# Patient Record
Sex: MALE | Race: OTHER | Employment: FULL TIME | URBAN - METROPOLITAN AREA
[De-identification: names, ages, dates, MRNs, and addresses within clinical notes are randomized per-mention and may not be internally consistent; named-entity substitution may affect disease eponyms.]

---

## 2017-11-14 ENCOUNTER — DOCUMENTATION ONLY (OUTPATIENT)
Dept: FAMILY MEDICINE CLINIC | Age: 37
End: 2017-11-14

## 2017-11-14 ENCOUNTER — OFFICE VISIT (OUTPATIENT)
Dept: FAMILY MEDICINE CLINIC | Age: 37
End: 2017-11-14

## 2017-11-14 VITALS
OXYGEN SATURATION: 95 % | BODY MASS INDEX: 40.18 KG/M2 | TEMPERATURE: 98.9 F | HEART RATE: 107 BPM | DIASTOLIC BLOOD PRESSURE: 88 MMHG | HEIGHT: 66 IN | RESPIRATION RATE: 19 BRPM | SYSTOLIC BLOOD PRESSURE: 155 MMHG | WEIGHT: 250 LBS

## 2017-11-14 DIAGNOSIS — F32.9 REACTIVE DEPRESSION: ICD-10-CM

## 2017-11-14 DIAGNOSIS — R03.0 ELEVATED BP WITHOUT DIAGNOSIS OF HYPERTENSION: Primary | ICD-10-CM

## 2017-11-14 RX ORDER — MELOXICAM 15 MG/1
TABLET ORAL
Refills: 0 | COMMUNITY
Start: 2017-11-03 | End: 2018-08-13

## 2017-11-14 RX ORDER — BUPROPION HYDROCHLORIDE 150 MG/1
150 TABLET ORAL
Qty: 30 TAB | Refills: 0 | Status: SHIPPED | OUTPATIENT
Start: 2017-11-14 | End: 2017-12-06 | Stop reason: SDUPTHER

## 2017-11-14 NOTE — PROGRESS NOTES
Chief Complaint   Patient presents with    New Patient     Establish Care     Patient seen in the office today to establish care

## 2017-11-14 NOTE — PROGRESS NOTES
This note will not be viewable in [address] Office Visit:  14 November 2017     Visit Duration:  45 Minutes                Type: Individual Therapy, Scheduled:          Reason for referral/Chief complaint:  Marital issues, sent by Wife. Referred by: PCP not present at Practice. PCPs concern: N/A    Screening:  Other: Specify : None    Functional Assessment (checkboxes, providing details as indicated):     Mood: anxious Sleep: Trouble falling asleep and Middle insomnia. Physical Activity: No Recreation : Use to workout regularly, but unable now. Caffeine: Yes, 1-2 coffee & 44oz Soda Non-prescription drugs: Yes, Advil PM ETOH:. Yes, 1-3 beers nightly. Tobacco: No, Free since 2014   Close relationships: spouse/SO and family member patient, friend and son Health/Medical concern: Yes, Hx: AD/HD and Depression  Work: full time job doing [employer]     Third Level  Intervention:   Goal setting (S.M.A.R.T.)    Patient education:  verbal Specific handout: Return    Diagnostic Impressions:   Summary statement from Functional Assessment: Cecy Duarte reported Hx of Wellbutrin Rx for Depression 12 years ago with improvement to his Depressive Sx and Family Hx of Depression and Mother's suicide attempts, Yogi Wooten is committed long-term, and grandmother was declared legally insane and incompetent by the courts. Cecy Duarte reported current lack of motivation and initiation to \"get out of bed. \" Cecy Duarte reported discontent with place of work and lack of communication within the departments creating additional stressors. Cecy Duarte reported financial stressors creating difficulty to see benefit in current workplace and feeling as if \"stuck\" with no room for growth. Cecy Duarte shared he truly desires to work in [employer] of [employer] in repairs or building [employer] instruments. Cecy Duarte reported tendonitis in his wrist preventing him from playing music, his use to be coping skill.  Cecy Duarte reported Mother and Father have issues with alcohol as well as Hx Diabetes (Mother suffers from GOUT related to Diabetes).      Lethality  None Reported    Risk level Impressions:   None Reported  Follow-up:   Follow-up 2 Weeks      Adriana Gaines

## 2017-11-14 NOTE — MR AVS SNAPSHOT
Visit Information Date & Time Provider Department Dept. Phone Encounter #  
 11/14/2017  2:00 PM Cm Carlos MD [address] [facility] [phone] [id number] Upcoming Health Maintenance Date Due DTaP/Tdap/Td series (1 - Tdap) 10/30/2002 Influenza Age 5 to Adult 8/1/2017 Allergies as of 11/14/2017  Review Complete On: 11/14/2017 By: Yves Aquino MD  
 No Known Allergies Current Immunizations  Never Reviewed No immunizations on file. Not reviewed this visit You Were Diagnosed With   
  
 Codes Comments Elevated BP without diagnosis of hypertension    -  Primary ICD-10-CM: R03.0 ICD-9-CM: 796.2 Reactive depression     ICD-10-CM: F32.9 ICD-9-CM: 300.4 Vitals BP Pulse Temp Resp Height(growth percentile) Weight(growth percentile) 155/88 (BP 1 Location: Left arm, BP Patient Position: Sitting) (!) 107 98.9 °F (37.2 °C) (Oral) 19 5' 6\" (1.676 m) 250 lb (113.4 kg) SpO2 BMI Smoking Status 95% 40.35 kg/m2 Former Smoker Vitals History BMI and BSA Data Body Mass Index Body Surface Area  
 40.35 kg/m 2 2.3 m 2 Preferred Pharmacy Pharmacy Name Phone [facility] [address] [place] [address], [address] [place] [address] [phone] Your Updated Medication List  
  
   
This list is accurate as of: 11/14/17  3:23 PM.  Always use your most recent med list. ADVIL PM PO Take  by mouth. Indications: alterantes if necessary buPROPion  mg tablet Commonly known as:  Akin Jb Take 1 Tab by mouth every morning. meloxicam 15 mg tablet Commonly known as:  MOBIC TK 1 T PO QD Prescriptions Sent to Pharmacy Refills buPROPion XL (WELLBUTRIN XL) 150 mg tablet 0 Sig: Take 1 Tab by mouth every morning.   
 Class: Normal  
 Pharmacy: [address] [facility] [address] AT [address] [place] [address] Raheem Liao 23  #: [phone] Route: Oral  
  
We Performed the Following CBC WITH AUTOMATED DIFF [27994 CPT(R)] HEMOGLOBIN A1C WITH EAG [11032 CPT(R)] LIPID PANEL [03709 CPT(R)] METABOLIC PANEL, COMPREHENSIVE [32129 CPT(R)] TSH 3RD GENERATION [87942 CPT(R)] Introducing [facility]! [facility] introduces [employer] patient portal. Now you can access parts of your medical record, email your doctor's office, and request medication refills online. 1. In your internet browser, go to [email or web address] 2. Click on the First Time User? Click Here link in the Sign In box. You will see the New Member Sign Up page. 3. Enter your [employer] Access Code exactly as it appears below. You will not need to use this code after youve completed the sign-up process. If you do not sign up before the expiration date, you must request a new code. · [employer] Access Code: [email or web address] Expires: 2/12/2018  3:18 PM 
 
4. Enter the last four digits of your Social Security Number (xxxx) and Date of Birth (mm/dd/yyyy) as indicated and click Submit. You will be taken to the next sign-up page. 5. Create a [employer] ID. This will be your [employer] login ID and cannot be changed, so think of one that is secure and easy to remember. 6. Create a [employer] password. You can change your password at any time. 7. Enter your Password Reset Question and Answer. This can be used at a later time if you forget your password. 8. Enter your e-mail address. You will receive e-mail notification when new information is available in [address] 9. Click Sign Up. You can now view and download portions of your medical record. 10. Click the Download Summary menu link to download a portable copy of your medical information. If you have questions, please visit the Frequently Asked Questions section of the [employer] website. Remember, [employer] is NOT to be used for urgent needs.  For medical emergencies, dial 911. Now available from your iPhone and Android! Please provide this summary of care documentation to your next provider. Your primary care clinician is listed as Monse Carlos. If you have any questions after today's visit, please call [phone].

## 2017-11-14 NOTE — PROGRESS NOTES
Chief Complaint   Patient presents with   [facility] Patient     Establish Care     Patient seen in the office today to establish care    Subjective: (As above and below)     Chief Complaint   Patient presents with    New Patient     Establish Care     he is a 40y.o. year old male who presents for evaluation. Reviewed PmHx, RxHx, FmHx, SocHx, AllgHx and updated in chart. Review of Systems - negative except as listed above    Objective:     Vitals:    11/14/17 1451   BP: 155/88   Pulse: (!) 107   Resp: 19   Temp: 98.9 °F (37.2 °C)   TempSrc: Oral   SpO2: 95%   Weight: 250 lb (113.4 kg)   Height: 5' 6\" (1.676 m)     Physical Examination: General appearance - alert, well appearing, and in no distress  Mental status - normal mood, behavior, speech, dress, motor activity, and thought processes  Eyes - pupils equal and reactive, extraocular eye movements intact  Mouth - mucous membranes moist, pharynx normal without lesions  Chest - clear to auscultation, no wheezes, rales or rhonchi, symmetric air entry  Heart - normal rate, regular rhythm, normal S1, S2, no murmurs, rubs, clicks or gallops  Musculoskeletal - no joint tenderness, deformity or swelling  Extremities - peripheral pulses normal, no pedal edema, no clubbing or cyanosis    Assessment/ Plan:   1. Elevated BP without diagnosis of hypertension  -pt will follow up for fasting labs  - CBC WITH AUTOMATED DIFF  - LIPID PANEL  - METABOLIC PANEL, COMPREHENSIVE  - TSH 3RD GENERATION  - HEMOGLOBIN A1C WITH EAG    2. Reactive depression  -resume wellbutrin     Follow-up Disposition: As needed or as scheduled for counseling with Ganesh Leroy    I have discussed the diagnosis with the patient and the intended plan as seen in the above orders. The patient has received an after-visit summary and questions were answered concerning future plans.      Medication Side Effects and Warnings were discussed with patient: yes  Patient Labs were reviewed: yes  Patient Past Records were reviewed:  yes    Donovan Alfaro M.D.

## 2017-12-06 ENCOUNTER — DOCUMENTATION ONLY (OUTPATIENT)
Dept: FAMILY MEDICINE CLINIC | Age: 37
End: 2017-12-06

## 2017-12-06 ENCOUNTER — OFFICE VISIT (OUTPATIENT)
Dept: FAMILY MEDICINE CLINIC | Age: 37
End: 2017-12-06

## 2017-12-06 VITALS
WEIGHT: 246.2 LBS | BODY MASS INDEX: 39.57 KG/M2 | RESPIRATION RATE: 20 BRPM | OXYGEN SATURATION: 95 % | SYSTOLIC BLOOD PRESSURE: 124 MMHG | DIASTOLIC BLOOD PRESSURE: 83 MMHG | HEART RATE: 85 BPM | HEIGHT: 66 IN | TEMPERATURE: 98.8 F

## 2017-12-06 DIAGNOSIS — R03.0 ELEVATED BP WITHOUT DIAGNOSIS OF HYPERTENSION: ICD-10-CM

## 2017-12-06 DIAGNOSIS — F32.A DEPRESSION, UNSPECIFIED DEPRESSION TYPE: Primary | ICD-10-CM

## 2017-12-06 DIAGNOSIS — J40 BRONCHITIS: ICD-10-CM

## 2017-12-06 RX ORDER — METHYLPREDNISOLONE 4 MG/1
TABLET ORAL
COMMUNITY
Start: 2017-11-30 | End: 2017-12-21 | Stop reason: ALTCHOICE

## 2017-12-06 RX ORDER — CEFDINIR 300 MG/1
CAPSULE ORAL
COMMUNITY
Start: 2017-11-30 | End: 2017-12-21 | Stop reason: ALTCHOICE

## 2017-12-06 RX ORDER — BUPROPION HYDROCHLORIDE 150 MG/1
150 TABLET ORAL
Qty: 30 TAB | Refills: 5 | Status: SHIPPED | OUTPATIENT
Start: 2017-12-06 | End: 2018-02-05 | Stop reason: SDUPTHER

## 2017-12-06 RX ORDER — CODEINE PHOSPHATE AND GUAIFENESIN 10; 100 MG/5ML; MG/5ML
5 SOLUTION ORAL
Qty: 240 ML | Refills: 0 | Status: SHIPPED | OUTPATIENT
Start: 2017-12-06 | End: 2017-12-18 | Stop reason: SDUPTHER

## 2017-12-06 NOTE — PROGRESS NOTES
Chief Complaint   Patient presents with    Labs    Cough     unresolved    Cold Symptoms     unresolved    Follow-up     [facility] on 11/23/17       Patient seen in the office today for fasting labs. Patient states \"I had 1 Yoruba weinberg\"  Patient also reports being seen at [facility] on 11/23/17.  Patient states his symptoms are unresolved, he continues to cough with  increased chest congestion, despite completing Z-pack, and 3 days left of Omnicef and Prednisone  Patient reports he is out of cough syrup, he is requesting another rx

## 2017-12-06 NOTE — PROGRESS NOTES
This note will not be viewable in [address] Office Visit:  6 December 2017     Visit Duration:  30 Minutes                Type: Individual Therapy, Scheduled:          Reason for referral/Chief complaint:  Follow-up      Functional Assessment (checkboxes, providing details as indicated):     Mood: anxious Sleep: No reported symptoms Physical Activity: No Change Recreation : No change   Caffeine: No Change Non-prescription drugs: No Change ETOH:. No Change Tobacco: No Change   Close relationships:  at [employer] was involved in [employer]. Health/Medical concern: No Change N/A Work: work stresses Feel harassed from [address]. Third Level  Intervention:   Cognitive intervention    Patient education:  verbal Specific handout: Stop, Breathe, Think    Diagnostic Impressions:   Summary statement from Functional Assessment: Josi Mckinney reported stressors and frustrations exacerbating existing anxious mood presentations. Josi Mckinney processed these stressors and agreed to utilize wyatt at night with meditations to de-stress and promote reduced anxious mood. Josi Mckinney agreed to plan his future and continue on a slower path to discourage snap decisions resulting in negative outcomes. Lethality  None Reported    Risk level Impressions:   None Reported  Follow-up:    On week      Shar Law

## 2017-12-06 NOTE — MR AVS SNAPSHOT
Visit Information Date & Time Provider Department Dept. Phone Encounter #  
 12/6/2017  3:30 PM Jimmy Lorenzana MD [facility] [facility] [phone] [id number] Your Appointments 12/11/2017  4:30 PM  
CONSULT with Lashelllanny Fam [address] [facility] ([address]) Appt Note: f/u per [facility] DB  
 [address] [address]  
[phone]  
  
   
 [address] [address]  
  
    
 12/18/2017  4:30 PM  
CONSULT with Lashlel Fam [address] [place] [facility] ([address]) Appt Note: f/u, per [facility]  
 [address] [address] [phone] Upcoming Health Maintenance Date Due DTaP/Tdap/Td series (1 - Tdap) 10/30/2002 Influenza Age 5 to Adult 8/1/2017 Allergies as of 12/6/2017  Review Complete On: 12/6/2017 By: Jimmy Lorenzana MD  
 No Known Allergies Current Immunizations  Never Reviewed No immunizations on file. Not reviewed this visit You Were Diagnosed With   
  
 Codes Comments Depression, unspecified depression type    -  Primary ICD-10-CM: F32.9 ICD-9-CM: 249 Bronchitis     ICD-10-CM: J40 ICD-9-CM: 398 Elevated BP without diagnosis of hypertension     ICD-10-CM: R03.0 ICD-9-CM: 796.2 Vitals BP Pulse Temp Resp Height(growth percentile) Weight(growth percentile) 124/83 (BP 1 Location: Left arm, BP Patient Position: Sitting) 85 98.8 °F (37.1 °C) (Oral) 20 5' 6\" (1.676 m) 246 lb 3.2 oz (111.7 kg) SpO2 BMI Smoking Status 95% 39.74 kg/m2 Former Smoker Vitals History BMI and BSA Data Body Mass Index Body Surface Area 39.74 kg/m 2 2.28 m 2 Preferred Pharmacy Pharmacy Name Phone [facility] [address] [place] [address], [address] [place] [address] Drive [phone] Your Updated Medication List  
  
   
This list is accurate as of: 12/6/17  3:42 PM.  Always use your most recent med list. ADVIL PM PO Take  by mouth. Indications: alterantes if necessary buPROPion  mg tablet Commonly known as:  Lashaun Plume Take 1 Tab by mouth every morning. cefdinir 300 mg capsule Commonly known as:  OMNICEF  
  
 guaiFENesin-codeine 100-10 mg/5 mL solution Commonly known as:  ROBITUSSIN AC Take 5 mL by mouth three (3) times daily as needed for Cough. Max Daily Amount: 15 mL. meloxicam 15 mg tablet Commonly known as:  MOBIC TK 1 T PO QD  
  
 methylPREDNISolone 4 mg tablet Commonly known as:  Nguyen Beagle Prescriptions Printed Refills  
 guaiFENesin-codeine (ROBITUSSIN AC) 100-10 mg/5 mL solution 0 Sig: Take 5 mL by mouth three (3) times daily as needed for Cough. Max Daily Amount: 15 mL. Class: Print Route: Oral  
  
Prescriptions Sent to Pharmacy Refills buPROPion XL (WELLBUTRIN XL) 150 mg tablet 5 Sig: Take 1 Tab by mouth every morning. Class: Normal  
 Pharmacy: [facility] [address] [place] [address] [address] [place] [address] #: [phone] Route: Oral  
  
We Performed the Following CBC WITH AUTOMATED DIFF [16064 CPT(R)] HEMOGLOBIN A1C WITH EAG [03793 CPT(R)] LIPID PANEL [92770 CPT(R)] METABOLIC PANEL, COMPREHENSIVE [98124 CPT(R)] TSH 3RD GENERATION [25169 CPT(R)] Introducing hospitals & HEALTH SERVICES! Deena Montelongo introduces [employer] patient portal. Now you can access parts of your medical record, email your doctor's office, and request medication refills online. 1. In your internet browser, go to [email or web address] 2. Click on the First Time User? Click Here link in the Sign In box. You will see the New Member Sign Up page. 3. Enter your [employer] Access Code exactly as it appears below. You will not need to use this code after youve completed the sign-up process.  If you do not sign up before the expiration date, you must request a new code. · [employer] Access Code: [email or web address] Expires: 2/12/2018  3:18 PM 
 
4. Enter the last four digits of your Social Security Number (xxxx) and Date of Birth (mm/dd/yyyy) as indicated and click Submit. You will be taken to the next sign-up page. 5. Create a [employer] ID. This will be your [employer] login ID and cannot be changed, so think of one that is secure and easy to remember. 6. Create a [employer] password. You can change your password at any time. 7. Enter your Password Reset Question and Answer. This can be used at a later time if you forget your password. 8. Enter your e-mail address. You will receive e-mail notification when new information is available in [address] 9. Click Sign Up. You can now view and download portions of your medical record. 10. Click the Download Summary menu link to download a portable copy of your medical information. If you have questions, please visit the Frequently Asked Questions section of the [employer] website. Remember, [employer] is NOT to be used for urgent needs. For medical emergencies, dial 911. Now available from your iPhone and Android! Please provide this summary of care documentation to your next provider. Your primary care clinician is listed as Monse Carlos. If you have any questions after today's visit, please call [phone].

## 2017-12-06 NOTE — PROGRESS NOTES
Patient seen in the office today for fasting labs. Patient states \"I had 1 french weinberg\"  Patient also reports unresolved coughing and increased chest congestion, despite completing 1 Zpak and all but 3 days left of Omnicef  Patient reports he is out of cough syrup, he is requesting another rx due to rib pain with coughing. Subjective: (As above and below)     Chief Complaint   Patient presents with    Labs    Cough     unresolved    Cold Symptoms     unresolved    Follow-up     [facility] on 11/23/17     he is a 40y.o. year old male who presents for evaluation. Reviewed PmHx, RxHx, FmHx, SocHx, AllgHx and updated in chart. Review of Systems - negative except as listed above    Objective:     Vitals:    12/06/17 1522   BP: 124/83   Pulse: 85   Resp: 20   Temp: 98.8 °F (37.1 °C)   TempSrc: Oral   SpO2: 95%   Weight: 246 lb 3.2 oz (111.7 kg)   Height: 5' 6\" (1.676 m)     Physical Examination: General appearance - alert, well appearing, and in no distress  Mental status - normal mood, behavior, speech, dress, motor activity, and thought processes  Mouth - mucous membranes moist, pharynx normal without lesions  Chest - clear to auscultation, no wheezes, rales or rhonchi, symmetric air entry  Heart - normal rate, regular rhythm, normal S1, S2, no murmurs, rubs, clicks or gallops  Musculoskeletal - no joint tenderness, deformity or swelling    Assessment/ Plan:   1. Depression, unspecified depression type  -well controlled  - buPROPion XL (WELLBUTRIN XL) 150 mg tablet; Take 1 Tab by mouth every morning. Dispense: 30 Tab; Refill: 5    2. Bronchitis  - guaiFENesin-codeine (ROBITUSSIN AC) 100-10 mg/5 mL solution; Take 5 mL by mouth three (3) times daily as needed for Cough. Max Daily Amount: 15 mL. Dispense: 240 mL; Refill: 0    3.  Elevated BP without diagnosis of hypertension  - CBC WITH AUTOMATED DIFF  - LIPID PANEL  - METABOLIC PANEL, COMPREHENSIVE  - TSH 3RD GENERATION  - HEMOGLOBIN A1C WITH EAG Follow-up Disposition: As needed  I have discussed the diagnosis with the patient and the intended plan as seen in the above orders. The patient has received an after-visit summary and questions were answered concerning future plans.      Medication Side Effects and Warnings were discussed with patient: yes  Patient Labs were reviewed: yes  Patient Past Records were reviewed:  yes    Rachel Paul M.D.

## 2017-12-07 LAB
ALBUMIN SERPL-MCNC: 4.7 G/DL (ref 3.5–5.5)
ALBUMIN/GLOB SERPL: 1.6 {RATIO} (ref 1.2–2.2)
ALP SERPL-CCNC: 58 IU/L (ref 39–117)
ALT SERPL-CCNC: 31 IU/L (ref 0–44)
AST SERPL-CCNC: 17 IU/L (ref 0–40)
BASOPHILS # BLD AUTO: 0 X10E3/UL (ref 0–0.2)
BASOPHILS NFR BLD AUTO: 0 %
BILIRUB SERPL-MCNC: 1 MG/DL (ref 0–1.2)
BUN SERPL-MCNC: 20 MG/DL (ref 6–20)
BUN/CREAT SERPL: 22 (ref 9–20)
CALCIUM SERPL-MCNC: 9.6 MG/DL (ref 8.7–10.2)
CHLORIDE SERPL-SCNC: 100 MMOL/L (ref 96–106)
CHOLEST SERPL-MCNC: 245 MG/DL (ref 100–199)
CO2 SERPL-SCNC: 25 MMOL/L (ref 18–29)
CREAT SERPL-MCNC: 0.9 MG/DL (ref 0.76–1.27)
EOSINOPHIL # BLD AUTO: 0 X10E3/UL (ref 0–0.4)
EOSINOPHIL NFR BLD AUTO: 0 %
ERYTHROCYTE [DISTWIDTH] IN BLOOD BY AUTOMATED COUNT: 13.2 % (ref 12.3–15.4)
EST. AVERAGE GLUCOSE BLD GHB EST-MCNC: 108 MG/DL
GFR SERPLBLD CREATININE-BSD FMLA CKD-EPI: 109 ML/MIN/1.73
GFR SERPLBLD CREATININE-BSD FMLA CKD-EPI: 127 ML/MIN/1.73
GLOBULIN SER CALC-MCNC: 3 G/DL (ref 1.5–4.5)
GLUCOSE SERPL-MCNC: 103 MG/DL (ref 65–99)
HBA1C MFR BLD: 5.4 % (ref 4.8–5.6)
HCT VFR BLD AUTO: 43.1 % (ref 37.5–51)
HDLC SERPL-MCNC: 49 MG/DL
HGB BLD-MCNC: 15.4 G/DL (ref 13–17.7)
IMM GRANULOCYTES # BLD: 0 X10E3/UL (ref 0–0.1)
IMM GRANULOCYTES NFR BLD: 0 %
INTERPRETATION, 910389: NORMAL
LDLC SERPL CALC-MCNC: 167 MG/DL (ref 0–99)
LYMPHOCYTES # BLD AUTO: 1.9 X10E3/UL (ref 0.7–3.1)
LYMPHOCYTES NFR BLD AUTO: 18 %
MCH RBC QN AUTO: 30.6 PG (ref 26.6–33)
MCHC RBC AUTO-ENTMCNC: 35.7 G/DL (ref 31.5–35.7)
MCV RBC AUTO: 86 FL (ref 79–97)
MONOCYTES # BLD AUTO: 0.3 X10E3/UL (ref 0.1–0.9)
MONOCYTES NFR BLD AUTO: 3 %
NEUTROPHILS # BLD AUTO: 8 X10E3/UL (ref 1.4–7)
NEUTROPHILS NFR BLD AUTO: 79 %
PLATELET # BLD AUTO: 326 X10E3/UL (ref 150–379)
POTASSIUM SERPL-SCNC: 4.4 MMOL/L (ref 3.5–5.2)
PROT SERPL-MCNC: 7.7 G/DL (ref 6–8.5)
RBC # BLD AUTO: 5.03 X10E6/UL (ref 4.14–5.8)
SODIUM SERPL-SCNC: 142 MMOL/L (ref 134–144)
TRIGL SERPL-MCNC: 145 MG/DL (ref 0–149)
TSH SERPL DL<=0.005 MIU/L-ACNC: 1.06 UIU/ML (ref 0.45–4.5)
VLDLC SERPL CALC-MCNC: 29 MG/DL (ref 5–40)
WBC # BLD AUTO: 10.2 X10E3/UL (ref 3.4–10.8)

## 2017-12-07 NOTE — PROGRESS NOTES
Cholesterol is elevated, please closely monitor diet and increase exercise, recheck in 3 months. If levels do not improve then we will need to consider cholesterol lowering medication. All other labs are within normal limits.    Please inform

## 2017-12-11 ENCOUNTER — DOCUMENTATION ONLY (OUTPATIENT)
Dept: FAMILY MEDICINE CLINIC | Age: 37
End: 2017-12-11

## 2017-12-11 NOTE — PROGRESS NOTES
This note will not be viewable in [address] Office Visit:  11 December 2017     Visit Duration:  30 Minutes                Type: Individual Therapy, Scheduled:          Reason for referral/Chief complaint:  Follow-up      Functional Assessment (checkboxes, providing details as indicated):     Mood: full range Sleep: No reported symptoms Physical Activity: No Change Recreation : No Change   Caffeine: No Change Non-prescription drugs: No Change ETOH:. No Change Tobacco: No Change   Close relationships: No chagne Health/Medical concern: No Change N/A Work: No Change     Third Level  Intervention:   Cognitive intervention    Patient education:  verbal Specific handout: Process pain and anxious mood. Diagnostic Impressions:   Summary statement from Functional Assessment: Fabiola Modi processed his anxious mood and perseverating thoughts with Cousnelor. Fabiola Modi reported his full participation in \"Stop, Breathe, Think\" to aid in sleep with mild success. Fabiola Modi reported sleep being painful due to \"rib pain. \" Candicemaykel Zamora agreed to attend [facility] to obtain x-ray for improved Tx options with PCP. Lethality  None Reported    Risk level Impressions:   None Reported  Follow-up:   One week as available.       Antonietta Paredes

## 2017-12-12 ENCOUNTER — HOSPITAL ENCOUNTER (OUTPATIENT)
Dept: GENERAL RADIOLOGY | Age: 37
Discharge: HOME | End: 2017-12-12
Attending: FAMILY MEDICINE
Payer: COMMERCIAL

## 2017-12-12 DIAGNOSIS — R07.81 RIB PAIN: ICD-10-CM

## 2017-12-12 DIAGNOSIS — R07.81 RIB PAIN: Primary | ICD-10-CM

## 2017-12-12 PROCEDURE — 71020 XR CHEST PA LAT: CPT

## 2017-12-12 RX ORDER — PREDNISONE 10 MG/1
TABLET ORAL
Qty: 21 TAB | Refills: 0 | Status: SHIPPED | OUTPATIENT
Start: 2017-12-12 | End: 2017-12-21 | Stop reason: ALTCHOICE

## 2017-12-12 NOTE — PROGRESS NOTES
Notified pt of result per Wes Half. Pt would like to know if he needs to stop taking Meloxicam while taking Prednisone.

## 2017-12-12 NOTE — PROGRESS NOTES
Please advise pt that congestion is noted in the left lung base, no rib fractures. Please take prednisone as written and follow up if symptoms do not resolve.

## 2017-12-18 ENCOUNTER — OFFICE VISIT (OUTPATIENT)
Dept: FAMILY MEDICINE CLINIC | Age: 37
End: 2017-12-18

## 2017-12-18 ENCOUNTER — DOCUMENTATION ONLY (OUTPATIENT)
Dept: FAMILY MEDICINE CLINIC | Age: 37
End: 2017-12-18

## 2017-12-18 VITALS
TEMPERATURE: 98.8 F | SYSTOLIC BLOOD PRESSURE: 132 MMHG | HEIGHT: 66 IN | WEIGHT: 251 LBS | OXYGEN SATURATION: 98 % | HEART RATE: 91 BPM | DIASTOLIC BLOOD PRESSURE: 82 MMHG | RESPIRATION RATE: 18 BRPM | BODY MASS INDEX: 40.34 KG/M2

## 2017-12-18 DIAGNOSIS — R09.1 PLEURISY: Primary | ICD-10-CM

## 2017-12-18 RX ORDER — DICLOFENAC SODIUM 10 MG/G
GEL TOPICAL 4 TIMES DAILY
COMMUNITY
End: 2018-12-06 | Stop reason: ALTCHOICE

## 2017-12-18 RX ORDER — CODEINE PHOSPHATE AND GUAIFENESIN 10; 100 MG/5ML; MG/5ML
5 SOLUTION ORAL
Qty: 240 ML | Refills: 0 | Status: SHIPPED | OUTPATIENT
Start: 2017-12-18 | End: 2018-08-13 | Stop reason: ALTCHOICE

## 2017-12-18 NOTE — MR AVS SNAPSHOT
Visit Information Date & Time Provider Department Dept. Phone Encounter #  
 12/18/2017  3:35 PM Tim Mendiola MD [facility] [phone] [id number] Your Appointments 12/18/2017  4:30 PM  
CONSULT with Antonietta Paredes [facility] ([facility]) Appt Note: f/u, per [facility]  
 [address] [address] [phone]  
  
   
 [address] [address] Upcoming Health Maintenance Date Due DTaP/Tdap/Td series (1 - Tdap) 10/30/2002 Influenza Age 5 to Adult 8/1/2017 Allergies as of 12/18/2017  Review Complete On: 12/18/2017 By: Tim Mendiola MD  
 No Known Allergies Current Immunizations  Never Reviewed No immunizations on file. Not reviewed this visit You Were Diagnosed With   
  
 Codes Comments Pleurisy    -  Primary ICD-10-CM: R09.1 ICD-9-CM: 511.0 Vitals BP Pulse Temp Resp Height(growth percentile) Weight(growth percentile) 132/82 (BP 1 Location: Left arm, BP Patient Position: Sitting) 91 98.8 °F (37.1 °C) (Oral) 18 5' 6\" (1.676 m) 251 lb (113.9 kg) SpO2 BMI Smoking Status 98% 40.51 kg/m2 Former Smoker Vitals History BMI and BSA Data Body Mass Index Body Surface Area 40.51 kg/m 2 2.3 m 2 Preferred Pharmacy Pharmacy Name Phone [facility] [address] [place] [address], [address] [place] [address] [phone] Your Updated Medication List  
  
   
This list is accurate as of: 12/18/17  3:54 PM.  Always use your most recent med list. ADVIL PM PO Take  by mouth. Indications: alterantes if necessary buPROPion  mg tablet Commonly known as:  Julita Tone Take 1 Tab by mouth every morning. cefdinir 300 mg capsule Commonly known as:  OMNICEF  
  
 diclofenac 1 % Gel Commonly known as:  VOLTAREN Apply  to affected area four (4) times daily.  
  
 guaiFENesin-codeine 100-10 mg/5 mL solution Commonly known as:  ROBITUSSIN AC Take 5 mL by mouth three (3) times daily as needed for Cough. Max Daily Amount: 15 mL. meloxicam 15 mg tablet Commonly known as:  MOBIC TK 1 T PO QD  
  
 methylPREDNISolone (PF) 125 mg/2 mL Solr Commonly known as:  SOLU-MEDROL 2 mL by IntraVENous route once for 1 dose. methylPREDNISolone 4 mg tablet Commonly known as:  MEDROL DOSEPACK  
  
 predniSONE 10 mg dose pack Commonly known as:  STERAPRED DS See administration instruction per 10mg dose pack Prescriptions Printed Refills  
 guaiFENesin-codeine (ROBITUSSIN AC) 100-10 mg/5 mL solution 0 Sig: Take 5 mL by mouth three (3) times daily as needed for Cough. Max Daily Amount: 15 mL. Class: Print Route: Oral  
  
We Performed the Following METHYLPREDNISOLONE INJECTION [ [facility]] UT THER/PROPH/DIAG INJECTION, SUBCUT/IM [id number] CPT(R)] Introducing [facility] & HEALTH SERVICES! [employer] introduces [employer] patient portal. Now you can access parts of your medical record, email your doctor's office, and request medication refills online. 1. In your internet browser, go to [email or web address] 2. Click on the First Time User? Click Here link in the Sign In box. You will see the New Member Sign Up page. 3. Enter your [employer] Access Code exactly as it appears below. You will not need to use this code after youve completed the sign-up process. If you do not sign up before the expiration date, you must request a new code. · [employer] Access Code: [email or web address] Expires: 2/12/2018  3:18 PM 
 
4. Enter the last four digits of your Social Security Number (xxxx) and Date of Birth (mm/dd/yyyy) as indicated and click Submit. You will be taken to the next sign-up page. 5. Create a [employer] ID.  This will be your [employer] login ID and cannot be changed, so think of one that is secure and easy to remember. 6. Create a [employer] password. You can change your password at any time. 7. Enter your Password Reset Question and Answer. This can be used at a later time if you forget your password. 8. Enter your e-mail address. You will receive e-mail notification when new information is available in [address] 9. Click Sign Up. You can now view and download portions of your medical record. 10. Click the Download Summary menu link to download a portable copy of your medical information. If you have questions, please visit the Frequently Asked Questions section of the [employer] website. Remember, [employer] is NOT to be used for urgent needs. For medical emergencies, dial 911. Now available from your iPhone and Android! Please provide this summary of care documentation to your next provider. Your primary care clinician is listed as Monse Carlos. If you have any questions after today's visit, please call [phone].

## 2017-12-18 NOTE — PROGRESS NOTES
This note will not be viewable in [address] Office Visit:  18 December 2017     Visit Duration:  30 Minutes                Type: Individual Therapy, Scheduled:          Reason for referral/Chief complaint:  Follow-up    Functional Assessment (checkboxes, providing details as indicated):     Mood: full range Sleep: No Change Physical Activity: No Change Recreation : No Change   Caffeine: No Change Non-prescription drugs: No Change ETOH:. No Change Tobacco: No Change   Close relationships: No Change Health/Medical concern: No Change No Change Work: No Change     Third Level  Intervention:   Cognitive intervention    Patient education:  verbal Specific handout: Process Stressors    Diagnostic Impressions:   Summary statement from Functional Assessment: David Hallman reported continued struggles with frustrations, stressors, and anxious moods preventing him from attending social events this past week. David Hallman processed these situations and the build-ups to these stressors. David Hallman appropriately received nutritional education, neurological inflammation, and their correlation with anxiety. David Hallman received education about I-statements appropriately and agreed to utilize with family.     Lethality  None Reported    Risk level Impressions:   None Reported  Follow-up:   8 Janury 2018      Eric Hitchcock

## 2017-12-18 NOTE — PROGRESS NOTES
Pt here c/o pain in right rib cage x 3 weeks. Reports pain is worse when coughing or lifting ar. Also reports having an ongoing non productive cough. Pt reports that pain at rest is better but pain with coughing is worse. Pt reports that pain is sharp and stabbing. Subjective: (As above and below)     Chief Complaint   Patient presents with    Rib Pain    Cold Symptoms     he is a 40y.o. year old male who presents for evaluation. Reviewed PmHx, RxHx, FmHx, SocHx, AllgHx and updated in chart. Review of Systems - negative except as listed above    Objective:     Vitals:    12/18/17 1531   BP: 132/82   Pulse: 91   Resp: 18   Temp: 98.8 °F (37.1 °C)   TempSrc: Oral   SpO2: 98%   Weight: 251 lb (113.9 kg)   Height: 5' 6\" (1.676 m)     Physical Examination: General appearance - alert, well appearing, and in no distress  Mental status - normal mood, behavior, speech, dress, motor activity, and thought processes  Mouth - mucous membranes moist, pharynx normal without lesions  Chest - chest wall tenderness noted right lateral ribs  Heart - normal rate, regular rhythm, normal S1, S2, no murmurs, rubs, clicks or gallops  Musculoskeletal - no joint tenderness, deformity or swelling  Extremities - peripheral pulses normal, no pedal edema, no clubbing or cyanosis    Assessment/ Plan:   1. Pleurisy  -solumedrol as written, cough medication  - METHYLPREDNISOLONE INJECTION (Qty 2)  - THER/PROPH/DIAG INJECTION, SUBCUT/IM     Follow-up Disposition: As needed  I have discussed the diagnosis with the patient and the intended plan as seen in the above orders. The patient has received an after-visit summary and questions were answered concerning future plans.      Medication Side Effects and Warnings were discussed with patient: yes  Patient Labs were reviewed: yes  Patient Past Records were reviewed:  yes    Bandar Jacobson M.D.

## 2017-12-18 NOTE — PROGRESS NOTES
Pt here c/o pain in right rib cage x 3 weeks. Reports pain is worse when coughing or lifting ar. Also reports having an ongoing non productive cough.

## 2017-12-21 ENCOUNTER — OFFICE VISIT (OUTPATIENT)
Dept: FAMILY MEDICINE CLINIC | Age: 37
End: 2017-12-21

## 2017-12-21 VITALS
OXYGEN SATURATION: 95 % | RESPIRATION RATE: 20 BRPM | TEMPERATURE: 98.3 F | HEART RATE: 95 BPM | HEIGHT: 66 IN | WEIGHT: 251 LBS | BODY MASS INDEX: 40.34 KG/M2 | SYSTOLIC BLOOD PRESSURE: 120 MMHG | DIASTOLIC BLOOD PRESSURE: 85 MMHG

## 2017-12-21 DIAGNOSIS — M94.0 COSTOCHONDRITIS, ACUTE: Primary | ICD-10-CM

## 2017-12-21 RX ORDER — PREDNISONE 10 MG/1
TABLET ORAL
Qty: 42 TAB | Refills: 0 | Status: SHIPPED | OUTPATIENT
Start: 2017-12-21 | End: 2018-08-13 | Stop reason: ALTCHOICE

## 2017-12-21 RX ORDER — CYCLOBENZAPRINE HCL 10 MG
10 TABLET ORAL
Qty: 60 TAB | Refills: 2 | Status: SHIPPED | OUTPATIENT
Start: 2017-12-21 | End: 2018-08-13 | Stop reason: SDUPTHER

## 2017-12-21 NOTE — PROGRESS NOTES
Chief Complaint   Patient presents with    Rib Pain     right     Patient seen in the office today with c/o of right side rib pain  Reports \"the shot on Monday made me feel 50% better\"  Denies SOB. Endorses difficulty to deep breath. No acute distress at this time. Patient able to maintain conversation without difficulty. Noted frequent cough as patient guards right side. \"it feels cuca swollen, very tender to the touch\"  Patient reports completing all prescribed abt's as ordered    Subjective: (As above and below)     Chief Complaint   Patient presents with    Rib Pain     right     he is a 40y.o. year old male who presents for evaluation. Reviewed PmHx, RxHx, FmHx, SocHx, AllgHx and updated in chart. Review of Systems - negative except as listed above    Objective:     Vitals:    12/21/17 1540   BP: 120/85   Pulse: 95   Resp: 20   Temp: 98.3 °F (36.8 °C)   TempSrc: Oral   SpO2: 95%   Weight: 251 lb (113.9 kg)   Height: 5' 6\" (1.676 m)     Physical Examination: General appearance - alert, well appearing, and in no distress  Mental status - normal mood, behavior, speech, dress, motor activity, and thought processes  Mouth - mucous membranes moist, pharynx normal without lesions  Chest - clear to auscultation, no wheezes, rales or rhonchi, symmetric air entry  Reproducible pain and extreme tenderness along right anterior inferior ribs to palpation  Heart - normal rate, regular rhythm, normal S1, S2, no murmurs, rubs, clicks or gallops  Musculoskeletal - no joint tenderness, deformity or swelling    Assessment/ Plan:   1. Costochondritis, acute  Solumedrol today, prednisone, rest, heating pad  - METHYLPREDNISOLONE INJECTION (Qty 2)  - THER/PROPH/DIAG INJECTION, SUBCUT/IM     Follow-up Disposition: As needed  I have discussed the diagnosis with the patient and the intended plan as seen in the above orders. The patient has received an after-visit summary and questions were answered concerning future plans. Medication Side Effects and Warnings were discussed with patient: yes  Patient Labs were reviewed: yes  Patient Past Records were reviewed:  yes    Jh Minaya M.D.

## 2017-12-21 NOTE — MR AVS SNAPSHOT
Visit Information Date & Time Provider Department Dept. Phone Encounter #  
 12/21/2017  3:30 PM Urmila Dwyer MD [facility] [phone] [id number] Your Appointments 1/8/2018  5:00 PM  
CONSULT with Riccardo Ayala [facility] ([facility]) Appt Note: fuv  
 [address] [address] [phone]  
  
   
 [address] [address] Upcoming Health Maintenance Date Due DTaP/Tdap/Td series (1 - Tdap) 10/30/2002 Influenza Age 5 to Adult 8/1/2017 Allergies as of 12/21/2017  Review Complete On: 12/21/2017 By: Urmila Dwyer MD  
 No Known Allergies Current Immunizations  Never Reviewed No immunizations on file. Not reviewed this visit You Were Diagnosed With   
  
 Codes Comments Costochondritis, acute    -  Primary ICD-10-CM: M94.0 ICD-9-CM: 733.6 Vitals BP Pulse Temp Resp Height(growth percentile) Weight(growth percentile) 120/85 (BP 1 Location: Left arm, BP Patient Position: Sitting) 95 98.3 °F (36.8 °C) (Oral) 20 5' 6\" (1.676 m) 251 lb (113.9 kg) SpO2 BMI Smoking Status 95% 40.51 kg/m2 Former Smoker Vitals History BMI and BSA Data Body Mass Index Body Surface Area 40.51 kg/m 2 2.3 m 2 Preferred Pharmacy Pharmacy Name Phone [facility] [address] [place] [address], [address] [place] [address] [phone] Your Updated Medication List  
  
   
This list is accurate as of: 12/21/17  4:05 PM.  Always use your most recent med list. ADVIL PM PO Take  by mouth. Indications: alterantes if necessary buPROPion  mg tablet Commonly known as:  [facility] [place] Take 1 Tab by mouth every morning. cyclobenzaprine 10 mg tablet Commonly known as:  FLEXERIL Take 1 Tab by mouth three (3) times daily as needed for Muscle Spasm(s).   
  
 diclofenac 1 % Gel Commonly known as:  VOLTAREN Apply  to affected area four (4) times daily. guaiFENesin-codeine 100-10 mg/5 mL solution Commonly known as:  ROBITUSSIN AC Take 5 mL by mouth three (3) times daily as needed for Cough. Max Daily Amount: 15 mL. meloxicam 15 mg tablet Commonly known as:  MOBIC TK 1 T PO QD  
  
 methylPREDNISolone (PF) 125 mg/2 mL Solr Commonly known as:  SOLU-MEDROL 2 mL by IntraVENous route once for 1 dose. predniSONE 10 mg tablet Commonly known as:  Halina Mahajan Please take 60mg x 2 days, 50mg x 2 days, 40mg x 2 days, 30mg x 2 days, 20mg x 2 days, 10mg x 2 days Prescriptions Sent to Pharmacy Refills  
 predniSONE (DELTASONE) 10 mg tablet 0 Sig: Please take 60mg x 2 days, 50mg x 2 days, 40mg x 2 days, 30mg x 2 days, 20mg x 2 days, 10mg x 2 days Class: Normal  
 Pharmacy: [facility] [address] [place] [address] [address] [place] [address] E Ph #: [phone]  
 cyclobenzaprine (FLEXERIL) 10 mg tablet 2 Sig: Take 1 Tab by mouth three (3) times daily as needed for Muscle Spasm(s). Class: Normal  
 Pharmacy: [facility] [address] [place] [address] [address] [place] [address] E Ph #: [phone] Route: Oral  
  
We Performed the Following METHYLPREDNISOLONE INJECTION [ [facility]] CT THER/PROPH/DIAG INJECTION, SUBCUT/IM [id number] CPT(R)] Introducing [facility] & HEALTH SERVICES! [employer] introduces [employer] patient portal. Now you can access parts of your medical record, email your doctor's office, and request medication refills online. 1. In your internet browser, go to [email or web address] 2. Click on the First Time User? Click Here link in the Sign In box. You will see the New Member Sign Up page. 3. Enter your [employer] Access Code exactly as it appears below. You will not need to use this code after youve completed the sign-up process.  If you do not sign up before the expiration date, you must request a new code. · [employer] Access Code: [email or web address] Expires: 2/12/2018  3:18 PM 
 
4. Enter the last four digits of your Social Security Number (xxxx) and Date of Birth (mm/dd/yyyy) as indicated and click Submit. You will be taken to the next sign-up page. 5. Create a [employer] ID. This will be your [employer] login ID and cannot be changed, so think of one that is secure and easy to remember. 6. Create a [employer] password. You can change your password at any time. 7. Enter your Password Reset Question and Answer. This can be used at a later time if you forget your password. 8. Enter your e-mail address. You will receive e-mail notification when new information is available in [address] 9. Click Sign Up. You can now view and download portions of your medical record. 10. Click the Download Summary menu link to download a portable copy of your medical information. If you have questions, please visit the Frequently Asked Questions section of the [employer] website. Remember, [employer] is NOT to be used for urgent needs. For medical emergencies, dial 911. Now available from your iPhone and Android! Please provide this summary of care documentation to your next provider. Your primary care clinician is listed as Monse Carlos. If you have any questions after today's visit, please call [phone].

## 2018-01-08 ENCOUNTER — DOCUMENTATION ONLY (OUTPATIENT)
Dept: FAMILY MEDICINE CLINIC | Age: 38
End: 2018-01-08

## 2018-01-08 NOTE — PROGRESS NOTES
This note will not be viewable in [address] Office Visit:  8 January 2018     Visit Duration:  1 Hour                Type: Individual Therapy, Scheduled:          Reason for referral/Chief complaint:  Follow-up    Functional Assessment (checkboxes, providing details as indicated):   No Change    Intervention:   Cognitive intervention    Patient education:  verbal Specific handout: Maintain progress. Diagnostic Impressions:   Summary statement from Functional Assessment: Alta Clark reported high levels of stress and Sx of anxious mood. Alta Clark reported and elaborated on several stressors. Alta Clark processed these stressors and was able to conclude they are manageable if he is able to break them into smaller issues. Alta Clark fully participated in breaking issues into smaller more manageable issues. Alta Clark agreed to return within 1-2 weeks. Lethality  None Reported    Risk level Impressions:   None Reported  Follow-up:   1-2weeks.       Addison Davis

## 2018-01-15 ENCOUNTER — DOCUMENTATION ONLY (OUTPATIENT)
Dept: FAMILY MEDICINE CLINIC | Age: 38
End: 2018-01-15

## 2018-01-16 NOTE — PROGRESS NOTES
This note will not be viewable in [address] Office Visit:  15 January 2018     Visit Duration:  1 Hour 15 Minutes                Type: Individual Therapy, Scheduled:          Reason for referral/Chief complaint:  Follow-up    Functional Assessment (checkboxes, providing details as indicated):   No Change  Third Level  Intervention:   Cognitive intervention    Patient education:  verbal Specific handout: Research nutritional contributing factors to anxiety and stress. Diagnostic Impressions:   Summary statement from Functional Assessment: Cecy Duarte reported difficulty this week with feeling \"complete\" happiness from situations and events which typically would have sparked a larger positive response. Cecy Duarte reported difficulties in recognizing his achievements as a parent and additional stressors stemming from his Childrens' disobedience. Cecy Duarte processed his frustrations and stressors with Counselor. Cecy Duarte was receptive to researching nutritional contributions and agreed to reconvene next week. Lethality  None Reported    Risk level Impressions:   None Reported  Follow-up:   1 week.       Matthias Ortega

## 2018-01-22 ENCOUNTER — DOCUMENTATION ONLY (OUTPATIENT)
Dept: FAMILY MEDICINE CLINIC | Age: 38
End: 2018-01-22

## 2018-01-22 NOTE — PROGRESS NOTES
This note will not be viewable in [address] Office Visit:  22 January 2018     Visit Duration:  1 Hour                Type: Individual Therapy, Scheduled:          Reason for referral/Chief complaint:  Follow-up    Functional Assessment (checkboxes, providing details as indicated):     Mood: anxious Sleep: Trouble falling asleep Physical Activity: No Change Recreation : No change   Caffeine: No Change Non-prescription drugs: No Change ETOH:. No Change Tobacco: No Change   Close relationships: No change Health/Medical concern: No Change N/A Work: full time job doing [employer]. Third Level  Intervention:   Cognitive intervention    Patient education:  verbal Specific handout: Maintain progress; Process Stressors; Medication management F/U    Diagnostic Impressions:   Summary statement from Functional Assessment: Rox Huang reported continued stressors related to anxious mood rather than depressed mood Sx. Lorenzo processed weekly interactions and related Sx to situations and situational stressors. Rox Huang received information appropriately form Counselor about Sx remaining congruent to Anxious Dx rather than Depression Dx. Rox Huang stated he has Hx Rx of Ativan for Panic D/O. Rox Huang received encouragement to follow-up with PCP for Medication Management Review appropriately and agreed to follow-up as available. Lethality  None Reported    Risk level Impressions:   None Reported  Follow-up:   As available.       Key Arndt

## 2018-02-05 ENCOUNTER — OFFICE VISIT (OUTPATIENT)
Dept: FAMILY MEDICINE CLINIC | Age: 38
End: 2018-02-05

## 2018-02-05 VITALS
HEART RATE: 82 BPM | TEMPERATURE: 98 F | DIASTOLIC BLOOD PRESSURE: 95 MMHG | BODY MASS INDEX: 40.02 KG/M2 | SYSTOLIC BLOOD PRESSURE: 136 MMHG | HEIGHT: 66 IN | RESPIRATION RATE: 18 BRPM | OXYGEN SATURATION: 97 % | WEIGHT: 249 LBS

## 2018-02-05 DIAGNOSIS — F32.A ANXIETY AND DEPRESSION: Primary | ICD-10-CM

## 2018-02-05 DIAGNOSIS — F41.9 ANXIETY AND DEPRESSION: Primary | ICD-10-CM

## 2018-02-05 DIAGNOSIS — F32.A DEPRESSION, UNSPECIFIED DEPRESSION TYPE: ICD-10-CM

## 2018-02-05 RX ORDER — BUSPIRONE HYDROCHLORIDE 10 MG/1
10 TABLET ORAL 2 TIMES DAILY
Qty: 60 TAB | Refills: 2 | Status: SHIPPED | OUTPATIENT
Start: 2018-02-05 | End: 2018-05-08 | Stop reason: SDUPTHER

## 2018-02-05 RX ORDER — BUPROPION HYDROCHLORIDE 300 MG/1
300 TABLET ORAL
Qty: 30 TAB | Refills: 5 | Status: SHIPPED | OUTPATIENT
Start: 2018-02-05 | End: 2018-08-04 | Stop reason: SDUPTHER

## 2018-02-05 NOTE — PROGRESS NOTES
Pt here for medication evaluation on Wellbutrin. Would like t odiscuss adding another medication as recommended by Jero Hawley.

## 2018-02-05 NOTE — PROGRESS NOTES
Pt here for medication evaluation on Wellbutrin. Would like to discuss adding another medication as recommended by Diaz Baum. Pt reports that he has been feeling increasingly anxious. Pt reports not wanting to leave the house, low motivation, feeling very anxious about work and leaving the house. Subjective: (As above and below)     Chief Complaint   Patient presents with    Medication Refill     he is a 40y.o. year old male who presents for evaluation. Reviewed PmHx, RxHx, FmHx, SocHx, AllgHx and updated in chart. Review of Systems - negative except as listed above    Objective:     Vitals:    02/05/18 1633   BP: (!) 136/95   Pulse: 82   Resp: 18   Temp: 98 °F (36.7 °C)   TempSrc: Oral   SpO2: 97%   Weight: 249 lb (112.9 kg)   Height: 5' 6\" (1.676 m)     Physical Examination: General appearance - alert, well appearing, and in no distress  Mental status - depressed mood, anxious  Mouth - mucous membranes moist, pharynx normal without lesions  Chest - clear to auscultation, no wheezes, rales or rhonchi, symmetric air entry  Heart - normal rate, regular rhythm, normal S1, S2, no murmurs, rubs, clicks or gallops  Musculoskeletal - no joint tenderness, deformity or swelling  Extremities - peripheral pulses normal, no pedal edema, no clubbing or cyanosis    Assessment/ Plan:   1. Anxiety and depression  -add Buspar to Wellbutrin  -continue with counseling  -reviewed side effects and expected outcome of additional medication     Follow-up Disposition: As needed  I have discussed the diagnosis with the patient and the intended plan as seen in the above orders. The patient has received an after-visit summary and questions were answered concerning future plans.      Medication Side Effects and Warnings were discussed with patient: yes  Patient Labs were reviewed: yes  Patient Past Records were reviewed:  yes    Elvia Fink M.D.

## 2018-02-05 NOTE — MR AVS SNAPSHOT
[address] [address] 
[phone] Patient: Tamiko Drew MRN: [id number] Visit Information Date & Time Provider Department Dept. Phone Encounter #  
 2/5/2018  4:45 PM Anabel Sánchez MD [facility] [phone] [id number] Upcoming Health Maintenance Date Due DTaP/Tdap/Td series (1 - Tdap) 10/30/2002 Influenza Age 5 to Adult 8/1/2017 Allergies as of 2/5/2018  Review Complete On: 2/5/2018 By: Anabel Sánchez MD  
 No Known Allergies Current Immunizations  Never Reviewed No immunizations on file. Not reviewed this visit You Were Diagnosed With   
  
 Codes Comments Anxiety and depression    -  Primary ICD-10-CM: F41.8 ICD-9-CM: 300.00, 311 Depression, unspecified depression type     ICD-10-CM: F32.9 ICD-9-CM: 529 Vitals BP Pulse Temp Resp Height(growth percentile) Weight(growth percentile) (!) 136/95 (BP 1 Location: Right arm, BP Patient Position: Sitting) 82 98 °F (36.7 °C) (Oral) 18 5' 6\" (1.676 m) 249 lb (112.9 kg) SpO2 BMI Smoking Status 97% 40.19 kg/m2 Former Smoker Vitals History BMI and BSA Data Body Mass Index Body Surface Area  
 40.19 kg/m 2 2.29 m 2 Preferred Pharmacy Pharmacy Name Phone [facility] [address] [place] [address], [address] [place] [address] [phone] Your Updated Medication List  
  
   
This list is accurate as of: 2/5/18  5:14 PM.  Always use your most recent med list. ADVIL PM PO Take  by mouth. Indications: alterantes if necessary buPROPion  mg XL tablet Commonly known as:  Kamala Gills Take 1 Tab by mouth every morning. busPIRone 10 mg tablet Commonly known as:  BUSPAR Take 1 Tab by mouth two (2) times a day. cyclobenzaprine 10 mg tablet Commonly known as:  FLEXERIL Take 1 Tab by mouth three (3) times daily as needed for Muscle Spasm(s). diclofenac 1 % Gel Commonly known as:  VOLTAREN Apply  to affected area four (4) times daily. guaiFENesin-codeine 100-10 mg/5 mL solution Commonly known as:  ROBITUSSIN AC Take 5 mL by mouth three (3) times daily as needed for Cough. Max Daily Amount: 15 mL. meloxicam 15 mg tablet Commonly known as:  MOBIC TK 1 T PO QD  
  
 predniSONE 10 mg tablet Commonly known as:  Richy Lard Please take 60mg x 2 days, 50mg x 2 days, 40mg x 2 days, 30mg x 2 days, 20mg x 2 days, 10mg x 2 days Prescriptions Sent to Pharmacy Refills  
 busPIRone (BUSPAR) 10 mg tablet 2 Sig: Take 1 Tab by mouth two (2) times a day. Class: Normal  
 Pharmacy: [facility] [address] [place], [place] [address]y [address] [place] [address] E Ph #: [phone] Route: Oral  
 buPROPion XL (WELLBUTRIN XL) 300 mg XL tablet 5 Sig: Take 1 Tab by mouth every morning. Class: Normal  
 Pharmacy: [facility] [address] [place], [place] [address]y [address] [place] [address] E Ph #: [phone] Route: Oral  
  
Introducing [address]! Arslan Rivas introduces [employer] patient portal. Now you can access parts of your medical record, email your doctor's office, and request medication refills online. 1. In your internet browser, go to [email or web address] 2. Click on the First Time User? Click Here link in the Sign In box. You will see the New Member Sign Up page. 3. Enter your [employer] Access Code exactly as it appears below. You will not need to use this code after youve completed the sign-up process. If you do not sign up before the expiration date, you must request a new code. · [employer] Access Code: [email or web address] Expires: 2/12/2018  3:18 PM 
 
4.  Enter the last four digits of your Social Security Number (xxxx) and Date of Birth (mm/dd/yyyy) as indicated and click Submit. You will be taken to the next sign-up page. 5. Create a [employer] Web ID. This will be your [employer] login ID and cannot be changed, so think of one that is secure and easy to remember. 6. Create a [employer] password. You can change your password at any time. 7. Enter your Password Reset Question and Answer. This can be used at a later time if you forget your password. 8. Enter your e-mail address. You will receive e-mail notification when new information is available in [address] 9. Click Sign Up. You can now view and download portions of your medical record. 10. Click the Download Summary menu link to download a portable copy of your medical information. If you have questions, please visit the Frequently Asked Questions section of the [employer] website. Remember, [employer] is NOT to be used for urgent needs. For medical emergencies, dial 911. Now available from your iPhone and Android! Please provide this summary of care documentation to your next provider. Your primary care clinician is listed as Monse Carlos. If you have any questions after today's visit, please call [phone].

## 2018-02-19 ENCOUNTER — DOCUMENTATION ONLY (OUTPATIENT)
Dept: FAMILY MEDICINE CLINIC | Age: 38
End: 2018-02-19

## 2018-02-19 NOTE — PROGRESS NOTES
This note will not be viewable in [address] Office Visit:  19 February 2018     Visit Duration:  1 Hour                Type: Individual Therapy, Scheduled:          Reason for referral/Chief complaint:  Follow-up    Functional Assessment (checkboxes, providing details as indicated):     Mood: anxious and Reported increased ability to observe Sx objectively and keep under control. Sleep: Trouble falling asleep Physical Activity: Yes Recreation : PT   Caffeine: No Change Non-prescription drugs: No Change ETOH:. No Change Tobacco: No Change   Close relationships: No Change Health/Medical concern: No Change N/A Work: work stresses No Change     Third Level  Intervention:   Cognitive intervention    Patient education:  verbal Specific handout: Maintain in improved Sx and return for follow up    Diagnostic Impressions:   Summary statement from Functional Assessment: Lakesha Puga reported improved Sx and ability to recognize Sx sooner resulting in better control. Lakesha Puga elaborated on recent stressors and this new ability. Lakesha Puga responded appropriately to education and processing of his apprehension to changes taking place around him. Lakesha Puga agreed to return in two weeks to further evaluate Sx control. Lethality  None Reported    Risk level Impressions:   None Reported  Follow-up:   2 weeks.       Adriana Gaines

## 2018-03-05 ENCOUNTER — DOCUMENTATION ONLY (OUTPATIENT)
Dept: FAMILY MEDICINE CLINIC | Age: 38
End: 2018-03-05

## 2018-03-05 NOTE — PROGRESS NOTES
This note will not be viewable in [address] Office Visit:  5 March 2018     Visit Duration:  1 Hour 30 Minutes                Type: Individual Therapy, Scheduled:          Reason for referral/Chief complaint:  Follow-up    Functional Assessment (checkboxes, providing details as indicated):     Mood: Sig Improved mood; full range. Sleep: Trouble falling asleep and Reported due to surgery and related pain. Physical Activity: No Change Recreation : Added PT   Caffeine: No Change Non-prescription drugs: No Change ETOH:. No Change Tobacco: No Change   Close relationships: No Change Health/Medical concern: No Change N/A Work: No Change     Third Level  Intervention:   Cognitive intervention    Patient education:  verbal Specific handout: Processed improved mood and stressors. Diagnostic Impressions:   Summary statement from Functional Assessment: Amanda Mosley reported having begun herbal supplements with great success. Amanda Mosley agreed he has noticed an improvement in mood as well as receiving positive reports from Wife and other supports. Amanda Mosley processed stressors and newly noticed contributors. Amanda Mosley reported feeling tired after interacting with individuals. Amanda Mosley received psycho-education of preparing his day without stress and asking for a \"buffer\" period as he gets home from work so to Listen Up" from interacting in social situations as an introvert. Amanda Mosley received information of D/C plans due to this Counselor departing the practice. Amanda Mosley stated his desire to examine his options before concluding a path for referral; considering his new improved responses. Lethality  None Reported    Risk level Impressions:   None Reported  Follow-up:   2 weeks.       Donn Young

## 2018-03-19 ENCOUNTER — DOCUMENTATION ONLY (OUTPATIENT)
Dept: FAMILY MEDICINE CLINIC | Age: 38
End: 2018-03-19

## 2018-03-19 NOTE — PROGRESS NOTES
This note will not be viewable in [address] Office Visit:  19 March 2018     Visit Duration:  1 Hour                Type: Individual Therapy, Scheduled:          Reason for referral/Chief complaint:  Follow-up    Functional Assessment (checkboxes, providing details as indicated):   Continued improvement. Third Level  Intervention:   Cognitive intervention    Patient education:  verbal Specific handout: Processed stressors; explored options for improvement in stress; processed progress. Diagnostic Impressions:   Summary statement from Functional Assessment: Lakesha Puga reported continued improved mood with Sx reduced; however, stated they are still prevalent. Lakesha Puga reported difficulty sleeping due to anxious mood presentation at night before bed. Lakesha Puga openly processed Sx and Hx of Sx with Counselor. Lakesha Puga and counselor concluded there is a lack of adrenaline seeking activity compared to previous years. Lakesha Puga agreed to seek more adrenaline and testosterone depleting activities as a more typically occurring activity. Lakesha Puga discussed how morning and evening prep time has assisted in improving his mood during isolated events. Lakesha Puga agreed to seek additional time between transitioning events. Lethality  None Reported    Risk level Impressions:   None Reported  Follow-up:   1 week.       Adriana Gaines

## 2018-03-26 ENCOUNTER — DOCUMENTATION ONLY (OUTPATIENT)
Dept: FAMILY MEDICINE CLINIC | Age: 38
End: 2018-03-26

## 2018-03-26 NOTE — PROGRESS NOTES
This note will not be viewable in [address] Office Visit:  26 March 2018     Visit Duration:  1 Hour                Type: Individual Therapy, Scheduled:          Reason for referral/Chief complaint:  Follow-up    Functional Assessment (checkboxes, providing details as indicated):     Mood: anxious Sleep: Trouble falling asleep Physical Activity: No Change Recreation : PT   Caffeine: No Change Non-prescription drugs: No Change ETOH:. No Change Tobacco: No Change   Close relationships: Reported Mother moved without notice; \"distressing. \" Health/Medical concern: No Change N/A Work: full time job doing Sales     Third Level  Intervention:   Care facilitation: (e.g., arranged medical appointments, facilitated linkage with other programs or services) [place] Provider. Patient education:  verbal Specific handout: Processed progress and stressors. Diagnostic Impressions:   Summary statement from Functional Assessment: [facility] openly reported progresses and \"set-backs. \" [facility] agreed to continuing with his regiment of Tx as established by all Professionals in current Tx. [facility] agreed to continue with Tx and Services in place. [facility] received referral to [facility]. [facility] accepted D/C notice appropriately.     Lethality  None Reported    Risk level Impressions:   None Reported  Follow-up:   No follow-up needed      Addison Davis

## 2018-05-08 RX ORDER — BUSPIRONE HYDROCHLORIDE 10 MG/1
TABLET ORAL
Qty: 60 TAB | Refills: 0 | Status: SHIPPED | OUTPATIENT
Start: 2018-05-08 | End: 2018-06-07 | Stop reason: SDUPTHER

## 2018-05-24 ENCOUNTER — TELEPHONE (OUTPATIENT)
Dept: FAMILY MEDICINE CLINIC | Age: 38
End: 2018-05-24

## 2018-05-24 NOTE — TELEPHONE ENCOUNTER
Received call from Danielle Lopez, he recommended pt schedule follow up. He has noted increased anxiety    Please call pt to schedule appointment.

## 2018-06-04 ENCOUNTER — OFFICE VISIT (OUTPATIENT)
Dept: FAMILY MEDICINE CLINIC | Age: 38
End: 2018-06-04

## 2018-06-04 VITALS
DIASTOLIC BLOOD PRESSURE: 90 MMHG | WEIGHT: 248.4 LBS | RESPIRATION RATE: 16 BRPM | TEMPERATURE: 98.5 F | BODY MASS INDEX: 39.92 KG/M2 | HEART RATE: 100 BPM | OXYGEN SATURATION: 99 % | HEIGHT: 66 IN | SYSTOLIC BLOOD PRESSURE: 133 MMHG

## 2018-06-04 DIAGNOSIS — F33.9 RECURRENT DEPRESSION (HCC): ICD-10-CM

## 2018-06-04 DIAGNOSIS — Z23 ENCOUNTER FOR IMMUNIZATION: ICD-10-CM

## 2018-06-04 DIAGNOSIS — E66.01 OBESITY, MORBID (HCC): Primary | ICD-10-CM

## 2018-06-04 DIAGNOSIS — F41.9 ANXIETY: ICD-10-CM

## 2018-06-04 RX ORDER — PAROXETINE HYDROCHLORIDE 20 MG/1
20 TABLET, FILM COATED ORAL DAILY
Qty: 30 TAB | Refills: 2 | Status: SHIPPED | OUTPATIENT
Start: 2018-06-04 | End: 2018-09-07 | Stop reason: SDUPTHER

## 2018-06-04 NOTE — PATIENT INSTRUCTIONS
Body Mass Index: Care Instructions  Your Care Instructions    Body mass index (BMI) can help you see if your weight is raising your risk for health problems. It uses a formula to compare how much you weigh with how tall you are. · A BMI lower than 18.5 is considered underweight. · A BMI between 18.5 and 24.9 is considered healthy. · A BMI between 25 and 29.9 is considered overweight. A BMI of 30 or higher is considered obese. If your BMI is in the normal range, it means that you have a lower risk for weight-related health problems. If your BMI is in the overweight or obese range, you may be at increased risk for weight-related health problems, such as high blood pressure, heart disease, stroke, arthritis or joint pain, and diabetes. If your BMI is in the underweight range, you may be at increased risk for health problems such as fatigue, lower protection (immunity) against illness, muscle loss, bone loss, hair loss, and hormone problems. BMI is just one measure of your risk for weight-related health problems. You may be at higher risk for health problems if you are not active, you eat an unhealthy diet, or you drink too much alcohol or use tobacco products. Follow-up care is a key part of your treatment and safety. Be sure to make and go to all appointments, and call your doctor if you are having problems. It's also a good idea to know your test results and keep a list of the medicines you take. How can you care for yourself at home? · Practice healthy eating habits. This includes eating plenty of fruits, vegetables, whole grains, lean protein, and low-fat dairy. · If your doctor recommends it, get more exercise. Walking is a good choice. Bit by bit, increase the amount you walk every day. Try for at least 30 minutes on most days of the week. · Do not smoke. Smoking can increase your risk for health problems. If you need help quitting, talk to your doctor about stop-smoking programs and medicines. These can increase your chances of quitting for good. · Limit alcohol to 2 drinks a day for men and 1 drink a day for women. Too much alcohol can cause health problems. If you have a BMI higher than 25  · Your doctor may do other tests to check your risk for weight-related health problems. This may include measuring the distance around your waist. A waist measurement of more than 40 inches in men or 35 inches in women can increase the risk of weight-related health problems. · Talk with your doctor about steps you can take to stay healthy or improve your health. You may need to make lifestyle changes to lose weight and stay healthy, such as changing your diet and getting regular exercise. If you have a BMI lower than 18.5  · Your doctor may do other tests to check your risk for health problems. · Talk with your doctor about steps you can take to stay healthy or improve your health. You may need to make lifestyle changes to gain or maintain weight and stay healthy, such as getting more healthy foods in your diet and doing exercises to build muscle. Where can you learn more? Go to http://derik-pb.info/. Enter S176 in the search box to learn more about \"Body Mass Index: Care Instructions. \"  Current as of: October 13, 2016  Content Version: 11.4  © 0247-1599 [employer]. Care instructions adapted under license by [employer] (which disclaims liability or warranty for this information). If you have questions about a medical condition or this instruction, always ask your healthcare professional. [address] any warranty or liability for your use of this information.

## 2018-06-04 NOTE — PROGRESS NOTES
1. Have you been to the ER, urgent care clinic since your last visit? Hospitalized since your last visit? No    2. Have you seen or consulted any other health care providers outside of the [address] [place] [address] since your last visit? Include any pap smears or colon screening. No     Chief Complaint   Patient presents with    Panic Attack     Follow up: Pt states frequency has decrease  2-3x a day.

## 2018-06-04 NOTE — PROGRESS NOTES
Written order received to administer 0.5 ml of Tetanus Toxoid, Reduced Diphtheria Toxoid, and Acellular Pertusis Vaccine Adsorbed BOOSTRIX. After obtaining written consent from the patient, Tdap vaccine was administered to left deltoid by Anastasia Quick LPN. [address]: [id number] , Lot:[id number], Exp: 10/2/20  Manf: [employer]. Patient tolerated procedure well.

## 2018-06-04 NOTE — PROGRESS NOTES
Chief Complaint   Patient presents with    Panic Attack     Follow up: Pt states frequency has decrease  2-3x a day. Pt states would like imm. inj due excepting  2018. Pt reports that anxiety approved when he was taking CBD oil, stopped due to cost.     Pt reports that anxiety attacks have increased from once every few days to 4-5 per day. Pt is taking 5HTP, aswaganda and vanessa. Subjective: (As above and below)     Chief Complaint   Patient presents with    Panic Attack     Follow up: Pt states frequency has decrease  2-3x a day. Pt states would like imm. inj due excepting  10 July 2018.      he is a 40y.o. year old male who presents for evaluation. Reviewed PmHx, RxHx, FmHx, SocHx, AllgHx and updated in chart. Review of Systems - negative except as listed above    Objective:     Vitals:    18 1646   BP: 133/90   Pulse: 100   Resp: 16   Temp: 98.5 °F (36.9 °C)   TempSrc: Oral   SpO2: 99%   Weight: 248 lb 6.4 oz (112.7 kg)   Height: 5' 6\" (1.676 m)     Physical Examination: General appearance - alert, well appearing, and in no distress  Mental status - normal mood, behavior, speech, dress, motor activity, and thought processes  Nose - normal and patent, no erythema, discharge or polyps  Neck - supple, no significant adenopathy  Chest - clear to auscultation, no wheezes, rales or rhonchi, symmetric air entry  Heart - normal rate, regular rhythm, normal S1, S2, no murmurs, rubs, clicks or gallops  Musculoskeletal - wrap on left wrist due to recent surgery. Assessment/ Plan:   1. Obesity, morbid ([facility] [address]  -work on diet and exercise    2. Recurrent depression ([facility] [address]  -paxil, reviewed side effects  -continue wellbutrin and buspar  Patient education: Side effects are common when starting SSRI therapy. Common side effects include headache, nausea, and diarrhea but these symptoms usually resolve after 2-3 weeks of therapy.  Taking your SSRI with medication can help improve these side effects. There is also potential to experience a withdrawal syndrome with rapidly discontinuing SSRIs after 5 weeks of use. SSRI-withdrawal syndrome due to decreased amount of serotonin can result in dizziness, anxiety, nausea, sleep disturbance, and insomnia. 3. Anxiety  -continue buspar    4. Encounter for immunization  - Tetanus, diphtheria toxoids and acellular pertussis (TDAP) vaccine, in individuals >=7 years, IM     Follow-up Disposition: As needed  I have discussed the diagnosis with the patient and the intended plan as seen in the above orders. The patient has received an after-visit summary and questions were answered concerning future plans. Medication Side Effects and Warnings were discussed with patient: yes  Patient Labs were reviewed: yes  Patient Past Records were reviewed:  yes    Amanda Mina M.D. Discussed the patient's BMI with him. The BMI follow up plan is as follows:     dietary management education, guidance, and counseling  encourage exercise  monitor weight  prescribed dietary intake    An After Visit Summary was printed and given to the patient.

## 2018-06-07 RX ORDER — BUSPIRONE HYDROCHLORIDE 10 MG/1
TABLET ORAL
Qty: 60 TAB | Refills: 0 | Status: SHIPPED | OUTPATIENT
Start: 2018-06-07 | End: 2018-07-06 | Stop reason: SDUPTHER

## 2018-07-06 RX ORDER — BUSPIRONE HYDROCHLORIDE 10 MG/1
TABLET ORAL
Qty: 60 TAB | Refills: 0 | Status: SHIPPED | OUTPATIENT
Start: 2018-07-06 | End: 2018-08-04 | Stop reason: SDUPTHER

## 2018-08-13 ENCOUNTER — OFFICE VISIT (OUTPATIENT)
Dept: FAMILY MEDICINE CLINIC | Age: 38
End: 2018-08-13

## 2018-08-13 VITALS
DIASTOLIC BLOOD PRESSURE: 88 MMHG | HEIGHT: 66 IN | BODY MASS INDEX: 37.93 KG/M2 | TEMPERATURE: 98.2 F | WEIGHT: 236 LBS | HEART RATE: 86 BPM | RESPIRATION RATE: 16 BRPM | OXYGEN SATURATION: 99 % | SYSTOLIC BLOOD PRESSURE: 129 MMHG

## 2018-08-13 DIAGNOSIS — G89.29 CHRONIC MIDLINE LOW BACK PAIN WITH LEFT-SIDED SCIATICA: Primary | ICD-10-CM

## 2018-08-13 DIAGNOSIS — M54.42 CHRONIC MIDLINE LOW BACK PAIN WITH LEFT-SIDED SCIATICA: Primary | ICD-10-CM

## 2018-08-13 RX ORDER — CYCLOBENZAPRINE HCL 10 MG
10 TABLET ORAL
Qty: 60 TAB | Refills: 2 | Status: SHIPPED | OUTPATIENT
Start: 2018-08-13 | End: 2018-11-05 | Stop reason: SDUPTHER

## 2018-08-13 RX ORDER — PREDNISONE 10 MG/1
TABLET ORAL
Qty: 1 PACKAGE | Refills: 0 | Status: SHIPPED | OUTPATIENT
Start: 2018-08-13 | End: 2018-12-06 | Stop reason: ALTCHOICE

## 2018-08-13 NOTE — PROGRESS NOTES
1. Have you been to the ER, urgent care clinic since your last visit? Hospitalized since your last visit? No    2. Have you seen or consulted any other health care providers outside of the [address] [place] [address] since your last visit? Include any pap smears or colon screening.  No     Chief Complaint   Patient presents with    Back Pain     Lower Ongoing, X years

## 2018-08-13 NOTE — PATIENT INSTRUCTIONS
Sciatica: Care Instructions  Your Care Instructions    Sciatica (say \"mxe-ZS-oa-kuh\") is an irritation of one of the sciatic nerves, which come from the spinal cord in the lower back. The sciatic nerves and their branches extend down through the buttock to the foot. Sciatica can develop when an injured disc in the back presses against a spinal nerve root. Its main symptom is pain, numbness, or weakness that is often worse in the leg or foot than in the back. Sciatica often will improve and go away with time. Early treatment usually includes medicines and exercises to relieve pain. Follow-up care is a key part of your treatment and safety. Be sure to make and go to all appointments, and call your doctor if you are having problems. It's also a good idea to know your test results and keep a list of the medicines you take. How can you care for yourself at home? · Take pain medicines exactly as directed. ¨ If the doctor gave you a prescription medicine for pain, take it as prescribed. ¨ If you are not taking a prescription pain medicine, ask your doctor if you can take an over-the-counter medicine. · Use heat or ice to relieve pain. ¨ To apply heat, put a warm water bottle, heating pad set on low, or warm cloth on your back. Do not go to sleep with a heating pad on your skin. ¨ To use ice, put ice or a cold pack on the area for 10 to 20 minutes at a time. Put a thin cloth between the ice and your skin. · Avoid sitting if possible, unless it feels better than standing. · Alternate lying down with short walks. Increase your walking distance as you are able to without making your symptoms worse. · Do not do anything that makes your symptoms worse. When should you call for help? Call 911 anytime you think you may need emergency care.  For example, call if:    · You are unable to move a leg at all.   [facility] your doctor now or seek immediate medical care if:    · You have new or worse symptoms in your legs or buttocks. Symptoms may include:  ¨ Numbness or tingling. ¨ Weakness. ¨ Pain.     · You lose bladder or bowel control.    Watch closely for changes in your health, and be sure to contact your doctor if:    · You are not getting better as expected. Where can you learn more? Go to http://derik-pb.info/. Enter [phone] in the search box to learn more about \"Sciatica: Care Instructions. \"  Current as of: November 29, 2017  Content Version: 11.7  © 7245-9730 [employer]. Care instructions adapted under license by [employer] (which disclaims liability or warranty for this information). If you have questions about a medical condition or this instruction, always ask your healthcare professional. [address] any warranty or liability for your use of this information.

## 2018-08-13 NOTE — PROGRESS NOTES
Chief Complaint   Patient presents with    Back Pain     Lower Ongoing, X years     he is a 40y.o. year old male who presents for evalution. Pt states has long standing hx of back pain. Pt states uses muscle relaxers intermittently which typically helps. Then last Wednesday started having low back pain with radiation down into left leg. Tried to rest, has been taking Advil, Tylenol Arthritis which didn't help. Pt has been taking wife's left over Norco at bedtime to sleep. Course is constant. Reviewed PmHx, RxHx, FmHx, SocHx, AllgHx and updated and dated in the chart. Review of Systems - negative except as listed above in the HPI    Objective:     Vitals:    08/13/18 0725   BP: 129/88   Pulse: 86   Resp: 16   Temp: 98.2 °F (36.8 °C)   TempSrc: Oral   SpO2: 99%   Weight: 236 lb (107 kg)   Height: 5' 6\" (1.676 m)     Physical Examination: General appearance - alert, well appearing, and in no distress  Chest - clear to auscultation, no wheezes, rales or rhonchi, symmetric air entry  Heart - normal rate, regular rhythm, normal S1, S2, no murmurs, rubs, clicks or gallops  Back exam - limited range of motion, pain with motion noted during exam, tenderness noted lower lumbar with muscle spasm and L SI joint    Assessment/ Plan:   Diagnoses and all orders for this visit:    1. Chronic midline low back pain with left-sided sciatica  -     predniSONE (STERAPRED DS) 10 mg dose pack; Use as directed on packaging x 6 days  -     cyclobenzaprine (FLEXERIL) 10 mg tablet; Take 1 Tab by mouth three (3) times daily as needed for Muscle Spasm(s). New rx. Activity modification, gentle stretching. F/U prn     Pt voiced understanding regarding plan of care. Follow-up Disposition:  Return if symptoms worsen or fail to improve. I have discussed the diagnosis with the patient and the intended plan as seen in the above orders.   The patient has received an after-visit summary and questions were answered concerning future plans.      Medication Side Effects and Warnings were discussed with patient    Jann Cruz NP

## 2018-08-13 NOTE — MR AVS SNAPSHOT
[address] [address] 
[phone] Patient: Stas Tierney MRN: [id number] : Visit Information Date & Time Provider Department Dept. Phone Encounter #  
 2018  7:00 AM Ashlyn Quintero NP [address] [facility] [phone] [id number] Follow-up Instructions Return if symptoms worsen or fail to improve. Upcoming Health Maintenance Date Due Influenza Age 5 to Adult 2018 DTaP/Tdap/Td series (2 - Td) 2028 Allergies as of 2018  Review Complete On: 2018 By: Ashlyn Quintero NP No Known Allergies Current Immunizations  Never Reviewed Name Date Tdap 2018  5:34 PM  
  
 Not reviewed this visit You Were Diagnosed With   
  
 Codes Comments Chronic midline low back pain with left-sided sciatica    -  Primary ICD-10-CM: M54.42, G89.29 ICD-9-CM: 724.2, 724.3, 338.29 Vitals BP Pulse Temp Resp Height(growth percentile) Weight(growth percentile) 129/88 86 98.2 °F (36.8 °C) (Oral) 16 5' 6\" (1.676 m) 236 lb (107 kg) SpO2 BMI Smoking Status 99% 38.09 kg/m2 Former Smoker BMI and BSA Data Body Mass Index Body Surface Area 38.09 kg/m 2 2.23 m 2 Preferred Pharmacy Pharmacy Name Phone [id number] [phone] Your Updated Medication List  
  
   
This list is accurate as of 18  7:31 AM.  Always use your most recent med list. ADVIL PM PO Take  by mouth. Indications: alterantes if necessary buPROPion  mg XL tablet Commonly known as:  WELLBUTRIN XL  
TAKE 1 TABLET BY MOUTH EACH MORNING  
  
 busPIRone 10 mg tablet Commonly known as:  BUSPAR  
TAKE 1 TABLET BY MOUTH TWICE A DAY  
  
 cyclobenzaprine 10 mg tablet Commonly known as:  FLEXERIL Take 1 Tab by mouth three (3) times daily as needed for Muscle Spasm(s). diclofenac 1 % Gel Commonly known as:  VOLTAREN Apply  to affected area four (4) times daily. PARoxetine 20 mg tablet Commonly known as:  PAXIL Take 1 Tab by mouth daily. predniSONE 10 mg dose pack Commonly known as:  STERAPRED DS Use as directed on packaging x 6 days Prescriptions Sent to Pharmacy Refills  
 predniSONE (STERAPRED DS) 10 mg dose pack 0 Sig: Use as directed on packaging x 6 days Class: Normal  
 Pharmacy: [facility]/pharmacy #[id number] - [address] Felipe Barlow Ph #: [phone]  
 cyclobenzaprine (FLEXERIL) 10 mg tablet 2 Sig: Take 1 Tab by mouth three (3) times daily as needed for Muscle Spasm(s). Class: Normal  
 Pharmacy: [facility]/pharmacy [address] [place] [address] Ph #: [phone] Route: Oral  
  
Follow-up Instructions Return if symptoms worsen or fail to improve. Patient Instructions Sciatica: Care Instructions Your Care Instructions Sciatica (say \"dnw-JT-wl-kuh\") is an irritation of one of the sciatic nerves, which come from the spinal cord in the lower back. The sciatic nerves and their branches extend down through the buttock to the foot. Sciatica can develop when an injured disc in the back presses against a spinal nerve root. Its main symptom is pain, numbness, or weakness that is often worse in the leg or foot than in the back. Sciatica often will improve and go away with time. Early treatment usually includes medicines and exercises to relieve pain. Follow-up care is a key part of your treatment and safety. Be sure to make and go to all appointments, and call your doctor if you are having problems. It's also a good idea to know your test results and keep a list of the medicines you take. How can you care for yourself at home? · Take pain medicines exactly as directed. ¨ If the doctor gave you a prescription medicine for pain, take it as prescribed.  
¨ If you are not taking a prescription pain medicine, ask your doctor if you can take an over-the-counter medicine. · Use heat or ice to relieve pain. ¨ To apply heat, put a warm water bottle, heating pad set on low, or warm cloth on your back. Do not go to sleep with a heating pad on your skin. ¨ To use ice, put ice or a cold pack on the area for 10 to 20 minutes at a time. Put a thin cloth between the ice and your skin. · Avoid sitting if possible, unless it feels better than standing. · Alternate lying down with short walks. Increase your walking distance as you are able to without making your symptoms worse. · Do not do anything that makes your symptoms worse. When should you call for help? Call 911 anytime you think you may need emergency care. For example, call if: 
  · You are unable to move a leg at all.  
[facility] your doctor now or seek immediate medical care if: 
  · You have new or worse symptoms in your legs or buttocks. Symptoms may include: ¨ Numbness or tingling. ¨ Weakness. ¨ Pain.  
  · You lose bladder or bowel control.  
 Watch closely for changes in your health, and be sure to contact your doctor if: 
  · You are not getting better as expected. Where can you learn more? Go to http://derik-pb.info/. Enter [phone] in the search box to learn more about \"Sciatica: Care Instructions. \" Current as of: November 29, 2017 Content Version: 11.7 © 8989-3747 LIA. Care instructions adapted under license by [employer] (which disclaims liability or warranty for this information). If you have questions about a medical condition or this instruction, always ask your healthcare professional. [address] [address] any warranty or liability for your use of this information. Introducing [facility]! [facility] introduces Xcerion patient portal. Now you can access parts of your medical record, email your doctor's office, and request medication refills online.    
 
1. In your internet browser, go to [email or web address] 2. Click on the First Time User? Click Here link in the Sign In box. You will see the New Member Sign Up page. 3. Enter your Schedule Savvy Access Code exactly as it appears below. You will not need to use this code after youve completed the sign-up process. If you do not sign up before the expiration date, you must request a new code. · Schedule Savvy Access Code: [email or web address] Expires: 9/2/2018  5:18 PM 
 
4. Enter the last four digits of your Social Security Number (xxxx) and Date of Birth (mm/dd/yyyy) as indicated and click Submit. You will be taken to the next sign-up page. 5. Create a Zopat ID. This will be your Schedule Savvy login ID and cannot be changed, so think of one that is secure and easy to remember. 6. Create a Schedule Savvy password. You can change your password at any time. 7. Enter your Password Reset Question and Answer. This can be used at a later time if you forget your password. 8. Enter your e-mail address. You will receive e-mail notification when new information is available in [address] 9. Click Sign Up. You can now view and download portions of your medical record. 10. Click the Download Summary menu link to download a portable copy of your medical information. If you have questions, please visit the Frequently Asked Questions section of the Schedule Savvy website. Remember, Schedule Savvy is NOT to be used for urgent needs. For medical emergencies, dial 911. Now available from your iPhone and Android! Please provide this summary of care documentation to your next provider. Your primary care clinician is listed as Monse Carlos. If you have any questions after today's visit, please call [phone].

## 2018-09-07 RX ORDER — PAROXETINE HYDROCHLORIDE 20 MG/1
TABLET, FILM COATED ORAL
Qty: 30 TAB | Refills: 1 | Status: SHIPPED | OUTPATIENT
Start: 2018-09-07 | End: 2018-11-08 | Stop reason: SDUPTHER

## 2018-11-05 DIAGNOSIS — G89.29 CHRONIC MIDLINE LOW BACK PAIN WITH LEFT-SIDED SCIATICA: ICD-10-CM

## 2018-11-05 DIAGNOSIS — M54.42 CHRONIC MIDLINE LOW BACK PAIN WITH LEFT-SIDED SCIATICA: ICD-10-CM

## 2018-11-05 RX ORDER — CYCLOBENZAPRINE HCL 10 MG
10 TABLET ORAL
Qty: 60 TAB | Refills: 2 | Status: SHIPPED | OUTPATIENT
Start: 2018-11-05 | End: 2019-09-26 | Stop reason: SDUPTHER

## 2018-11-08 RX ORDER — PAROXETINE HYDROCHLORIDE 20 MG/1
TABLET, FILM COATED ORAL
Qty: 30 TAB | Refills: 1 | Status: SHIPPED | OUTPATIENT
Start: 2018-11-08 | End: 2018-12-06 | Stop reason: SDUPTHER

## 2018-12-06 ENCOUNTER — OFFICE VISIT (OUTPATIENT)
Dept: FAMILY MEDICINE CLINIC | Age: 38
End: 2018-12-06

## 2018-12-06 ENCOUNTER — TELEPHONE (OUTPATIENT)
Dept: FAMILY MEDICINE CLINIC | Age: 38
End: 2018-12-06

## 2018-12-06 VITALS
OXYGEN SATURATION: 96 % | WEIGHT: 240 LBS | BODY MASS INDEX: 38.57 KG/M2 | TEMPERATURE: 98.7 F | SYSTOLIC BLOOD PRESSURE: 143 MMHG | HEART RATE: 96 BPM | RESPIRATION RATE: 20 BRPM | HEIGHT: 66 IN | DIASTOLIC BLOOD PRESSURE: 92 MMHG

## 2018-12-06 DIAGNOSIS — Z30.09 VASECTOMY EVALUATION: ICD-10-CM

## 2018-12-06 DIAGNOSIS — F32.A DEPRESSION, UNSPECIFIED DEPRESSION TYPE: ICD-10-CM

## 2018-12-06 DIAGNOSIS — L30.9 ECZEMA, UNSPECIFIED TYPE: ICD-10-CM

## 2018-12-06 DIAGNOSIS — F41.9 ANXIETY: Primary | ICD-10-CM

## 2018-12-06 RX ORDER — BUPROPION HYDROCHLORIDE 450 MG/1
TABLET, FILM COATED, EXTENDED RELEASE ORAL
Qty: 30 TAB | Refills: 5 | Status: SHIPPED | OUTPATIENT
Start: 2018-12-06 | End: 2018-12-06 | Stop reason: RX

## 2018-12-06 RX ORDER — TRIAMCINOLONE ACETONIDE 1 MG/G
CREAM TOPICAL 2 TIMES DAILY
Qty: 60 G | Refills: 5 | Status: SHIPPED | OUTPATIENT
Start: 2018-12-06 | End: 2020-11-24 | Stop reason: SDUPTHER

## 2018-12-06 RX ORDER — PAROXETINE 10 MG/1
TABLET, FILM COATED ORAL
Qty: 30 TAB | Refills: 5 | Status: SHIPPED | OUTPATIENT
Start: 2018-12-06 | End: 2019-08-16 | Stop reason: SDUPTHER

## 2018-12-06 RX ORDER — BUPROPION HYDROCHLORIDE 300 MG/1
300 TABLET ORAL
Qty: 30 TAB | Refills: 11 | Status: SHIPPED | OUTPATIENT
Start: 2018-12-06 | End: 2020-01-14

## 2018-12-06 RX ORDER — BUSPIRONE HYDROCHLORIDE 15 MG/1
15 TABLET ORAL 3 TIMES DAILY
Qty: 90 TAB | Refills: 5 | Status: SHIPPED | OUTPATIENT
Start: 2018-12-06 | End: 2019-09-19 | Stop reason: SDUPTHER

## 2018-12-06 RX ORDER — BUPROPION HYDROCHLORIDE 150 MG/1
150 TABLET ORAL
Qty: 30 TAB | Refills: 11 | Status: SHIPPED | OUTPATIENT
Start: 2018-12-06 | End: 2019-09-03 | Stop reason: SDUPTHER

## 2018-12-06 NOTE — PROGRESS NOTES
Chief Complaint   Patient presents with    Medication Refill     Pt seen in the office today for a medication refill    Pt reports that his anxiety has been high, rubbing his thumbs until they are bruised and have sores. Pt reports that he has also been taking CBD oil with some benefit. Pt reports that he finds it difficult to get excited about anything, low libido. Pt had a child in July, which does make him smile. Pt is interested in a referral to urology. Subjective: (As above and below)     Chief Complaint   Patient presents with    Medication Refill     he is a 41y.o. year old male who presents for evaluation. Reviewed PmHx, RxHx, FmHx, SocHx, AllgHx and updated in chart. Review of Systems - negative except as listed above    Objective:     Vitals:    12/06/18 0745   BP: (!) 143/92   Pulse: 96   Resp: 20   Temp: 98.7 °F (37.1 °C)   TempSrc: Oral   SpO2: 96%   Weight: 240 lb (108.9 kg)   Height: 5' 6\" (1.676 m)     Physical Examination: General appearance - alert, well appearing, and in no distress  Mental status - normal mood, behavior, speech, dress, motor activity, and thought processes  Mouth - mucous membranes moist, pharynx normal without lesions  Chest - clear to auscultation, no wheezes, rales or rhonchi, symmetric air entry  Heart - normal rate, regular rhythm, normal S1, S2, no murmurs, rubs, clicks or gallops  Musculoskeletal - no joint tenderness, deformity or swelling  Extremities - peripheral pulses normal, no pedal edema, no clubbing or cyanosis    Assessment/ Plan:   1. Depression, unspecified depression type  -increase wellbutrin, decrease paxil  - buPROPion  mg Tb24; TAKE 1 TABLET BY MOUTH EACH MORNING  Dispense: 30 Tab; Refill: 5  - PARoxetine (PAXIL) 10 mg tablet; TAKE 1 TABLET BY MOUTH ONCE DAILY  Dispense: 30 Tab; Refill: 5    2. Anxiety  -increase buspar  - busPIRone (BUSPAR) 15 mg tablet; Take 1 Tab by mouth three (3) times daily.   Dispense: 90 Tab; Refill: 5    3. Eczema, unspecified type  -as needed use  - triamcinolone acetonide (KENALOG) 0.1 % topical cream; Apply  to affected area two (2) times a day. use thin layer  Dispense: 60 g; Refill: 5    4. Vasectomy evaluation  - REFERRAL TO UROLOGY     Follow-up Disposition: As needed  I have discussed the diagnosis with the patient and the intended plan as seen in the above orders. The patient has received an after-visit summary and questions were answered concerning future plans.      Medication Side Effects and Warnings were discussed with patient: yes  Patient Labs were reviewed: yes  Patient Past Records were reviewed:  yes    Raysa Watson M.D.

## 2018-12-06 NOTE — TELEPHONE ENCOUNTER
[facility] called stating Bupropion does not come in 450 mg tabs. States she can give RX for 300mg and 150mg to equal the 450mg if approved. Requesting a call to [phone].

## 2019-08-16 DIAGNOSIS — F32.A DEPRESSION, UNSPECIFIED DEPRESSION TYPE: ICD-10-CM

## 2019-08-16 RX ORDER — PAROXETINE 10 MG/1
TABLET, FILM COATED ORAL
Qty: 30 TAB | Refills: 1 | Status: SHIPPED | OUTPATIENT
Start: 2019-08-16 | End: 2019-09-23 | Stop reason: SDUPTHER

## 2019-09-03 RX ORDER — BUPROPION HYDROCHLORIDE 150 MG/1
150 TABLET ORAL
Qty: 30 TAB | Refills: 11 | Status: SHIPPED | OUTPATIENT
Start: 2019-09-03 | End: 2020-09-13 | Stop reason: SDUPTHER

## 2019-09-19 DIAGNOSIS — F41.9 ANXIETY: ICD-10-CM

## 2019-09-19 RX ORDER — BUSPIRONE HYDROCHLORIDE 15 MG/1
15 TABLET ORAL 3 TIMES DAILY
Qty: 270 TAB | Refills: 1 | Status: SHIPPED | OUTPATIENT
Start: 2019-09-19 | End: 2019-09-26

## 2019-09-23 DIAGNOSIS — F32.A DEPRESSION, UNSPECIFIED DEPRESSION TYPE: ICD-10-CM

## 2019-09-23 RX ORDER — PAROXETINE 10 MG/1
TABLET, FILM COATED ORAL
Qty: 90 TAB | Refills: 1 | Status: SHIPPED | OUTPATIENT
Start: 2019-09-23 | End: 2019-11-07

## 2019-09-26 ENCOUNTER — OFFICE VISIT (OUTPATIENT)
Dept: FAMILY MEDICINE CLINIC | Age: 39
End: 2019-09-26

## 2019-09-26 VITALS
DIASTOLIC BLOOD PRESSURE: 90 MMHG | RESPIRATION RATE: 16 BRPM | HEIGHT: 66 IN | WEIGHT: 251 LBS | OXYGEN SATURATION: 95 % | HEART RATE: 89 BPM | TEMPERATURE: 98.6 F | SYSTOLIC BLOOD PRESSURE: 128 MMHG | BODY MASS INDEX: 40.34 KG/M2

## 2019-09-26 DIAGNOSIS — M54.42 CHRONIC MIDLINE LOW BACK PAIN WITH LEFT-SIDED SCIATICA: ICD-10-CM

## 2019-09-26 DIAGNOSIS — M77.11 LATERAL EPICONDYLITIS OF RIGHT ELBOW: Primary | ICD-10-CM

## 2019-09-26 DIAGNOSIS — F41.9 ANXIETY: ICD-10-CM

## 2019-09-26 DIAGNOSIS — G89.29 CHRONIC MIDLINE LOW BACK PAIN WITH LEFT-SIDED SCIATICA: ICD-10-CM

## 2019-09-26 RX ORDER — LIDOCAINE HYDROCHLORIDE 10 MG/ML
2 INJECTION INFILTRATION; PERINEURAL ONCE
Qty: 2 ML | Refills: 0
Start: 2019-09-26 | End: 2019-09-26

## 2019-09-26 RX ORDER — METHYLPREDNISOLONE ACETATE 80 MG/ML
80 INJECTION, SUSPENSION INTRA-ARTICULAR; INTRALESIONAL; INTRAMUSCULAR; SOFT TISSUE ONCE
Qty: 1 ML | Refills: 0
Start: 2019-09-26 | End: 2019-09-26

## 2019-09-26 RX ORDER — CYCLOBENZAPRINE HCL 10 MG
10 TABLET ORAL
Qty: 60 TAB | Refills: 2 | Status: SHIPPED | OUTPATIENT
Start: 2019-09-26 | End: 2020-02-26

## 2019-09-26 RX ORDER — SERTRALINE HYDROCHLORIDE 25 MG/1
25 TABLET, FILM COATED ORAL DAILY
Qty: 30 TAB | Refills: 3 | Status: SHIPPED | OUTPATIENT
Start: 2019-09-26 | End: 2019-11-07

## 2019-09-26 NOTE — PROGRESS NOTES
Chief Complaint   Patient presents with    Elbow Pain     Patient presents in office today with c/o right elbow pain for over a year. Not treating with anything. Also needs a refill of Flexeril. No other concerns. 1. Have you been to the ER, urgent care clinic since your last visit? Hospitalized since your last visit? Yes 7/2019 [facility] for chest cold    2. Have you seen or consulted any other health care providers outside of the [address] [place] [address] since your last visit? Include any pap smears or colon screening.  No    Learning Assessment 2/5/2018   PRIMARY LEARNER Patient   HIGHEST LEVEL OF EDUCATION - PRIMARY LEARNER  SOME COLLEGE   BARRIERS PRIMARY LEARNER NONE   CO-LEARNER CAREGIVER No   PRIMARY LANGUAGE ENGLISH   LEARNER PREFERENCE PRIMARY DEMONSTRATION   LEARNING SPECIAL TOPICS -   ANSWERED BY patient   RELATIONSHIP SELF

## 2019-09-26 NOTE — PATIENT INSTRUCTIONS
Tennis Elbow: Exercises  Introduction  Here are some examples of exercises for you to try. The exercises may be suggested for a condition or for rehabilitation. Start each exercise slowly. Ease off the exercises if you start to have pain. You will be told when to start these exercises and which ones will work best for you. How to do the exercises  Wrist flexor stretch    1. Extend your arm in front of you with your palm up. 2. Bend your wrist, pointing your hand toward the floor. 3. With your other hand, gently bend your wrist farther until you feel a mild to moderate stretch in your forearm. 4. Hold for at least 15 to 30 seconds. Repeat 2 to 4 times. Wrist extensor stretch    1. Repeat steps 1 to 4 of the stretch above but begin with your extended hand palm down. Ball or sock squeeze    1. Hold a tennis ball (or a rolled-up sock) in your hand. 2. Make a fist around the ball (or sock) and squeeze. 3. Hold for about 6 seconds, and then relax for up to 10 seconds. 4. Repeat 8 to 12 times. 5. Switch the ball (or sock) to your other hand and do 8 to 12 times. Wrist deviation    1. Sit so that your arm is supported but your hand hangs off the edge of a flat surface, such as a table. 2. Hold your hand out like you are shaking hands with someone. 3. Move your hand up and down. 4. Repeat this motion 8 to 12 times. 5. Switch arms. 6. Try to do this exercise twice with each hand. Wrist curls    1. Place your forearm on a table with your hand hanging over the edge of the table, palm up. 2. Place a 1- to 2-pound weight in your hand. This may be a dumbbell, a can of food, or a filled water bottle. 3. Slowly raise and lower the weight while keeping your forearm on the table and your palm facing up. 4. Repeat this motion 8 to 12 times. 5. Switch arms, and do steps 1 through 4.  6. Repeat with your hand facing down toward the floor. Switch arms. Biceps curls    1.  Sit leaning forward with your legs slightly spread and your left hand on your left thigh. 2. Place your right elbow on your right thigh, and hold the weight with your forearm horizontal.  3. Slowly curl the weight up and toward your chest.  4. Repeat this motion 8 to 12 times. 5. Switch arms, and do steps 1 through 4. Follow-up care is a key part of your treatment and safety. Be sure to make and go to all appointments, and call your doctor if you are having problems. It's also a good idea to know your test results and keep a list of the medicines you take. Where can you learn more? Go to http://derik-pb.info/. Enter K568 in the search box to learn more about \"Tennis Elbow: Exercises. \"  Current as of: June 26, 2019  Content Version: 12.2  © 5714-0052 [employer]. Care instructions adapted under license by [employer] (which disclaims liability or warranty for this information). If you have questions about a medical condition or this instruction, always ask your healthcare professional. [address] any warranty or liability for your use of this information.

## 2019-09-26 NOTE — PROGRESS NOTES
Anuj Lezama is a 41 y.o. male   Chief Complaint   Patient presents with    Elbow Pain    Medication Refill    pt states he does a lot of typing with his R hand at work and states he has been having R elbow pain and has been wearing a brace/ternnis elbow wrap and this helped for a while. States this first started 2 years prior. States now with lifting is hurting. Not taking anything for this. Pt requesting refill of flexeril. Pt would like something else for anxiety does not tolerate the buspar. Pt is on wellbutrin and paxil but paxil effects him sexually. Discussed adding zoloft with hope of switching to this from paxil. he is a 41y.o. year old male who presents for evalution. Reviewed PmHx, RxHx, FmHx, SocHx, AllgHx and updated and dated in the chart. Review of Systems - negative except as listed above in the HPI    Objective:     Vitals:    09/26/19 1437   BP: 128/90   Pulse: 89   Resp: 16   Temp: 98.6 °F (37 °C)   TempSrc: Oral   SpO2: 95%   Weight: 251 lb (113.9 kg)   Height: 5' 6\" (1.676 m)       Current Outpatient Medications   Medication Sig    cyclobenzaprine (FLEXERIL) 10 mg tablet Take 1 Tab by mouth three (3) times daily as needed for Muscle Spasm(s).  methylPREDNISolone acetate (DEPO-MEDROL) 80 mg/mL injection 1 mL by Intra artICUlar route once for 1 dose.  lidocaine (XYLOCAINE) 10 mg/mL (1 %) injection 2 mL by Intra artICUlar route once for 1 dose.  sertraline (ZOLOFT) 25 mg tablet Take 1 Tab by mouth daily.  PARoxetine (PAXIL) 10 mg tablet TAKE 1 TABLET BY MOUTH EVERY DAY    buPROPion XL (WELLBUTRIN XL) 150 mg tablet Take 1 Tab by mouth every morning.  triamcinolone acetonide (KENALOG) 0.1 % topical cream Apply  to affected area two (2) times a day. use thin layer    buPROPion XL (WELLBUTRIN XL) 300 mg XL tablet Take 1 Tab by mouth every morning. No current facility-administered medications for this visit.         Physical Examination: General appearance - alert, well appearing, and in no distress  Chest - clear to auscultation, no wheezes, rales or rhonchi, symmetric air entry  Heart - normal rate, regular rhythm, normal S1, S2, no murmurs, rubs, clicks or gallops  Musculoskeletal - ttp over R lateral epicondyle      Assessment/ Plan:   Diagnoses and all orders for this visit:    1. Lateral epicondylitis of right elbow  -     methylPREDNISolone acetate (DEPO-MEDROL) 80 mg/mL injection; 1 mL by Intra artICUlar route once for 1 dose. -     lidocaine (XYLOCAINE) 10 mg/mL (1 %) injection; 2 mL by Intra artICUlar route once for 1 dose. -     FL DRAIN/INJECT INTERMEDIATE JOINT/BURSA    2. Chronic midline low back pain with left-sided sciatica  -     cyclobenzaprine (FLEXERIL) 10 mg tablet; Take 1 Tab by mouth three (3) times daily as needed for Muscle Spasm(s). 3. Anxiety  -     sertraline (ZOLOFT) 25 mg tablet; Take 1 Tab by mouth daily. Follow-up and Dispositions    · Return if symptoms worsen or fail to improve. I have discussed the diagnosis with the patient and the intended plan as seen in the above orders. The patient has received an after-visit summary and questions were answered concerning future plans. Pt conveyed understanding of plan. Medication Side Effects and Warnings were discussed with patient      Jesus Alberto Mota DO    Patient has agreed to the procedure and understands the risk of adverse reactions. Procedure:  Joint Injection:  R lateral epicondyle    Injected joint(s) with sterile technique using 3cc of mixed 1% Lidocaine 2cc with DepoMedrol  80mg/mL 1cc with relief and no adverse reaction to procedure. Patient given instructions and expectations of after visit care. [facility]  OFFICE PROCEDURE PROGRESS NOTE        Chart reviewed for the following:   Jerri ROCKWELL DO, have reviewed the History, Physical and updated the Allergic reactions.     TIME OUT performed immediately prior to start of procedure:   Sameer ROCKWELL DO, have performed the following reviews prior to the start of the procedure:            * Patient was identified by name and date of birth   * Agreement on procedure being performed was verified  * Risks and Benefits explained to the patient  * Procedure site verified and marked as necessary  * Patient was positioned for comfort  * Consent was signed and verified     Time: 330p      Date of procedure: @date    Procedure performed by:  [address] [place] [address] DO Viky    Provider assisted by: self DO    Patient assisted by: self    How tolerated by patient: tolerated the procedure well with no complications    Post Procedural Pain Scale: 2 - Hurts Little Bit    Comments: none

## 2019-11-07 ENCOUNTER — OFFICE VISIT (OUTPATIENT)
Dept: FAMILY MEDICINE CLINIC | Age: 39
End: 2019-11-07

## 2019-11-07 VITALS
DIASTOLIC BLOOD PRESSURE: 98 MMHG | SYSTOLIC BLOOD PRESSURE: 138 MMHG | BODY MASS INDEX: 40.82 KG/M2 | WEIGHT: 254 LBS | TEMPERATURE: 98.5 F | HEIGHT: 66 IN | OXYGEN SATURATION: 93 % | HEART RATE: 91 BPM | RESPIRATION RATE: 16 BRPM

## 2019-11-07 DIAGNOSIS — Z23 ENCOUNTER FOR IMMUNIZATION: ICD-10-CM

## 2019-11-07 DIAGNOSIS — F41.9 ANXIETY: Primary | ICD-10-CM

## 2019-11-07 RX ORDER — SERTRALINE HYDROCHLORIDE 50 MG/1
50 TABLET, FILM COATED ORAL DAILY
Qty: 30 TAB | Refills: 3 | Status: SHIPPED | OUTPATIENT
Start: 2019-11-07 | End: 2019-12-02 | Stop reason: SDUPTHER

## 2019-11-07 NOTE — PROGRESS NOTES
Chief Complaint   Patient presents with    Follow-up    Immunization/Injection     Patient presents in office today for 6 week f/u. Would like to get a flu shot today. No concerns. 1. Have you been to the ER, urgent care clinic since your last visit? Hospitalized since your last visit? No    2. Have you seen or consulted any other health care providers outside of the [address] [place] [address] since your last visit? Include any pap smears or colon screening.  No      Learning Assessment 2/5/2018   PRIMARY LEARNER Patient   HIGHEST LEVEL OF EDUCATION - PRIMARY LEARNER  SOME COLLEGE   BARRIERS PRIMARY LEARNER NONE   CO-LEARNER CAREGIVER No   PRIMARY LANGUAGE ENGLISH   LEARNER PREFERENCE PRIMARY DEMONSTRATION   LEARNING SPECIAL TOPICS -   ANSWERED BY patient   RELATIONSHIP SELF

## 2019-11-07 NOTE — PATIENT INSTRUCTIONS
A Healthy Lifestyle: Care Instructions  Your Care Instructions    A healthy lifestyle can help you feel good, stay at a healthy weight, and have plenty of energy for both work and play. A healthy lifestyle is something you can share with your whole family. A healthy lifestyle also can lower your risk for serious health problems, such as high blood pressure, heart disease, and diabetes. You can follow a few steps listed below to improve your health and the health of your family. Follow-up care is a key part of your treatment and safety. Be sure to make and go to all appointments, and call your doctor if you are having problems. It's also a good idea to know your test results and keep a list of the medicines you take. How can you care for yourself at home? · Do not eat too much sugar, fat, or fast foods. You can still have dessert and treats now and then. The goal is moderation. · Start small to improve your eating habits. Pay attention to portion sizes, drink less juice and soda pop, and eat more fruits and vegetables. ? Eat a healthy amount of food. A 3-ounce serving of meat, for example, is about the size of a deck of cards. Fill the rest of your plate with vegetables and whole grains. ? Limit the amount of soda and sports drinks you have every day. Drink more water when you are thirsty. ? Eat at least 5 servings of fruits and vegetables every day. It may seem like a lot, but it is not hard to reach this goal. A serving or helping is 1 piece of fruit, 1 cup of vegetables, or 2 cups of leafy, raw vegetables. Have an apple or some carrot sticks as an afternoon snack instead of a candy bar. Try to have fruits and/or vegetables at every meal.  · Make exercise part of your daily routine. You may want to start with simple activities, such as walking, bicycling, or slow swimming. Try to be active 30 to 60 minutes every day. You do not need to do all 30 to 60 minutes all at once.  For example, you can exercise 3 times a day for 10 or 20 minutes. Moderate exercise is safe for most people, but it is always a good idea to talk to your doctor before starting an exercise program.  · Keep moving. Kadeem Glance the lawn, work in the garden, or Alegr[place]­a. Take the stairs instead of the elevator at work. · If you smoke, quit. People who smoke have an increased risk for heart attack, stroke, cancer, and other lung illnesses. Quitting is hard, but there are ways to boost your chance of quitting tobacco for good. ? Use nicotine gum, patches, or lozenges. ? Ask your doctor about stop-smoking programs and medicines. ? Keep trying. In addition to reducing your risk of diseases in the future, you will notice some benefits soon after you stop using tobacco. If you have shortness of breath or asthma symptoms, they will likely get better within a few weeks after you quit. · Limit how much alcohol you drink. Moderate amounts of alcohol (up to 2 drinks a day for men, 1 drink a day for women) are okay. But drinking too much can lead to liver problems, high blood pressure, and other health problems. Family health  If you have a family, there are many things you can do together to improve your health. · Eat meals together as a family as often as possible. · Eat healthy foods. This includes fruits, vegetables, lean meats and dairy, and whole grains. · Include your family in your fitness plan. Most people think of activities such as jogging or tennis as the way to fitness, but there are many ways you and your family can be more active. Anything that makes you breathe hard and gets your heart pumping is exercise. Here are some tips:  ? Walk to do errands or to take your child to school or the bus.  ? Go for a family bike ride after dinner instead of watching TV. Where can you learn more? Go to http://derik-pb.info/. Enter W460 in the search box to learn more about \"A Healthy Lifestyle: Care Instructions. \"  Current as of: May 28, 2019  Content Version: 12.2  © 2824-5644 [employer]. Care instructions adapted under license by [employer] (which disclaims liability or warranty for this information). If you have questions about a medical condition or this instruction, always ask your healthcare professional. [address] any warranty or liability for your use of this information.

## 2019-11-07 NOTE — PROGRESS NOTES
Lisseth Calvo is a 46 y.o. male   Chief Complaint   Patient presents with    Follow-up    Immunization/Injection    pt here for follow up on his anxiety and depression and states that his anxiety is better and his sexual side effects are gone since stopping the paxil. Depression remains no SI/HI. On wellbutrin 450 daily. Pt got good relief from his R elbow injection. he is a 46y.o. year old male who presents for evalution. Reviewed PmHx, RxHx, FmHx, SocHx, AllgHx and updated and dated in the chart. Review of Systems - negative except as listed above in the HPI    Objective:     Vitals:    11/07/19 0751 11/07/19 0821   BP: (!) 130/93 (!) 138/98   Pulse: 91    Resp: 16    Temp: 98.5 °F (36.9 °C)    TempSrc: Oral    SpO2: 93%    Weight: 254 lb (115.2 kg)    Height: 5' 6\" (1.676 m)        Current Outpatient Medications   Medication Sig    sertraline (ZOLOFT) 50 mg tablet Take 1 Tab by mouth daily.  cyclobenzaprine (FLEXERIL) 10 mg tablet Take 1 Tab by mouth three (3) times daily as needed for Muscle Spasm(s).  buPROPion XL (WELLBUTRIN XL) 150 mg tablet Take 1 Tab by mouth every morning.  triamcinolone acetonide (KENALOG) 0.1 % topical cream Apply  to affected area two (2) times a day. use thin layer    buPROPion XL (WELLBUTRIN XL) 300 mg XL tablet Take 1 Tab by mouth every morning. No current facility-administered medications for this visit. Physical Examination: General appearance - alert, well appearing, and in no distress  Chest - clear to auscultation, no wheezes, rales or rhonchi, symmetric air entry  Heart - normal rate, regular rhythm, normal S1, S2, no murmurs, rubs, clicks or gallops      Assessment/ Plan:   Diagnoses and all orders for this visit:    1. Anxiety  -     sertraline (ZOLOFT) 50 mg tablet; Take 1 Tab by mouth daily.     2. Encounter for immunization  -     INFLUENZA VIRUS VAC QUAD,SPLIT,PRESV FREE SYRINGE IM  -     [place] IMMUNIZ ADMIN,1 SINGLE/COMB VAC/TOXOID       Follow-up and Dispositions    · Return if symptoms worsen or fail to improve. I have discussed the diagnosis with the patient and the intended plan as seen in the above orders. The patient has received an after-visit summary and questions were answered concerning future plans. Pt conveyed understanding of plan.     Medication Side Effects and Warnings were discussed with patient      Oluary Officer, DO

## 2019-12-02 DIAGNOSIS — F41.9 ANXIETY: ICD-10-CM

## 2019-12-02 RX ORDER — SERTRALINE HYDROCHLORIDE 50 MG/1
50 TABLET, FILM COATED ORAL DAILY
Qty: 30 TAB | Refills: 3 | Status: SHIPPED | OUTPATIENT
Start: 2019-12-02 | End: 2020-03-18 | Stop reason: SDUPTHER

## 2020-01-14 RX ORDER — BUPROPION HYDROCHLORIDE 300 MG/1
TABLET ORAL
Qty: 30 TAB | Refills: 7 | Status: SHIPPED | OUTPATIENT
Start: 2020-01-14 | End: 2020-02-11 | Stop reason: SDUPTHER

## 2020-02-11 RX ORDER — BUPROPION HYDROCHLORIDE 300 MG/1
TABLET ORAL
Qty: 30 TAB | Refills: 5 | Status: SHIPPED | OUTPATIENT
Start: 2020-02-11 | End: 2020-04-27 | Stop reason: SDUPTHER

## 2020-02-25 DIAGNOSIS — M54.42 CHRONIC MIDLINE LOW BACK PAIN WITH LEFT-SIDED SCIATICA: ICD-10-CM

## 2020-02-25 DIAGNOSIS — G89.29 CHRONIC MIDLINE LOW BACK PAIN WITH LEFT-SIDED SCIATICA: ICD-10-CM

## 2020-02-26 RX ORDER — CYCLOBENZAPRINE HCL 10 MG
TABLET ORAL
Qty: 60 TAB | Refills: 2 | Status: SHIPPED | OUTPATIENT
Start: 2020-02-26 | End: 2020-06-25

## 2020-04-29 RX ORDER — BUPROPION HYDROCHLORIDE 300 MG/1
TABLET ORAL
Qty: 30 TAB | Refills: 5 | Status: SHIPPED | OUTPATIENT
Start: 2020-04-29 | End: 2020-05-20 | Stop reason: SDUPTHER

## 2020-05-20 RX ORDER — BUPROPION HYDROCHLORIDE 300 MG/1
TABLET ORAL
Qty: 90 TAB | Refills: 1 | Status: SHIPPED | OUTPATIENT
Start: 2020-05-20 | End: 2021-10-01 | Stop reason: SDUPTHER

## 2020-06-23 DIAGNOSIS — G89.29 CHRONIC MIDLINE LOW BACK PAIN WITH LEFT-SIDED SCIATICA: ICD-10-CM

## 2020-06-23 DIAGNOSIS — M54.42 CHRONIC MIDLINE LOW BACK PAIN WITH LEFT-SIDED SCIATICA: ICD-10-CM

## 2020-06-25 RX ORDER — CYCLOBENZAPRINE HCL 10 MG
TABLET ORAL
Qty: 60 TAB | Refills: 2 | Status: SHIPPED | OUTPATIENT
Start: 2020-06-25 | End: 2020-09-20 | Stop reason: SDUPTHER

## 2020-09-13 RX ORDER — BUPROPION HYDROCHLORIDE 150 MG/1
TABLET ORAL
Qty: 30 TAB | Refills: 11 | Status: SHIPPED | OUTPATIENT
Start: 2020-09-13 | End: 2020-12-29 | Stop reason: SDUPTHER

## 2020-09-18 DIAGNOSIS — G89.29 CHRONIC MIDLINE LOW BACK PAIN WITH LEFT-SIDED SCIATICA: ICD-10-CM

## 2020-09-18 DIAGNOSIS — M54.42 CHRONIC MIDLINE LOW BACK PAIN WITH LEFT-SIDED SCIATICA: ICD-10-CM

## 2020-09-20 RX ORDER — CYCLOBENZAPRINE HCL 10 MG
TABLET ORAL
Qty: 60 TAB | Refills: 2 | Status: SHIPPED | OUTPATIENT
Start: 2020-09-20 | End: 2020-12-07

## 2020-11-24 ENCOUNTER — OFFICE VISIT (OUTPATIENT)
Dept: FAMILY MEDICINE CLINIC | Age: 40
End: 2020-11-24
Payer: COMMERCIAL

## 2020-11-24 VITALS
OXYGEN SATURATION: 96 % | RESPIRATION RATE: 16 BRPM | DIASTOLIC BLOOD PRESSURE: 88 MMHG | SYSTOLIC BLOOD PRESSURE: 135 MMHG | HEIGHT: 66 IN | WEIGHT: 252 LBS | BODY MASS INDEX: 40.5 KG/M2 | HEART RATE: 96 BPM | TEMPERATURE: 98.3 F

## 2020-11-24 DIAGNOSIS — R19.05 PERIUMBILICAL MASS: Primary | ICD-10-CM

## 2020-11-24 DIAGNOSIS — F41.9 ANXIETY: ICD-10-CM

## 2020-11-24 DIAGNOSIS — E66.01 OBESITY, MORBID (HCC): ICD-10-CM

## 2020-11-24 DIAGNOSIS — M77.11 LATERAL EPICONDYLITIS OF RIGHT ELBOW: ICD-10-CM

## 2020-11-24 DIAGNOSIS — F33.9 RECURRENT DEPRESSION (HCC): ICD-10-CM

## 2020-11-24 DIAGNOSIS — L30.9 ECZEMA, UNSPECIFIED TYPE: ICD-10-CM

## 2020-11-24 DIAGNOSIS — R19.05 PERIUMBILICAL MASS: ICD-10-CM

## 2020-11-24 PROCEDURE — 99214 OFFICE O/P EST MOD 30 MIN: CPT | Performed by: FAMILY MEDICINE

## 2020-11-24 PROCEDURE — 20605 DRAIN/INJ JOINT/BURSA W/O US: CPT | Performed by: FAMILY MEDICINE

## 2020-11-24 RX ORDER — METHYLPREDNISOLONE ACETATE 80 MG/ML
80 INJECTION, SUSPENSION INTRA-ARTICULAR; INTRALESIONAL; INTRAMUSCULAR; SOFT TISSUE ONCE
Qty: 1 ML | Refills: 0
Start: 2020-11-24 | End: 2020-11-24

## 2020-11-24 RX ORDER — LIDOCAINE HYDROCHLORIDE 10 MG/ML
2 INJECTION INFILTRATION; PERINEURAL ONCE
Qty: 2 ML | Refills: 0
Start: 2020-11-24 | End: 2020-11-24

## 2020-11-24 RX ORDER — TRIAMCINOLONE ACETONIDE 1 MG/G
CREAM TOPICAL 2 TIMES DAILY
Qty: 60 G | Refills: 5 | Status: SHIPPED | OUTPATIENT
Start: 2020-11-24

## 2020-11-24 RX ORDER — SERTRALINE HYDROCHLORIDE 100 MG/1
100 TABLET, FILM COATED ORAL DAILY
Qty: 30 TAB | Refills: 2 | Status: SHIPPED | OUTPATIENT
Start: 2020-11-24 | End: 2020-12-28 | Stop reason: SDUPTHER

## 2020-11-24 NOTE — PATIENT INSTRUCTIONS
A Healthy Lifestyle: Care Instructions Your Care Instructions A healthy lifestyle can help you feel good, stay at a healthy weight, and have plenty of energy for both work and play. A healthy lifestyle is something you can share with your whole family. A healthy lifestyle also can lower your risk for serious health problems, such as high blood pressure, heart disease, and diabetes. You can follow a few steps listed below to improve your health and the health of your family. Follow-up care is a key part of your treatment and safety. Be sure to make and go to all appointments, and call your doctor if you are having problems. It's also a good idea to know your test results and keep a list of the medicines you take. How can you care for yourself at home? · Do not eat too much sugar, fat, or fast foods. You can still have dessert and treats now and then. The goal is moderation. · Start small to improve your eating habits. Pay attention to portion sizes, drink less juice and soda pop, and eat more fruits and vegetables. ? Eat a healthy amount of food. A 3-ounce serving of meat, for example, is about the size of a deck of cards. Fill the rest of your plate with vegetables and whole grains. ? Limit the amount of soda and sports drinks you have every day. Drink more water when you are thirsty. ? Eat at least 5 servings of fruits and vegetables every day. It may seem like a lot, but it is not hard to reach this goal. A serving or helping is 1 piece of fruit, 1 cup of vegetables, or 2 cups of leafy, raw vegetables. Have an apple or some carrot sticks as an afternoon snack instead of a candy bar. Try to have fruits and/or vegetables at every meal. 
· Make exercise part of your daily routine. You may want to start with simple activities, such as walking, bicycling, or slow swimming. Try to be active 30 to 60 minutes every day. You do not need to do all 30 to 60 minutes all at once.  For example, you can exercise 3 times a day for 10 or 20 minutes. Moderate exercise is safe for most people, but it is always a good idea to talk to your doctor before starting an exercise program. 
· Keep moving. Gerhard Kerrick the lawn, work in the garden, or [employer]. Take the stairs instead of the elevator at work. · If you smoke, quit. People who smoke have an increased risk for heart attack, stroke, cancer, and other lung illnesses. Quitting is hard, but there are ways to boost your chance of quitting tobacco for good. ? Use nicotine gum, patches, or lozenges. ? Ask your doctor about stop-smoking programs and medicines. ? Keep trying. In addition to reducing your risk of diseases in the future, you will notice some benefits soon after you stop using tobacco. If you have shortness of breath or asthma symptoms, they will likely get better within a few weeks after you quit. · Limit how much alcohol you drink. Moderate amounts of alcohol (up to 2 drinks a day for men, 1 drink a day for women) are okay. But drinking too much can lead to liver problems, high blood pressure, and other health problems. Family health If you have a family, there are many things you can do together to improve your health. · Eat meals together as a family as often as possible. · Eat healthy foods. This includes fruits, vegetables, lean meats and dairy, and whole grains. · Include your family in your fitness plan. Most people think of activities such as jogging or tennis as the way to fitness, but there are many ways you and your family can be more active. Anything that makes you breathe hard and gets your heart pumping is exercise. Here are some tips: 
? Walk to do errands or to take your child to school or the bus. 
? Go for a family bike ride after dinner instead of watching TV. Where can you learn more? Go to http://www.gray.com/ Enter F732 in the search box to learn more about \"A Healthy Lifestyle: Care Instructions. \" Current as of: January 31, 2020               Content Version: 12.6 © 8931-9707 [employer]. Care instructions adapted under license by [employer] (which disclaims liability or warranty for this information). If you have questions about a medical condition or this instruction, always ask your healthcare professional. [address] any warranty or liability for your use of this information.

## 2020-11-24 NOTE — PROGRESS NOTES
Progress Note    he is a 45y.o. year old male who presents for evalution. Subjective:     Pt states he has a lump just above umbilicus and states noticed in march and avoided coming due to covid. No pain but can be uncomfortable with palpation. No hx of abd surgeries. Sometimes bigger sometimes smaller. Noticed when had constipation. Constipation has since passed stool is soft but not as firm as usual.      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 Uses a compression sleeve when type also has a tens unit has been using the past week. He is requesting an injection if possible. Pt also reports his  Depression is not well controlled currently. States had dark thoughts last week was having suicidal thoughts no plan. Would like Rx for eczema  And discussed not to use steroid on face and given samples of eucrisa for face. Reviewed PmHx, RxHx, FmHx, SocHx, AllgHx and updated and dated in the chart. Review of Systems - negative except as listed above in the HPI    Objective:     Vitals:    11/24/20 1503   BP: 135/88   Pulse: 96   Resp: 16   Temp: 98.3 °F (36.8 °C)   TempSrc: Oral   SpO2: 96%   Weight: 252 lb (114.3 kg)   Height: 5' 6\" (1.676 m)       Current Outpatient Medications   Medication Sig    triamcinolone acetonide (KENALOG) 0.1 % topical cream Apply  to affected area two (2) times a day. use thin layer    sertraline (ZOLOFT) 100 mg tablet Take 1 Tab by mouth daily.  methylPREDNISolone acetate (DEPO-MedroL) 80 mg/mL injection 1 mL by IntraMUSCular route once for 1 dose.  lidocaine (XYLOCAINE) 10 mg/mL (1 %) injection 2 mL by IntraDERMal route once for 1 dose.     cyclobenzaprine (FLEXERIL) 10 mg tablet TAKE 1 TABLET BY MOUTH THREE TIMES A DAY AS NEEDED FOR MUSCLE SPASMS    buPROPion XL (WELLBUTRIN XL) 150 mg tablet TAKE 1 TABLET BY MOUTH EVERY DAY IN THE MORNING    buPROPion XL (WELLBUTRIN XL) 300 mg XL tablet TAKE 1 TABLET BY MOUTH EVERY MORNING     No current facility-administered medications for this visit. Physical Examination: General appearance - alert, well appearing, and in no distress  Mental status - alert, oriented to person, place, and time  Chest - clear to auscultation, no wheezes, rales or rhonchi, symmetric air entry  Heart - normal rate, regular rhythm, normal S1, S2, no murmurs, rubs, clicks or gallops  Abdomen -abdominal mass palpated superior to umbilicus nontender nonpulsatile and subcutaneous tissues does not feel intra-abdominal  Musculoskeletal -tenderness over right lateral epicondyle, pain with resisted pronation of right hand    Assessment/ Plan:   Diagnoses and all orders for this visit:    1. Periumbilical mass  -     US ABD LTD; Future    2. Eczema, unspecified type  -     triamcinolone acetonide (KENALOG) 0.1 % topical cream; Apply  to affected area two (2) times a day. use thin layer    3. Anxiety  -     sertraline (ZOLOFT) 100 mg tablet; Take 1 Tab by mouth daily. 4. Recurrent depression (HCC)  Increase Zoloft to 100 continues Wellbutrin 450 mg daily follow-up in 1 month  5. Obesity, morbid (HCC)    6. Lateral epicondylitis of right elbow  -     methylPREDNISolone acetate (DEPO-MedroL) 80 mg/mL injection; 1 mL by IntraMUSCular route once for 1 dose. -     lidocaine (XYLOCAINE) 10 mg/mL (1 %) injection; 2 mL by IntraDERMal route once for 1 dose. -     DRAIN/INJECT INTERMEDIATE JOINT/BURSA       Follow-up and Dispositions    · Return in about 4 weeks (around 12/22/2020), or if symptoms worsen or fail to improve. I have discussed the diagnosis with the patient and the intended plan as seen in the above orders. The patient has received an after-visit summary and questions were answered concerning future plans. Pt conveyed understanding of plan.     Medication Side Effects and Warnings were discussed with patient      Katerina Sanches,     Patient has agreed to the procedure and understands the risk of adverse reactions. Procedure:  Joint Injection:  R elbow    Injected joint(s) with sterile technique using 3cc of mixed 1% Lidocaine 2cc with DepoMedrol  80mg/mL 1cc with relief and no adverse reaction to procedure. Patient given instructions and expectations of after visit care. [facility]  OFFICE PROCEDURE PROGRESS NOTE        Chart reviewed for the following:   Juan J ROCKWELL DO, have reviewed the History, Physical and updated the Allergic reactions.     TIME OUT performed immediately prior to start of procedure:   Juan J ROCKWELL DO, have performed the following reviews prior to the start of the procedure:            * Patient was identified by name and date of birth   * Agreement on procedure being performed was verified  * Risks and Benefits explained to the patient  * Procedure site verified and marked as necessary  * Patient was positioned for comfort  * Consent was signed and verified     Time: 355p      Date of procedure: 11/24/2020    Procedure performed by:  Joel Arreola DO    Provider assisted by: Self DO    Patient assisted by: self    How tolerated by patient: tolerated the procedure well with no complications    Post Procedural Pain Scale: 2 - Hurts Little Bit    Comments: none

## 2020-11-24 NOTE — PROGRESS NOTES
Chief Complaint   Patient presents with    Mass    Elbow Pain     Patient presents in office today with c/o a lump around his belly button. Also has c/o right elbow pain. Would like to see about getting an injection. Would also like to discuss adjusting his depression meds. No other concerns. 1. Have you been to the ER, urgent care clinic since your last visit? Hospitalized since your last visit? No    2. Have you seen or consulted any other health care providers outside of the [address] [place] [address] since your last visit? Include any pap smears or colon screening.  No    Learning Assessment 2/5/2018   PRIMARY LEARNER Patient   HIGHEST LEVEL OF EDUCATION - PRIMARY LEARNER  SOME COLLEGE   BARRIERS PRIMARY LEARNER NONE   CO-LEARNER CAREGIVER No   PRIMARY LANGUAGE ENGLISH   LEARNER PREFERENCE PRIMARY DEMONSTRATION   LEARNING SPECIAL TOPICS -   ANSWERED BY patient   RELATIONSHIP SELF

## 2020-12-06 DIAGNOSIS — M54.42 CHRONIC MIDLINE LOW BACK PAIN WITH LEFT-SIDED SCIATICA: ICD-10-CM

## 2020-12-06 DIAGNOSIS — G89.29 CHRONIC MIDLINE LOW BACK PAIN WITH LEFT-SIDED SCIATICA: ICD-10-CM

## 2020-12-07 RX ORDER — CYCLOBENZAPRINE HCL 10 MG
TABLET ORAL
Qty: 60 TAB | Refills: 2 | Status: SHIPPED | OUTPATIENT
Start: 2020-12-07 | End: 2021-03-09

## 2020-12-09 ENCOUNTER — HOSPITAL ENCOUNTER (OUTPATIENT)
Dept: ULTRASOUND IMAGING | Age: 40
Discharge: HOME | End: 2020-12-09
Attending: FAMILY MEDICINE
Payer: COMMERCIAL

## 2020-12-09 PROCEDURE — 76705 ECHO EXAM OF ABDOMEN: CPT

## 2020-12-09 NOTE — PROGRESS NOTES
This is an umbilical hernia with intra-abdominal fat pushing through. If this is bothering him then I would recommend he make a appointment with surgery[facility]   Their number is [phone]

## 2020-12-09 NOTE — PROGRESS NOTES
Patient RC to office. Spoke with patient in reference to results. Gave him the number to general surgery at [facility]. Patient verbalized understanding.

## 2020-12-16 ENCOUNTER — OFFICE VISIT (OUTPATIENT)
Dept: SURGERY | Age: 40
End: 2020-12-16
Payer: COMMERCIAL

## 2020-12-16 VITALS
DIASTOLIC BLOOD PRESSURE: 90 MMHG | SYSTOLIC BLOOD PRESSURE: 137 MMHG | OXYGEN SATURATION: 98 % | HEIGHT: 66 IN | BODY MASS INDEX: 38.57 KG/M2 | WEIGHT: 240 LBS | HEART RATE: 96 BPM | RESPIRATION RATE: 18 BRPM | TEMPERATURE: 99.1 F

## 2020-12-16 DIAGNOSIS — K43.6 INCARCERATED VENTRAL HERNIA: ICD-10-CM

## 2020-12-16 PROCEDURE — 99203 OFFICE O/P NEW LOW 30 MIN: CPT | Performed by: SURGERY

## 2020-12-16 NOTE — PROGRESS NOTES
1. Have you been to the ER, urgent care clinic since your last visit? Hospitalized since your last visit? No    2. Have you seen or consulted any other health care providers outside of the [address] [place] [address] since your last visit? Include any pap smears or colon screening.  No

## 2020-12-16 NOTE — H&P (VIEW-ONLY)
Surgery History and Physical 
 
Subjective:  
  
Librado Carrera is a 45 y.o. white male who presents for evaluation of a ventral hernia. For the past 9 months, Mr. Heladio Borges has had a bulge above his umbilicus. He first noticed the bulge after lifting weeds in his yard. The bulge fluctuates in size and is now causing more discomfort. He is eating fine and moving his bowels normally. He denies any previous hernia repairs or abdominal procedures. Past Medical History:  
Diagnosis Date  Anxiety disorder  Arthritis   
 back  Depression  Obesity, Class II, BMI 35-39.9  Tendonitis   
 left wrist  
 
Past Surgical History:  
Procedure Laterality Date  HX ENDOSCOPY    
 HX KNEE ARTHROSCOPY Left  HX ORTHOPAEDIC Left Repair of left wrist tendon. Family History Problem Relation Age of Onset  Depression Mother  Alcohol abuse Mother  Psychiatric Disorder Mother  Diabetes Father  Heart Disease Father  Hypertension Father Social History Tobacco Use  Smoking status: Former Smoker Years: 15.00  Smokeless tobacco: Never Used Substance Use Topics  Alcohol use: Yes Alcohol/week: 2.0 - 3.0 standard drinks Types: 2 - 3 Cans of beer per week Comment: daily Prior to Admission medications Medication Sig Start Date End Date Taking? Authorizing Provider  
sertraline (ZOLOFT) 100 mg tablet Take 1 Tab by mouth daily. 11/24/20  Yes Lorenzo Ibrahim DO  
buPROPion XL (WELLBUTRIN XL) 150 mg tablet TAKE 1 TABLET BY MOUTH EVERY DAY IN THE MORNING 9/13/20  Yes Maximo Vera MD  
buPROPion XL (WELLBUTRIN XL) 300 mg XL tablet TAKE 1 TABLET BY MOUTH EVERY MORNING 5/20/20  Yes Lorenzo Ibrahim DO  
cyclobenzaprine (FLEXERIL) 10 mg tablet TAKE 1 TABLET BY MOUTH THREE TIMES A DAY AS NEEDED FOR MUSCLE SPASMS 12/7/20   Maximo Vera MD  
triamcinolone acetonide (KENALOG) 0.1 % topical cream Apply  to affected area two (2) times a day.  use thin layer 11/24/20   Flaco Gillis, DO No Known Allergies Review of Systems: A comprehensive review of systems was negative except for that written in the History of Present Illness. Objective:  
 
 Physical Exam: 
GENERAL: alert, cooperative, no distress, appears older than stated age, EYE: negative findings: anicteric sclera, LYMPHATIC: Cervical, supraclavicular nodes normal. , THROAT & NECK: normal, LUNG: clear to auscultation bilaterally, HEART: regular rate and rhythm, ABDOMEN: Soft, obese, NT, ND. There is an incarcerated supraumbilical hernia., EXTREMITIES:  no edema, SKIN: Normal., NEUROLOGIC: negative, PSYCHIATRIC: non focal 
 
Assessment:  
 
Incarcerated supraumbilical hernia without gangrene or obstruction. Plan:  
 
Mr. Erendira Thao would like to have his hernia repaired, but wants to wait until after the holidays. I discussed the risks of the procedure including bleeding, infection, wound healing problems, blood clots, mesh complication, injury to the bowel, recurrent hernia, seroma, and reaction to the prep or local and general anesthetic. He understands the risks; any and all questions were answered to his satisfaction. Mr. Erendira Thao will be scheduled for an elective outpatient robotic-assisted laparoscopic supraumbilical herniorrhaphy with mesh, possible open under general anesthesia. The patient was counseled at length about the risks of rocio Covid-19 during their perioperative period and any recovery window from their procedure. The patient was made aware that rocio Covid-19  may worsen their prognosis for recovering from their procedure and lend to a higher morbidity and/or mortality risk. All material risks, benefits, and reasonable alternatives including postponing the procedure were discussed. The patient does wish to proceed with the procedure at this time.

## 2020-12-16 NOTE — PROGRESS NOTES
Surgery History and Physical    Subjective:      Owen Ruggiero is a 45 y.o. white male who presents for evaluation of a ventral hernia. For the past 9 months, Mr. Shayan Mesa has had a bulge above his umbilicus. He first noticed the bulge after lifting weeds in his yard. The bulge fluctuates in size and is now causing more discomfort. He is eating fine and moving his bowels normally. He denies any previous hernia repairs or abdominal procedures. Past Medical History:   Diagnosis Date    Anxiety disorder     Arthritis     back    Depression     Obesity, Class II, BMI 35-39.9     Tendonitis     left wrist     Past Surgical History:   Procedure Laterality Date    HX ENDOSCOPY      HX KNEE ARTHROSCOPY Left     HX ORTHOPAEDIC Left     Repair of left wrist tendon. Family History   Problem Relation Age of Onset    Depression Mother     Alcohol abuse Mother     Psychiatric Disorder Mother     Diabetes Father     Heart Disease Father     Hypertension Father      Social History     Tobacco Use    Smoking status: Former Smoker     Years: 15.00    Smokeless tobacco: Never Used   Substance Use Topics    Alcohol use: Yes     Alcohol/week: 2.0 - 3.0 standard drinks     Types: 2 - 3 Cans of beer per week     Comment: daily      Prior to Admission medications    Medication Sig Start Date End Date Taking? Authorizing Provider   sertraline (ZOLOFT) 100 mg tablet Take 1 Tab by mouth daily. 11/24/20  Yes Lorenzo Ibrahim DO   buPROPion XL (WELLBUTRIN XL) 150 mg tablet TAKE 1 TABLET BY MOUTH EVERY DAY IN THE MORNING 9/13/20  Yes Zenia Arana MD   buPROPion XL (WELLBUTRIN XL) 300 mg XL tablet TAKE 1 TABLET BY MOUTH EVERY MORNING 5/20/20  Yes Lorenzo Ibrahim DO   cyclobenzaprine (FLEXERIL) 10 mg tablet TAKE 1 TABLET BY MOUTH THREE TIMES A DAY AS NEEDED FOR MUSCLE SPASMS 12/7/20   Zenia Arana MD   triamcinolone acetonide (KENALOG) 0.1 % topical cream Apply  to affected area two (2) times a day.  use thin layer 11/24/20   Wichita Cools H, DO      No Known Allergies    Review of Systems:  A comprehensive review of systems was negative except for that written in the History of Present Illness. Objective:      Physical Exam:  GENERAL: alert, cooperative, no distress, appears older than stated age, EYE: negative findings: anicteric sclera, LYMPHATIC: Cervical, supraclavicular nodes normal. , THROAT & NECK: normal, LUNG: clear to auscultation bilaterally, HEART: regular rate and rhythm, ABDOMEN: Soft, obese, NT, ND. There is an incarcerated supraumbilical hernia., EXTREMITIES:  no edema, SKIN: Normal., NEUROLOGIC: negative, PSYCHIATRIC: non focal    Assessment:     Incarcerated supraumbilical hernia without gangrene or obstruction. Plan:     Mr. Dimitri Gomez would like to have his hernia repaired, but wants to wait until after the holidays. I discussed the risks of the procedure including bleeding, infection, wound healing problems, blood clots, mesh complication, injury to the bowel, recurrent hernia, seroma, and reaction to the prep or local and general anesthetic. He understands the risks; any and all questions were answered to his satisfaction. Mr. Dimitri Gomez will be scheduled for an elective outpatient robotic-assisted laparoscopic supraumbilical herniorrhaphy with mesh, possible open under general anesthesia. The patient was counseled at length about the risks of rocio Covid-19 during their perioperative period and any recovery window from their procedure. The patient was made aware that rocio Covid-19  may worsen their prognosis for recovering from their procedure and lend to a higher morbidity and/or mortality risk. All material risks, benefits, and reasonable alternatives including postponing the procedure were discussed. The patient does wish to proceed with the procedure at this time.

## 2020-12-28 DIAGNOSIS — F41.9 ANXIETY: ICD-10-CM

## 2020-12-29 RX ORDER — SERTRALINE HYDROCHLORIDE 100 MG/1
100 TABLET, FILM COATED ORAL DAILY
Qty: 30 TAB | Refills: 2 | Status: SHIPPED | OUTPATIENT
Start: 2020-12-29 | End: 2021-02-02 | Stop reason: SDUPTHER

## 2020-12-29 RX ORDER — BUPROPION HYDROCHLORIDE 150 MG/1
150 TABLET ORAL
Qty: 30 TAB | Refills: 11 | Status: SHIPPED | OUTPATIENT
Start: 2020-12-29 | End: 2021-10-17 | Stop reason: SDUPTHER

## 2021-01-02 ENCOUNTER — HOSPITAL ENCOUNTER (OUTPATIENT)
Dept: PREADMISSION TESTING | Age: 41
Discharge: HOME | End: 2021-01-02
Payer: COMMERCIAL

## 2021-01-02 PROCEDURE — 87635 SARS-COV-2 COVID-19 AMP PRB: CPT

## 2021-01-03 LAB — SARS-COV-2, COV2NT: NOT DETECTED

## 2021-01-05 ENCOUNTER — ANESTHESIA EVENT (OUTPATIENT)
Dept: SURGERY | Age: 41
End: 2021-01-05
Payer: COMMERCIAL

## 2021-01-06 ENCOUNTER — HOSPITAL ENCOUNTER (OUTPATIENT)
Age: 41
Setting detail: OUTPATIENT SURGERY
Discharge: HOME | End: 2021-01-06
Attending: SURGERY | Admitting: SURGERY
Payer: COMMERCIAL

## 2021-01-06 ENCOUNTER — ANESTHESIA (OUTPATIENT)
Dept: SURGERY | Age: 41
End: 2021-01-06
Payer: COMMERCIAL

## 2021-01-06 VITALS
SYSTOLIC BLOOD PRESSURE: 127 MMHG | WEIGHT: 236.77 LBS | BODY MASS INDEX: 38.05 KG/M2 | DIASTOLIC BLOOD PRESSURE: 77 MMHG | RESPIRATION RATE: 18 BRPM | HEART RATE: 104 BPM | OXYGEN SATURATION: 92 % | TEMPERATURE: 98 F | HEIGHT: 66 IN

## 2021-01-06 DIAGNOSIS — Z87.19 S/P LAPAROSCOPIC HERNIA REPAIR: Primary | ICD-10-CM

## 2021-01-06 DIAGNOSIS — Z98.890 S/P LAPAROSCOPIC HERNIA REPAIR: Primary | ICD-10-CM

## 2021-01-06 DIAGNOSIS — K43.6 INCARCERATED VENTRAL HERNIA: ICD-10-CM

## 2021-01-06 LAB
ANION GAP SERPL CALC-SCNC: 4 MMOL/L (ref 5–15)
BASOPHILS # BLD: 0 K/UL (ref 0–0.1)
BASOPHILS NFR BLD: 1 % (ref 0–1)
BUN SERPL-MCNC: 17 MG/DL (ref 6–20)
BUN/CREAT SERPL: 17 (ref 12–20)
CALCIUM SERPL-MCNC: 9.4 MG/DL (ref 8.5–10.1)
CHLORIDE SERPL-SCNC: 106 MMOL/L (ref 97–108)
CO2 SERPL-SCNC: 27 MMOL/L (ref 21–32)
CREAT SERPL-MCNC: 1.02 MG/DL (ref 0.7–1.3)
DIFFERENTIAL METHOD BLD: NORMAL
EOSINOPHIL # BLD: 0.1 K/UL (ref 0–0.4)
EOSINOPHIL NFR BLD: 1 % (ref 0–7)
ERYTHROCYTE [DISTWIDTH] IN BLOOD BY AUTOMATED COUNT: 11.8 % (ref 11.5–14.5)
GLUCOSE SERPL-MCNC: 99 MG/DL (ref 65–100)
HCT VFR BLD AUTO: 43 % (ref 36.6–50.3)
HGB BLD-MCNC: 15.3 G/DL (ref 12.1–17)
IMM GRANULOCYTES # BLD AUTO: 0 K/UL (ref 0–0.04)
IMM GRANULOCYTES NFR BLD AUTO: 0 % (ref 0–0.5)
LYMPHOCYTES # BLD: 1.8 K/UL (ref 0.8–3.5)
LYMPHOCYTES NFR BLD: 27 % (ref 12–49)
MCH RBC QN AUTO: 31 PG (ref 26–34)
MCHC RBC AUTO-ENTMCNC: 35.6 G/DL (ref 30–36.5)
MCV RBC AUTO: 87.2 FL (ref 80–99)
MONOCYTES # BLD: 0.5 K/UL (ref 0–1)
MONOCYTES NFR BLD: 8 % (ref 5–13)
NEUTS SEG # BLD: 4.1 K/UL (ref 1.8–8)
NEUTS SEG NFR BLD: 63 % (ref 32–75)
NRBC # BLD: 0 K/UL (ref 0–0.01)
NRBC BLD-RTO: 0 PER 100 WBC
PLATELET # BLD AUTO: 254 K/UL (ref 150–400)
PMV BLD AUTO: 9 FL (ref 8.9–12.9)
POTASSIUM SERPL-SCNC: 4.1 MMOL/L (ref 3.5–5.1)
RBC # BLD AUTO: 4.93 M/UL (ref 4.1–5.7)
SODIUM SERPL-SCNC: 137 MMOL/L (ref 136–145)
WBC # BLD AUTO: 6.5 K/UL (ref 4.1–11.1)

## 2021-01-06 PROCEDURE — 77030012770 HC TRCR OPT FX AMR -B: Performed by: SURGERY

## 2021-01-06 PROCEDURE — 76010000875 HC OR TIME 1.5 TO 2HR INTENSV - TIER 2: Performed by: SURGERY

## 2021-01-06 PROCEDURE — 74011250636 HC RX REV CODE- 250/636: Performed by: SURGERY

## 2021-01-06 PROCEDURE — 77030031139 HC SUT VCRL2 J&J -A: Performed by: SURGERY

## 2021-01-06 PROCEDURE — 80048 BASIC METABOLIC PNL TOTAL CA: CPT

## 2021-01-06 PROCEDURE — 77030003666 HC NDL SPINAL BD -A: Performed by: SURGERY

## 2021-01-06 PROCEDURE — 77030035236 HC SUT PDS STRATFX BARB J&J -B: Performed by: SURGERY

## 2021-01-06 PROCEDURE — 77030033188 HC TBNG FLTRD BIIFUR DISP CNMD -C: Performed by: SURGERY

## 2021-01-06 PROCEDURE — 77030019908 HC STETH ESOPH SIMS -A: Performed by: NURSE ANESTHETIST, CERTIFIED REGISTERED

## 2021-01-06 PROCEDURE — 74011000250 HC RX REV CODE- 250: Performed by: NURSE ANESTHETIST, CERTIFIED REGISTERED

## 2021-01-06 PROCEDURE — 77030035277 HC OBTRTR BLDELSS DISP INTU -B: Performed by: SURGERY

## 2021-01-06 PROCEDURE — 49653 PR LAP, VENTRAL HERNIA REPAIR,INCARCERATED: CPT | Performed by: SURGERY

## 2021-01-06 PROCEDURE — C9290 INJ, BUPIVACAINE LIPOSOME: HCPCS | Performed by: SURGERY

## 2021-01-06 PROCEDURE — 77030002966 HC SUT PDS J&J -A: Performed by: SURGERY

## 2021-01-06 PROCEDURE — 76210000017 HC OR PH I REC 1.5 TO 2 HR: Performed by: SURGERY

## 2021-01-06 PROCEDURE — 77030002933 HC SUT MCRYL J&J -A: Performed by: SURGERY

## 2021-01-06 PROCEDURE — 85025 COMPLETE CBC W/AUTO DIFF WBC: CPT

## 2021-01-06 PROCEDURE — 74011250636 HC RX REV CODE- 250/636: Performed by: NURSE ANESTHETIST, CERTIFIED REGISTERED

## 2021-01-06 PROCEDURE — 77030008684 HC TU ET CUF COVD -B: Performed by: NURSE ANESTHETIST, CERTIFIED REGISTERED

## 2021-01-06 PROCEDURE — 76060000034 HC ANESTHESIA 1.5 TO 2 HR: Performed by: SURGERY

## 2021-01-06 PROCEDURE — 77030013079 HC BLNKT BAIR HGGR 3M -A: Performed by: ANESTHESIOLOGY

## 2021-01-06 PROCEDURE — 77030040361 HC SLV COMPR DVT MDII -B

## 2021-01-06 PROCEDURE — 36415 COLL VENOUS BLD VENIPUNCTURE: CPT

## 2021-01-06 PROCEDURE — 74011000250 HC RX REV CODE- 250: Performed by: SURGERY

## 2021-01-06 PROCEDURE — 77030020703 HC SEAL CANN DISP INTU -B: Performed by: SURGERY

## 2021-01-06 PROCEDURE — 74011250636 HC RX REV CODE- 250/636: Performed by: ANESTHESIOLOGY

## 2021-01-06 PROCEDURE — 77030026438 HC STYL ET INTUB CARD -A: Performed by: NURSE ANESTHETIST, CERTIFIED REGISTERED

## 2021-01-06 PROCEDURE — 76210000020 HC REC RM PH II FIRST 0.5 HR: Performed by: SURGERY

## 2021-01-06 PROCEDURE — 77030018673: Performed by: SURGERY

## 2021-01-06 PROCEDURE — 77030016151 HC PROTCTR LNS DFOG COVD -B: Performed by: SURGERY

## 2021-01-06 PROCEDURE — 2709999900 HC NON-CHARGEABLE SUPPLY: Performed by: SURGERY

## 2021-01-06 PROCEDURE — 77030040922 HC BLNKT HYPOTHRM STRY -A

## 2021-01-06 PROCEDURE — 74011000258 HC RX REV CODE- 258: Performed by: SURGERY

## 2021-01-06 PROCEDURE — 77030018836 HC SOL IRR NACL ICUM -A: Performed by: SURGERY

## 2021-01-06 PROCEDURE — 77030036554: Performed by: SURGERY

## 2021-01-06 PROCEDURE — C1781 MESH (IMPLANTABLE): HCPCS | Performed by: SURGERY

## 2021-01-06 PROCEDURE — S2900 ROBOTIC SURGICAL SYSTEM: HCPCS | Performed by: SURGERY

## 2021-01-06 PROCEDURE — 77030037032 HC INSRT SCIS CLICKLLINE DISP STOR -B: Performed by: SURGERY

## 2021-01-06 DEVICE — COMPOSITE MESH,MONOFILAMENT POLYESTER WITH ABSORBABLE COLLAGEN FILM AND MARKING
Type: IMPLANTABLE DEVICE | Site: ABDOMEN | Status: FUNCTIONAL
Brand: SYMBOTEX

## 2021-01-06 RX ORDER — BUPIVACAINE HYDROCHLORIDE 5 MG/ML
INJECTION, SOLUTION EPIDURAL; INTRACAUDAL AS NEEDED
Status: DISCONTINUED | OUTPATIENT
Start: 2021-01-06 | End: 2021-01-06 | Stop reason: HOSPADM

## 2021-01-06 RX ORDER — SODIUM CHLORIDE, SODIUM LACTATE, POTASSIUM CHLORIDE, CALCIUM CHLORIDE 600; 310; 30; 20 MG/100ML; MG/100ML; MG/100ML; MG/100ML
125 INJECTION, SOLUTION INTRAVENOUS CONTINUOUS
Status: DISCONTINUED | OUTPATIENT
Start: 2021-01-06 | End: 2021-01-06 | Stop reason: HOSPADM

## 2021-01-06 RX ORDER — KETOROLAC TROMETHAMINE 30 MG/ML
INJECTION, SOLUTION INTRAMUSCULAR; INTRAVENOUS AS NEEDED
Status: DISCONTINUED | OUTPATIENT
Start: 2021-01-06 | End: 2021-01-06 | Stop reason: HOSPADM

## 2021-01-06 RX ORDER — HYDROCODONE BITARTRATE AND ACETAMINOPHEN 5; 325 MG/1; MG/1
1 TABLET ORAL
Qty: 20 TAB | Refills: 0 | Status: SHIPPED | OUTPATIENT
Start: 2021-01-06 | End: 2021-01-09

## 2021-01-06 RX ORDER — ROCURONIUM BROMIDE 10 MG/ML
INJECTION, SOLUTION INTRAVENOUS AS NEEDED
Status: DISCONTINUED | OUTPATIENT
Start: 2021-01-06 | End: 2021-01-06 | Stop reason: HOSPADM

## 2021-01-06 RX ORDER — GLYCOPYRROLATE 0.2 MG/ML
INJECTION INTRAMUSCULAR; INTRAVENOUS AS NEEDED
Status: DISCONTINUED | OUTPATIENT
Start: 2021-01-06 | End: 2021-01-06 | Stop reason: HOSPADM

## 2021-01-06 RX ORDER — LIDOCAINE HYDROCHLORIDE 20 MG/ML
INJECTION, SOLUTION EPIDURAL; INFILTRATION; INTRACAUDAL; PERINEURAL AS NEEDED
Status: DISCONTINUED | OUTPATIENT
Start: 2021-01-06 | End: 2021-01-06 | Stop reason: HOSPADM

## 2021-01-06 RX ORDER — NEOSTIGMINE METHYLSULFATE 1 MG/ML
INJECTION, SOLUTION INTRAVENOUS AS NEEDED
Status: DISCONTINUED | OUTPATIENT
Start: 2021-01-06 | End: 2021-01-06 | Stop reason: HOSPADM

## 2021-01-06 RX ORDER — ONDANSETRON 2 MG/ML
INJECTION INTRAMUSCULAR; INTRAVENOUS AS NEEDED
Status: DISCONTINUED | OUTPATIENT
Start: 2021-01-06 | End: 2021-01-06 | Stop reason: HOSPADM

## 2021-01-06 RX ORDER — DEXAMETHASONE SODIUM PHOSPHATE 4 MG/ML
INJECTION, SOLUTION INTRA-ARTICULAR; INTRALESIONAL; INTRAMUSCULAR; INTRAVENOUS; SOFT TISSUE AS NEEDED
Status: DISCONTINUED | OUTPATIENT
Start: 2021-01-06 | End: 2021-01-06 | Stop reason: HOSPADM

## 2021-01-06 RX ORDER — CRISABOROLE 20 MG/G
1 OINTMENT TOPICAL AS NEEDED
COMMUNITY
End: 2021-05-06 | Stop reason: SDUPTHER

## 2021-01-06 RX ORDER — NALOXONE HYDROCHLORIDE 0.4 MG/ML
0.2 INJECTION, SOLUTION INTRAMUSCULAR; INTRAVENOUS; SUBCUTANEOUS
Status: DISCONTINUED | OUTPATIENT
Start: 2021-01-06 | End: 2021-01-06 | Stop reason: HOSPADM

## 2021-01-06 RX ORDER — LIDOCAINE HYDROCHLORIDE 10 MG/ML
0.1 INJECTION, SOLUTION EPIDURAL; INFILTRATION; INTRACAUDAL; PERINEURAL AS NEEDED
Status: DISCONTINUED | OUTPATIENT
Start: 2021-01-06 | End: 2021-01-06 | Stop reason: HOSPADM

## 2021-01-06 RX ORDER — MIDAZOLAM HYDROCHLORIDE 1 MG/ML
INJECTION, SOLUTION INTRAMUSCULAR; INTRAVENOUS AS NEEDED
Status: DISCONTINUED | OUTPATIENT
Start: 2021-01-06 | End: 2021-01-06 | Stop reason: HOSPADM

## 2021-01-06 RX ORDER — HYDROMORPHONE HYDROCHLORIDE 1 MG/ML
.25-1 INJECTION, SOLUTION INTRAMUSCULAR; INTRAVENOUS; SUBCUTANEOUS
Status: DISCONTINUED | OUTPATIENT
Start: 2021-01-06 | End: 2021-01-06 | Stop reason: HOSPADM

## 2021-01-06 RX ORDER — PROPOFOL 10 MG/ML
INJECTION, EMULSION INTRAVENOUS AS NEEDED
Status: DISCONTINUED | OUTPATIENT
Start: 2021-01-06 | End: 2021-01-06 | Stop reason: HOSPADM

## 2021-01-06 RX ORDER — FLUMAZENIL 0.1 MG/ML
0.2 INJECTION INTRAVENOUS
Status: DISCONTINUED | OUTPATIENT
Start: 2021-01-06 | End: 2021-01-06 | Stop reason: HOSPADM

## 2021-01-06 RX ORDER — DIPHENHYDRAMINE HYDROCHLORIDE 50 MG/ML
12.5 INJECTION, SOLUTION INTRAMUSCULAR; INTRAVENOUS AS NEEDED
Status: DISCONTINUED | OUTPATIENT
Start: 2021-01-06 | End: 2021-01-06 | Stop reason: HOSPADM

## 2021-01-06 RX ORDER — FENTANYL CITRATE 50 UG/ML
INJECTION, SOLUTION INTRAMUSCULAR; INTRAVENOUS AS NEEDED
Status: DISCONTINUED | OUTPATIENT
Start: 2021-01-06 | End: 2021-01-06 | Stop reason: HOSPADM

## 2021-01-06 RX ADMIN — CEFAZOLIN SODIUM 2 G: 1 POWDER, FOR SOLUTION INTRAMUSCULAR; INTRAVENOUS at 10:18

## 2021-01-06 RX ADMIN — FENTANYL CITRATE 50 MCG: 0.05 INJECTION, SOLUTION INTRAMUSCULAR; INTRAVENOUS at 10:36

## 2021-01-06 RX ADMIN — SODIUM CHLORIDE, POTASSIUM CHLORIDE, SODIUM LACTATE AND CALCIUM CHLORIDE 125 ML/HR: 600; 310; 30; 20 INJECTION, SOLUTION INTRAVENOUS at 09:22

## 2021-01-06 RX ADMIN — GLYCOPYRROLATE 0.4 MG: 0.2 INJECTION INTRAMUSCULAR; INTRAVENOUS at 11:22

## 2021-01-06 RX ADMIN — SODIUM CHLORIDE, POTASSIUM CHLORIDE, SODIUM LACTATE AND CALCIUM CHLORIDE 125 ML/HR: 600; 310; 30; 20 INJECTION, SOLUTION INTRAVENOUS at 09:35

## 2021-01-06 RX ADMIN — Medication 3 MG: at 11:22

## 2021-01-06 RX ADMIN — DEXAMETHASONE SODIUM PHOSPHATE 8 MG: 4 INJECTION, SOLUTION INTRAMUSCULAR; INTRAVENOUS at 10:18

## 2021-01-06 RX ADMIN — PROPOFOL 200 MG: 10 INJECTION, EMULSION INTRAVENOUS at 10:12

## 2021-01-06 RX ADMIN — KETOROLAC TROMETHAMINE 30 MG: 30 INJECTION INTRAMUSCULAR; INTRAVENOUS at 11:20

## 2021-01-06 RX ADMIN — MIDAZOLAM HYDROCHLORIDE 3 MG: 2 INJECTION, SOLUTION INTRAMUSCULAR; INTRAVENOUS at 10:05

## 2021-01-06 RX ADMIN — MIDAZOLAM HYDROCHLORIDE 2 MG: 2 INJECTION, SOLUTION INTRAMUSCULAR; INTRAVENOUS at 10:12

## 2021-01-06 RX ADMIN — ONDANSETRON HYDROCHLORIDE 4 MG: 2 SOLUTION INTRAMUSCULAR; INTRAVENOUS at 11:20

## 2021-01-06 RX ADMIN — FENTANYL CITRATE 50 MCG: 0.05 INJECTION, SOLUTION INTRAMUSCULAR; INTRAVENOUS at 11:25

## 2021-01-06 RX ADMIN — ROCURONIUM BROMIDE 50 MG: 10 INJECTION INTRAVENOUS at 10:13

## 2021-01-06 RX ADMIN — HYDROMORPHONE HYDROCHLORIDE 0.5 MG: 1 INJECTION, SOLUTION INTRAMUSCULAR; INTRAVENOUS; SUBCUTANEOUS at 12:41

## 2021-01-06 RX ADMIN — FENTANYL CITRATE 50 MCG: 0.05 INJECTION, SOLUTION INTRAMUSCULAR; INTRAVENOUS at 10:58

## 2021-01-06 RX ADMIN — LIDOCAINE HYDROCHLORIDE 100 MG: 20 INJECTION, SOLUTION EPIDURAL; INFILTRATION; INTRACAUDAL; PERINEURAL at 10:11

## 2021-01-06 RX ADMIN — FENTANYL CITRATE 100 MCG: 0.05 INJECTION, SOLUTION INTRAMUSCULAR; INTRAVENOUS at 10:11

## 2021-01-06 RX ADMIN — FENTANYL CITRATE 50 MCG: 0.05 INJECTION, SOLUTION INTRAMUSCULAR; INTRAVENOUS at 11:36

## 2021-01-06 NOTE — ANESTHESIA POSTPROCEDURE EVALUATION
Procedure(s):  ROBOTIC ASSISTED LAPAROSCOPIC SURPAUMBILICAL HERNIORRHAPHY WITH MESH. general    Anesthesia Post Evaluation      Multimodal analgesia: multimodal analgesia not used between 6 hours prior to anesthesia start to PACU discharge  Patient location during evaluation: PACU  Patient participation: complete - patient participated  Level of consciousness: awake  Pain management: adequate  Airway patency: patent  Anesthetic complications: no  Cardiovascular status: acceptable, blood pressure returned to baseline and hemodynamically stable  Respiratory status: acceptable  Hydration status: acceptable  Post anesthesia nausea and vomiting:  controlled      INITIAL Post-op Vital signs:   Vitals Value Taken Time   /69 01/06/21 1310   Temp 36.8 °C (98.3 °F) 01/06/21 1149   Pulse 96 01/06/21 1316   Resp 11 01/06/21 1316   SpO2 92 % 01/06/21 1316   Vitals shown include unvalidated device data.

## 2021-01-06 NOTE — PERIOP NOTES
Pt escorted off the unit with a w/c and in NAD. Home with spouse who is driving. Pt's spouse signed hard copy discharge form due to COVID-19 precautions.

## 2021-01-06 NOTE — ANESTHESIA PREPROCEDURE EVALUATION
Relevant Problems   No relevant active problems       Anesthetic History   No history of anesthetic complications            Review of Systems / Medical History  Patient summary reviewed, nursing notes reviewed and pertinent labs reviewed    Pulmonary  Within defined limits                 Neuro/Psych   Within defined limits           Cardiovascular  Within defined limits                     GI/Hepatic/Renal  Within defined limits              Endo/Other  Within defined limits      Morbid obesity and arthritis     Other Findings              Physical Exam    Airway  Mallampati: II  TM Distance: 4 - 6 cm  Neck ROM: normal range of motion   Mouth opening: Normal     Cardiovascular    Rhythm: regular  Rate: normal         Dental  No notable dental hx       Pulmonary  Breath sounds clear to auscultation               Abdominal         Other Findings            Anesthetic Plan    ASA: 2  Anesthesia type: general            Anesthetic plan and risks discussed with: Patient

## 2021-01-06 NOTE — BRIEF OP NOTE
Brief Postoperative Note    Patient: Calvin Hopson  YOB: 1980  MRN: [id number]    Date of Procedure: 1/6/2021     Pre-Op Diagnosis: INCARCERATED SUPRAUMBILICAL HERNIA    Post-Op Diagnosis: Same as preoperative diagnosis. Procedure(s):  ROBOTIC ASSISTED LAPAROSCOPIC REPAIR OF INCARCERATED SUPRAUMBILICAL HERNIA WITH 9 CM CIRCULAR SYMBOTEX MESH    Surgeon(s):  Sima Canas MD    Surgical Assistant: Surg Asst-1: Lea POLLACK    Anesthesia:   1. General endotracheal.  2. 0.5% Marcaine. 3. Exparel. Estimated Blood Loss (mL): Less than 25 ml. Complications: None    Specimens: * No specimens in log *     Implants:   Implant Name Type Inv. Item Serial No.  Lot No. LRB No. Used Action   MESH HARISH COMP RND 9CM -- SYMBOTEX - SNA  MESH HARISH COMP RND 9CM -- SYMBOTEX NA [facility] ENDOMECHANICAL [id number] N/A 1 Implanted       Drains: * No LDAs found *    Findings: An approximately 1.5 x 1 cm supraumbilical fascial defect with incarcerated preperitoneal fat.     Electronically Signed by Crystal De Guzman MD on 1/6/2021 at 11:29 AM

## 2021-01-06 NOTE — INTERVAL H&P NOTE
Update History & Physical 
 
The Patient's History and Physical of December 16, The plan was reviewed with the patient and I examined the patient. There was no change. The surgical site was confirmed by the patient and me. Plan:  The risk, benefits, expected outcome, and alternative to the recommended procedure have been discussed with the patient. Patient understands and wants to proceed with the procedure.  
 
Electronically signed by Iliana Menendez MD on 1/6/2021 at 9:43 AM

## 2021-01-06 NOTE — DISCHARGE INSTRUCTIONS
Patient Education        Abdominal Hernia Repair: What to Expect at Home  Your Recovery  After surgery to repair your hernia, you are likely to have pain for a few days. You may also feel like you have the flu, and you may have a low fever and feel tired and sick to your stomach. This is common. You should feel better after a few days and will probably feel much better in 7 days. For several weeks you may feel twinges or pulling in the hernia repair when you move. You may have some bruising around the area of the repair. This is normal.  This care sheet gives you a general idea about how long it will take for you to recover, but each person recovers at a different pace. Follow the steps below to get better as quickly as possible. How can you care for yourself at home? Activity    · Rest when you feel tired. Getting enough sleep will help you recover.     · Try to walk each day. Start by walking a little more than you did the day before. Bit by bit, increase the amount you walk. Walking boosts blood flow and helps prevent pneumonia and constipation.     · If your doctor gives you an abdominal binder to wear, use it as directed. This is an elastic bandage that wraps around your belly and upper hips. It helps support your belly muscles after surgery.     · Avoid strenuous activities, such as biking, jogging, weight lifting, or aerobic exercise, until your doctor says it is okay.     · Avoid lifting anything over 30 pounds or that would make you strain for 6 weeks!   This may include heavy grocery bags and milk containers, a heavy briefcase or backpack, cat litter or dog food bags, a vacuum , or a child.     · You may drive after 3 days and when no longer taking narcotic pain medication!     · Most people are able to return to work within 1 to 2 weeks after surgery, but if your job requires that you do heavy lifting or strenuous activity, you may need to take 4 to 6 weeks off from work.     · Wesley Gonzales may shower 24 hours after surgery. Pat the cuts (incisions) dry. Do not take a bath for the first 2 weeks, and until after seen by your doctor.     ·    Diet    · You can eat your normal diet. If your stomach is upset, try bland, low-fat foods like plain rice, broiled chicken, toast, and yogurt.     · Drink plenty of fluids (unless your doctor tells you not to).     · You may notice that your bowel movements are not regular right after your surgery. This is common. Avoid constipation and straining with bowel movements. You may want to take a fiber supplement every day. If you have not had a bowel movement by Thursday, 1/7, then take Miralax, Milk of Magnesia, prune juice, or other laxative until your bowel movements are normal!!! Medicines    · You can restart your medicines. Your doctor will also give you instructions about taking any new medicines.     ·      · Be safe with medicines. Take pain medicines exactly as directed. ? If the doctor gave you a prescription medicine for pain, take it as prescribed. ? If you are not taking a prescription pain medicine, ask your doctor if you can take an over-the-counter medicine.     · If your doctor prescribed antibiotics, take them as directed. Do not stop taking them just because you feel better. You need to take the full course of antibiotics.     · If you think your pain medicine is making you sick to your stomach:  ? Take your medicine after meals (unless your doctor has told you not to). ? Ask your doctor for a different pain medicine. Incision care    · Remove your bandages on Friday, 1/8. You may leave your incisions uncovered. · You have strips of tape on the cuts (incisions) the doctor made, leave the tape on for a week and then remove them on Wednesday, 1/13!     · Keep the incisions clean and dry.     · Wash the area daily with warm, soapy water, and pat it dry. Don't use hydrogen peroxide or alcohol, which can slow healing.  You may cover the incisions with a gauze bandage if they weep or rub against clothing. Change the bandage every day. Other instructions    · Hold a pillow over your incision when you cough or take deep breaths. This will support your belly and decrease your pain.     · Do breathing exercises at home as instructed by your doctor. This will help prevent pneumonia.     · If you had laparoscopic surgery, you may also have pain in your left shoulder. The pain usually lasts about a day or two. Follow-up care is a key part of your treatment and safety. Be sure to make and go to all appointments, and call your doctor if you are having problems. It's also a good idea to know your test results and keep a list of the medicines you take. When should you call for help? Call 911 anytime you think you may need emergency care. For example, call if:    · You passed out (lost consciousness).     · You are short of breath. Call your doctor now or seek immediate medical care if:    · You are sick to your stomach and cannot drink fluids.     · You have signs of a blood clot in your leg (called a deep vein thrombosis), such as:  ? Pain in your calf, back of the knee, thigh, or groin. ? Redness and swelling in your leg or groin.     · You have signs of infection, such as:  ? Increased pain, swelling, warmth, or redness. ? Red streaks leading from the incision. ? Pus draining from the incision. ? A fever >101.     · You cannot pass stool or gas.     · You have abdominal pain that does not get better after you take pain medicine.     · You have loose stitches, or your incision comes open.     · Bright red blood has soaked through the bandage over your incision. Watch closely for changes in your health, and be sure to contact your doctor if you have any problems. Where can you learn more?   Go to http://www.gray.com/  Enter B577 in the search box to learn more about \"Abdominal Hernia Repair: What to Expect at Home. \"  Current as of: April 15, 2020               Content Version: 12.6  © 6100-3299 [employer]. Care instructions adapted under license by [employer] (which disclaims liability or warranty for this information). If you have questions about a medical condition or this instruction, always ask your healthcare professional. [place] [address] any warranty or liability for your use of this information. David Esquivel. David Clemente MD, [address] [place] [address], [facility] at [address] [facility], [address] , [facility] [address]  [place] [address]  [phone]  Fax [phone]    DISCHARGE SUMMARY from your Nurse      PATIENT INSTRUCTIONS    After general anesthesia or intravenous sedation, for 24 hours or while taking prescription Narcotics:  · Limit your activities  · Do not drive and operate hazardous machinery  · Do not make important personal or business decisions  · Do  not drink alcoholic beverages  · If you have not urinated within 8 hours after discharge, please contact your surgeon on call. Report the following to your surgeon:  · Excessive pain, swelling, redness or odor of or around the surgical area  · Temperature over 100.5  · Nausea and vomiting lasting longer than 4 hours or if unable to take medications  · Any signs of decreased circulation or nerve impairment to extremity: change in color, persistent  numbness, tingling, coldness or increase pain  · Any questions      GOOD HELP TO FIGHT AN INFECTION  Here are a few tip to help reduce the chance of getting an infection after surgery:   Wash Your Hands   Good handwashing is the most important thing you and your caregiver can do.  Wash before and after caring for any wounds. Dry your hand with a clean towel.  Wash with soap and water for at least 20 seconds. A TIP: sing the \"Happy Birthday\" song through one time while washing to help with the timing.    Use a hand  in between washings.  Shower   When your surgeon says it is OK to take a shower, use a new bar of antibacterial soap (if that is what you use, and keep that bar of soap ONLY for your use), or antibacterial body wash.  Use a clean wash cloth or sponge when you bathe.  Dry off with a clean towel  after every bath - be careful around any wounds, skin staples, sutures or surgical glue over/on wounds.  Do not enter swimming pools, hot tubs, lakes, rivers and/or ocean until wounds are healed and your doctor/surgeon says it is OK.  Use Clean Sheets   Sleep on freshly laundered sheets after your surgery.  Keep the surgery site covered with a clean, dry bandage (if instructed to do so). If the bandage becomes soiled, reapply a new, dry, clean bandage.  Do not allow pets to sleep with you while your wound is healing.  Lifestyle Modification and Controlling Your Blood Sugar   Smoking slows wound healing. Stop smoking and limit exposure to second-hand smoke.  High blood sugar slows wound healing. Eat a well-balanced diet to provide proper nutrition while healing   Monitor your blood sugar (if you are a diabetic) and take your medications as you are suppose to so you can control you blood sugar after surgery. COUGH AND DEEP BREATHE    Breathing deeply and coughing are very important exercises to do after surgery. Deep breathing and coughing open the little air tubes and air sacks in your lungs. You take deep breaths every day. You may not even notice - it is just something you do when you sigh or yawn. It is a natural exercise you do to keep these air passages open. After surgery, take deep breaths and cough, on purpose. DIRECTIONS:  · Take 10 to 15 slow deep breaths every hour while awake. · Breathe in deeply, and hold it for 2 seconds. · Exhale slowly through puckered lips, like blowing up a balloon.   · After every 4th or 5th deep breath, hug your pillow to your chest or belly and give a hard, deep cough.      Yes, it will probably hurt.  But doing this exercise is a very important part of healing after surgery.  Take your pain medicine to help you do this exercise without too much pain.    Coughing and deep breathing help prevent bronchitis and pneumonia after surgery.  If you had chest or belly surgery, use a pillow as a \"hug germán\" and hold it tightly to your chest or belly when you cough.       ANKLE PUMPS    Ankle pumps increase the circulation of oxygenated blood to your lower extremities and decrease your risk for circulation problems such as blood clots. They also stretch the muscles, tendons and ligaments in your foot and ankle, and prevent joint contracture in the ankle and foot, especially after surgeries on the legs.    It is important to do ankle pump exercises regularly after surgery because immobility increases your risk for developing a blood clot.  Your doctor may also have you take an Aspirin for the next few days as well.      If your doctor did not ask you to take an Aspirin, consult with him before starting Aspirin therapy on your own.    The exercise is quite simple.     · Slowly point your foot forward, feeling the muscles on the top of your lower leg stretch, and hold this position for 5 seconds.                  · Next, pull your foot back toward you as far as possible, stretching the calf muscles, and hold that position for 5 seconds.                 · Repeat with the other foot.  · Perform 10 repetitions every hour while awake for both ankles if possible (down and then up with the foot once is one repetition).    You should feel gentle stretching of the muscles in your lower leg when doing this exercise.  If you feel pain, or your range of motion is limited, don't push too hard.  Only go the limit your joint and muscles will let you go.  If you have increasing pain, progressively worsening leg warmth or swelling, STOP the exercise and call your doctor.  MEDICATION AND   SIDE EFFECT GUIDE    The [facility] Insurance MEDICATION AND SIDE EFFECT GUIDE was provided to the PATIENT AND CARE PROVIDER. Information provided includes instruction about drug purpose and common side effects for the following medications:   · [address]        These are general instructions for a healthy lifestyle:    *   Please give a list of your current medications to your Primary Care Provider. *   Please update this list whenever your medications are discontinued, doses are changed, or new medications (including over-the-counter products) are added. *   Please carry medication information at all times in case of emergency situations. About Smoking  No smoking / No tobacco products  Avoid exposure to second hand smoke     Surgeon General's Warning:  Quitting smoking now greatly reduces serious risk to your health. Obesity, smoking, and sedentary lifestyle greatly increases your risk for illness and disease. A healthy diet, regular physical exercise & weight monitoring are important for maintaining a healthy lifestyle. Congestive Heart Failure  You may be retaining fluid if you have a history of heart failure or if you experience any of the following symptoms:  Weight gain of 3 pounds or more overnight or 5 pounds in a week, increased swelling in your hands or feet or shortness of breath while lying flat in bed. Please call your doctor as soon as you notice any of these symptoms; do not wait until your next office visit. Recognize signs and symptoms of STROKE:  F -  Face looks uneven  A -  Arms unable to move or move evenly  S -  Speech slurred or non-existent  T -  Time-call 911 as soon as signs and symptoms begin-DO NOT go          back to bed or wait to see if you get better-TIME IS BRAIN. Warning Signs of HEART ATTACK   Call 911 if you have these symptoms:     Chest discomfort.  Most heart attacks involve discomfort in the center of the chest that lasts more than a few minutes, or that goes away and comes back. It can feel like uncomfortable pressure, squeezing, fullness, or pain.  Discomfort in other areas of the upper body. Symptoms can include pain or discomfort in one or both arms, the back, neck, jaw, or stomach.  Shortness of breath with or without chest discomfort.  Other signs may include breaking out in a cold sweat, nausea, or lightheadedness. Don't wait more than five minutes to call 911 - MINUTES MATTER! Fast action can save your life. Calling 911 is almost always the fastest way to get lifesaving treatment. Emergency Medical Services staff can begin treatment when they arrive -- up to an hour sooner than if someone gets to the hospital by car. Learning About Coronavirus [phone])  Coronavirus [phone]): Overview  What is coronavirus (COVID-19)? The coronavirus disease (COVID-19) is caused by a virus. It is an illness that was first found in [place], [place], in December 2019. It has since spread worldwide. The virus can cause fever, cough, and trouble breathing. In severe cases, it can cause pneumonia and make it hard to breathe without help. It can cause death. Coronaviruses are a large group of viruses. They cause the common cold. They also cause more serious illnesses like Middle East respiratory syndrome (MERS) and severe acute respiratory syndrome (SARS). COVID-19 is caused by a novel coronavirus. That means it's a new type that has not been seen in people before. This virus spreads person-to-person through droplets from coughing and sneezing. It can also spread when you are close to someone who is infected. And it can spread when you touch something that has the virus on it, such as a doorknob or a tabletop. What can you do to protect yourself from coronavirus (COVID-19)? The best way to protect yourself from getting sick is to:  · Avoid areas where there is an outbreak. · Avoid contact with people who may be infected.   · Wash your hands often with soap or alcohol-based hand sanitizers. · Avoid crowds and try to stay at least 6 feet away from other people. · Wash your hands often, especially after you cough or sneeze. Use soap and water, and scrub for at least 20 seconds. If soap and water aren't available, use an alcohol-based hand . · Avoid touching your mouth, nose, and eyes. What can you do to avoid spreading the virus to others? To help avoid spreading the virus to others:  · Cover your mouth with a tissue when you cough or sneeze. Then throw the tissue in the trash. · Use a disinfectant to clean things that you touch often. · Stay home if you are sick or have been exposed to the virus. Don't go to school, work, or public areas. And don't use public transportation. · If you are sick:  ? Leave your home only if you need to get medical care. But call the doctor's office first so they know you're coming. And wear a face mask, if you have one.  ? If you have a face mask, wear it whenever you're around other people. It can help stop the spread of the virus when you cough or sneeze. ? Clean and disinfect your home every day. Use household  and disinfectant wipes or sprays. Take special care to clean things that you grab with your hands. These include doorknobs, remote controls, phones, and handles on your refrigerator and microwave. And don't forget countertops, tabletops, bathrooms, and computer keyboards. When to call for help  Call 911 anytime you think you may need emergency care. For example, call if:  · You have severe trouble breathing. (You can't talk at all.)  · You have constant chest pain or pressure. · You are severely dizzy or lightheaded. · You are confused or can't think clearly. · Your face and lips have a blue color. · You pass out (lose consciousness) or are very hard to wake up. Call your doctor now if you develop symptoms such as:  · Shortness of breath. · Fever. · Cough.   If you need to get care, call ahead to the doctor's office for instructions before you go. Make sure you wear a face mask, if you have one, to prevent exposing other people to the virus. Where can you get the latest information? The following health organizations are tracking and studying this virus. Their websites contain the most up-to-date information. Marlene Jauregui also learn what to do if you think you may have been exposed to the virus. · U.S. Centers for Disease Control and Prevention (CDC): The CDC provides updated news about the disease and travel advice. The website also tells you how to prevent the spread of infection. www.cdc.gov  · World Health Organization [facility]): WHO offers information about the virus outbreaks. WHO also has travel advice. www.who.int  Current as of: April 1, 2020               Content Version: 12.4  © 2006-2020 [employer]. Care instructions adapted under license by your healthcare professional. If you have questions about a medical condition or this instruction, always ask your healthcare professional. [place] [address] any warranty or liability for your use of this information. The discharge information has been reviewed with the patient and spouse. Any questions and concerns from the patient and spouse have been addressed. The patient and spouse verbalized understanding. The following personal items collected during your admission are returned to you:   Dental Appliance: Dental Appliances: None  Vision: Visual Aid: Glasses  Hearing Aid:    Jewelry: Jewelry: None  Clothing: Clothing: (Street clothing to locker)  Other Valuables:  Other Valuables: Cell Phone(Pt wishes for phone to remain with belongs)  Valuables sent to safe:  n/a

## 2021-01-07 ENCOUNTER — TELEPHONE (OUTPATIENT)
Dept: SURGERY | Age: 41
End: 2021-01-07

## 2021-01-07 NOTE — TELEPHONE ENCOUNTER
How are you doing:Good    Are you having any pain: Yes X______           No ______  If yes level:5/10     Are you taking pain meds:Yes - hydrocodone 5-325 mg tab 1 every 4 hrs as needed       If yes- recommended any OTC constipation treatment if needed    Have you had any nausea or vomiting: Yes _______           NoX _______    How is your appetite (eating & drinking):Good    Have you moved your bowels since surgery: Yes ______       No __X____ advised to take miralex or senna tab if no BM     Any issues with urination: Yes _____        No X______    Pt notified to take dressing off after 48 hrs: Yes _X______        No ______    Do they have a drain:  Yes ______        No _X_____    Are they keeping track of output: Yes ______        No ______    Did they review their discharge instructions:   Yes X______         No ______    Any other concerns:None    Your follow up office appt is:1/20/21 at 1:15PM

## 2021-01-07 NOTE — OP NOTES
[facility] 80  OPERATIVE REPORT    Name:  Farzaneh Bolanos  MR#:  [id number]  :  1980  ACCOUNT #:  [de-identified]  DATE OF SERVICE:  2021    SURGEON:    Renata Flowers MD.    ASSISTANT:    Dara Gaxiloa. ANESTHESIA:  1. General endotracheal.  2.  0.5% Marcaine. 3.  Exparel. PREOPERATIVE DIAGNOSIS:    Incarcerated supraumbilical hernia. POSTOPERATIVE DIAGNOSIS:   Incarcerated supraumbilical hernia. PROCEDURE:    Robotic-assisted laparoscopic repair of incarcerated supraumbilical hernia with 9 cm circular Symbotex mesh. INDICATION:    Mr. Analilia Wing is a 45year-old white gentleman who was seen in the office recently for evaluation of a hernia above his umbilicus which has been present for the past nine months. He first noticed it after lifting weeds in his yard. The hernia is now causing him discomfort. Clinically, he has an incarcerated supraumbilical hernia. He presents at this time for his elective repair. PROCEDURE:    The patient was seen preoperatively in the holding area. The risks, benefits, expected outcome were discussed with the patient, and all questions were answered satisfactorily. The patient concurred with the proposed plan, giving informed consent. The patient was shaved in the holding area. The patient was taken to the OR. The patient was identified as Leslie Wing, and the procedure was verified as Robotic-assisted laparoscopic supraumbilical herniorrhaphy with mesh, possible open. The patient was placed on the OR table in the supine position. Prior to induction, antibiotic prophylaxis was administered. General anesthesia was administered and tolerated well. Both arms were tucked, and the left side was propped up. The bed was david-knifed. The patient's abdomen was prepped with ChloraPrep and draped in the usual sterile fashion with Shante Folds placed over the skin.   A time-out was performed, and the above information was confirmed. The local anesthetic was injected into the skin and subcutaneous tissue in the left mid abdomen. Using a #15 blade, an incision was made. Towel clips were used to elevate the patient's abdominal wall. Using the Optiview technique, a 5 mm trocar was introduced into the abdomen. Insufflation was provided through this trocar to establish a pneumoperitoneum of 15 mmHg, which the patient tolerated. The abdominal cavity was examined, and there were no adhesions noted to prevent a laparoscopic approach. The hernia was identified at the anticipated site. The local anesthetic was injected at the remaining intended trocar sites. Two 8 mm trocars were placed in the left upper quadrant and left lower quadrant, respectively, under direct laparoscopic vision. The 5 mm trocar was up-sized to an 8 mm trocar. The robotic arms were docked. At this time, I scrubbed out and went to the surgeon's console. I took control of the robotic arms for the majority of the procedure. Attention was turned to the supraumbilical region. Preperitoneal fat was cleared from the abdominal wall and, in doing this, was also reduced from the hernia sac. There was a single supraumbilical fascial defect which measured approximately 1.5 x 1 cm with the longest dimension in the transverse direction. The pneumoperitoneum was taken down to 10 mmHg. The fascial defect was closed with running 2-0 Stratafix in the transverse direction. The needle was removed. The pneumoperitoneum was taken back up to 15 mmHg. A 9 cm circular Symbotex mesh was chosen for the repair. The mesh was rolled up and placed into the abdomen. The mesh was unrolled and pulled up to the abdominal wall to center along the fascial repair with the rough side apposing the abdominal wall. The mesh was secured at the 6 and 12 o'clock positions with 2-0 Stratafix.   The ends of the mesh were sewn to the abdominal wall on each side with the respective sutures, pulling the mesh taut. All needles were removed. The underlying bowel was examined, and no injuries were noted. The robotic arms were undocked. At this time, I went back to the patient's bedside. Exparel was injected into the subcutaneous tissue circumferentially around the mesh and along the fascial repair. The left upper quadrant and left lower quadrant trocars were removed under direct laparoscopic vision, and there was no bleeding noted internally from either site. The laparoscope was removed, and the abdomen was decompressed. The left mid abdomen trocar was then removed. Hemostasis was obtained within all wounds as needed with cautery. The skin at all sites was closed with 4-0 Monocryl in the usual subcuticular fashion. The wounds were cleaned and dried, and Steri-Strips and bandages were applied. An abdominal binder was placed. The patient was extubated in the room. ESTIMATED BLOOD LOSS:    Less than 25 mL. SPECIMENS:    None. IMPLANTS:    A 9 cm circular Symbotex mesh was placed as an intraperitoneal onlay mesh directly below the supraumbilical fascial repair. FINDINGS:    An approximately 1.5 x 1 cm supraumbilical fascial defect with incarcerated preperitoneal fat. COUNTS:    All sponge, needle, and instrument counts were correct x 2. COMPLICATIONS:    None. DISPOSITION:    The patient was transferred to the recovery room in stable condition, having tolerated the procedure and anesthesia well.       Yury Nguyễn MD [id number]  D:  01/07/2021 12:29  T:  01/07/2021 14:58  JOB #:  [id number]  CC:  Fay Shah DO

## 2021-01-20 ENCOUNTER — OFFICE VISIT (OUTPATIENT)
Dept: SURGERY | Age: 41
End: 2021-01-20
Payer: COMMERCIAL

## 2021-01-20 VITALS
RESPIRATION RATE: 18 BRPM | TEMPERATURE: 98.6 F | WEIGHT: 237.5 LBS | DIASTOLIC BLOOD PRESSURE: 84 MMHG | HEART RATE: 71 BPM | BODY MASS INDEX: 38.17 KG/M2 | HEIGHT: 66 IN | OXYGEN SATURATION: 97 % | SYSTOLIC BLOOD PRESSURE: 131 MMHG

## 2021-01-20 DIAGNOSIS — Z87.19 S/P LAPAROSCOPIC HERNIA REPAIR: ICD-10-CM

## 2021-01-20 DIAGNOSIS — Z98.890 S/P LAPAROSCOPIC HERNIA REPAIR: ICD-10-CM

## 2021-01-20 PROCEDURE — 99024 POSTOP FOLLOW-UP VISIT: CPT | Performed by: SURGERY

## 2021-01-20 NOTE — PROGRESS NOTES
1. Have you been to the ER, urgent care clinic since your last visit? Hospitalized since your last visit? No    2. Have you seen or consulted any other health care providers outside of the [address] [place] [address] since your last visit? Include any pap smears or colon screening.  No

## 2021-01-27 ENCOUNTER — DOCUMENTATION ONLY (OUTPATIENT)
Dept: FAMILY MEDICINE CLINIC | Age: 41
End: 2021-01-27

## 2021-02-02 ENCOUNTER — VIRTUAL VISIT (OUTPATIENT)
Dept: FAMILY MEDICINE CLINIC | Age: 41
End: 2021-02-02
Payer: COMMERCIAL

## 2021-02-02 DIAGNOSIS — F41.9 ANXIETY: ICD-10-CM

## 2021-02-02 DIAGNOSIS — F33.9 RECURRENT DEPRESSION (HCC): Primary | ICD-10-CM

## 2021-02-02 PROCEDURE — 99213 OFFICE O/P EST LOW 20 MIN: CPT | Performed by: FAMILY MEDICINE

## 2021-02-02 RX ORDER — CITALOPRAM 10 MG/1
TABLET ORAL
Qty: 60 TAB | Refills: 1 | Status: SHIPPED | OUTPATIENT
Start: 2021-02-02 | End: 2021-02-05 | Stop reason: SDUPTHER

## 2021-02-02 RX ORDER — SERTRALINE HYDROCHLORIDE 50 MG/1
50 TABLET, FILM COATED ORAL DAILY
Qty: 30 TAB | Refills: 0 | Status: SHIPPED | OUTPATIENT
Start: 2021-02-02 | End: 2021-02-02

## 2021-02-02 RX ORDER — SERTRALINE HYDROCHLORIDE 50 MG/1
TABLET, FILM COATED ORAL
Qty: 90 TAB | Refills: 3 | Status: SHIPPED | OUTPATIENT
Start: 2021-02-02 | End: 2021-05-06

## 2021-02-02 NOTE — PROGRESS NOTES
Progress Note    he is a 45y.o. year old male who presents for evalution. Subjective:     Here for follow-up states he is taking the Zoloft 100 mg Wellbutrin 450 mg daily. States that he increase to the 100 mg he has had issues with anorgasmia and decreased libido. He states this is affecting his personal relationship with his wife. The Wellbutrin never had this effect on him and he is currently on max dose. Discussed switching to different medication such as Celexa and will increase that as we decrease the Zoloft. We did discuss that this is a class effect with the side effect and 1 person may react differently to one agent than the other. Patient is also interested in doing genetic testing to see which SSRI may work best for him. I did give him the website gene site and he will contact them. Discussed that I be willing to sign off on a prescription for this. He will look into this further with his insurance as well to see if they have a preferred company to work with. He also reports that the Zoloft has helped with anxiety but not so much with depression. States that the depression has crept back in and worsen due to pandemic. Reviewed PmHx, RxHx, FmHx, SocHx, AllgHx and updated and dated in the chart. Review of Systems - negative except as listed above in the HPI    Objective: There were no vitals filed for this visit. Current Outpatient Medications   Medication Sig    sertraline (ZOLOFT) 50 mg tablet Take 1 Tab by mouth daily. X 2 weeks then stop    citalopram (CELEXA) 10 mg tablet 1 tab Po qday x 2 weeks then increase to 2 tabs daily (20mg)    buPROPion XL (WELLBUTRIN XL) 150 mg tablet Take 1 Tab by mouth every morning.  buPROPion XL (WELLBUTRIN XL) 300 mg XL tablet TAKE 1 TABLET BY MOUTH EVERY MORNING    crisaborole (Eucrisa) 2 % oint 1 Dose by Apply Externally route as needed.     cyclobenzaprine (FLEXERIL) 10 mg tablet TAKE 1 TABLET BY MOUTH THREE TIMES A DAY AS NEEDED FOR MUSCLE SPASMS    triamcinolone acetonide (KENALOG) 0.1 % topical cream Apply  to affected area two (2) times a day. use thin layer     No current facility-administered medications for this visit. Physical Examination: General appearance - alert, well appearing, and in no distress  Mental status - alert, oriented to person, place, and time      Assessment/ Plan:   Diagnoses and all orders for this visit:    1. Recurrent depression ([address]  Taper off Zoloft while adding on Celexa. Follow-up 4 weeks. 2. Anxiety  -     sertraline (ZOLOFT) 50 mg tablet; Take 1 Tab by mouth daily. X 2 weeks then stop  -     citalopram (CELEXA) 10 mg tablet; 1 tab Po qday x 2 weeks then increase to 2 tabs daily (20mg)       Follow-up and Dispositions    · Return in about 4 weeks (around 3/2/2021), or if symptoms worsen or fail to improve. I have discussed the diagnosis with the patient and the intended plan as seen in the above orders. The patient has received an after-visit summary and questions were answered concerning future plans. Pt conveyed understanding of plan. Medication Side Effects and Warnings were discussed with patient      Queen Sanchez is being evaluated by a Virtual Visit (video visit) encounter to address concerns as mentioned above. A caregiver was present when appropriate. Due to this being a TeleHealth encounter (During [facility]-01 public health emergency), evaluation of the following organ systems was limited: Vitals/Constitutional/EENT/Resp/CV/GI//MS/Neuro/Skin/Heme-Lymph-Imm. Pursuant to the emergency declaration under the [facility], [address] [place] [address] authority and the [facility] and [employer] Act, this Virtual Visit was conducted with patient's (and/or legal guardian's) consent, to reduce the patient's risk of exposure to COVID-19 and provide necessary medical care.   The patient (and/or legal guardian) has also been advised to contact this office for worsening conditions or problems, and seek emergency medical treatment and/or call 911 if deemed necessary. Patient identification was verified at the start of the visit: Yes    Services were provided through a video synchronous discussion virtually to substitute for in-person clinic visit. Patient and provider were located at their individual homes.   --[address] [place] [address], DO on 2/2/2021 at 2:49 PM

## 2021-02-02 NOTE — PATIENT INSTRUCTIONS
A Healthy Lifestyle: Care Instructions Your Care Instructions A healthy lifestyle can help you feel good, stay at a healthy weight, and have plenty of energy for both work and play. A healthy lifestyle is something you can share with your whole family. A healthy lifestyle also can lower your risk for serious health problems, such as high blood pressure, heart disease, and diabetes. You can follow a few steps listed below to improve your health and the health of your family. Follow-up care is a key part of your treatment and safety. Be sure to make and go to all appointments, and call your doctor if you are having problems. It's also a good idea to know your test results and keep a list of the medicines you take. How can you care for yourself at home? · Do not eat too much sugar, fat, or fast foods. You can still have dessert and treats now and then. The goal is moderation. · Start small to improve your eating habits. Pay attention to portion sizes, drink less juice and soda pop, and eat more fruits and vegetables. ? Eat a healthy amount of food. A 3-ounce serving of meat, for example, is about the size of a deck of cards. Fill the rest of your plate with vegetables and whole grains. ? Limit the amount of soda and sports drinks you have every day. Drink more water when you are thirsty. ? Eat at least 5 servings of fruits and vegetables every day. It may seem like a lot, but it is not hard to reach this goal. A serving or helping is 1 piece of fruit, 1 cup of vegetables, or 2 cups of leafy, raw vegetables. Have an apple or some carrot sticks as an afternoon snack instead of a candy bar. Try to have fruits and/or vegetables at every meal. 
· Make exercise part of your daily routine. You may want to start with simple activities, such as walking, bicycling, or slow swimming. Try to be active 30 to 60 minutes every day. You do not need to do all 30 to 60 minutes all at once.  For example, you can exercise 3 times a day for 10 or 20 minutes. Moderate exercise is safe for most people, but it is always a good idea to talk to your doctor before starting an exercise program. 
· Keep moving. Collette Krishnamurthy the lawn, work in the garden, or [employer]. Take the stairs instead of the elevator at work. · If you smoke, quit. People who smoke have an increased risk for heart attack, stroke, cancer, and other lung illnesses. Quitting is hard, but there are ways to boost your chance of quitting tobacco for good. ? Use nicotine gum, patches, or lozenges. ? Ask your doctor about stop-smoking programs and medicines. ? Keep trying. In addition to reducing your risk of diseases in the future, you will notice some benefits soon after you stop using tobacco. If you have shortness of breath or asthma symptoms, they will likely get better within a few weeks after you quit. · Limit how much alcohol you drink. Moderate amounts of alcohol (up to 2 drinks a day for men, 1 drink a day for women) are okay. But drinking too much can lead to liver problems, high blood pressure, and other health problems. Family health If you have a family, there are many things you can do together to improve your health. · Eat meals together as a family as often as possible. · Eat healthy foods. This includes fruits, vegetables, lean meats and dairy, and whole grains. · Include your family in your fitness plan. Most people think of activities such as jogging or tennis as the way to fitness, but there are many ways you and your family can be more active. Anything that makes you breathe hard and gets your heart pumping is exercise. Here are some tips: 
? Walk to do errands or to take your child to school or the bus. 
? Go for a family bike ride after dinner instead of watching TV. Where can you learn more? Go to http://www.gray.com/ Enter N672 in the search box to learn more about \"A Healthy Lifestyle: Care Instructions. \" Current as of: January 31, 2020               Content Version: 12.6 © 5990-8657 [employer]. Care instructions adapted under license by [employer] (which disclaims liability or warranty for this information). If you have questions about a medical condition or this instruction, always ask your healthcare professional. [address] any warranty or liability for your use of this information.

## 2021-02-05 DIAGNOSIS — F41.9 ANXIETY: ICD-10-CM

## 2021-02-05 RX ORDER — CITALOPRAM 20 MG/1
20 TABLET, FILM COATED ORAL DAILY
Qty: 30 TAB | Refills: 1 | Status: SHIPPED | OUTPATIENT
Start: 2021-02-05 | End: 2021-03-01 | Stop reason: SDUPTHER

## 2021-02-05 RX ORDER — CITALOPRAM 10 MG/1
TABLET ORAL
Qty: 30 TAB | Refills: 0 | Status: SHIPPED | OUTPATIENT
Start: 2021-02-05 | End: 2021-05-06 | Stop reason: ALTCHOICE

## 2021-03-01 DIAGNOSIS — F41.9 ANXIETY: ICD-10-CM

## 2021-03-02 RX ORDER — CITALOPRAM 20 MG/1
20 TABLET, FILM COATED ORAL DAILY
Qty: 30 TAB | Refills: 1 | Status: SHIPPED | OUTPATIENT
Start: 2021-03-02 | End: 2021-03-29 | Stop reason: SDUPTHER

## 2021-03-08 DIAGNOSIS — M54.42 CHRONIC MIDLINE LOW BACK PAIN WITH LEFT-SIDED SCIATICA: ICD-10-CM

## 2021-03-08 DIAGNOSIS — G89.29 CHRONIC MIDLINE LOW BACK PAIN WITH LEFT-SIDED SCIATICA: ICD-10-CM

## 2021-03-09 RX ORDER — CYCLOBENZAPRINE HCL 10 MG
TABLET ORAL
Qty: 60 TAB | Refills: 2 | Status: SHIPPED | OUTPATIENT
Start: 2021-03-09 | End: 2021-05-18

## 2021-03-29 DIAGNOSIS — F41.9 ANXIETY: ICD-10-CM

## 2021-03-30 RX ORDER — CITALOPRAM 20 MG/1
20 TABLET, FILM COATED ORAL DAILY
Qty: 90 TAB | Refills: 1 | Status: SHIPPED | OUTPATIENT
Start: 2021-03-30 | End: 2022-09-27

## 2021-05-06 ENCOUNTER — OFFICE VISIT (OUTPATIENT)
Dept: FAMILY MEDICINE CLINIC | Age: 41
End: 2021-05-06
Payer: COMMERCIAL

## 2021-05-06 VITALS
BODY MASS INDEX: 38.57 KG/M2 | OXYGEN SATURATION: 98 % | HEART RATE: 95 BPM | HEIGHT: 66 IN | WEIGHT: 240 LBS | DIASTOLIC BLOOD PRESSURE: 94 MMHG | RESPIRATION RATE: 18 BRPM | TEMPERATURE: 98.9 F | SYSTOLIC BLOOD PRESSURE: 146 MMHG

## 2021-05-06 DIAGNOSIS — L30.9 ECZEMA, UNSPECIFIED TYPE: ICD-10-CM

## 2021-05-06 DIAGNOSIS — M25.50 ARTHRALGIA, UNSPECIFIED JOINT: Primary | ICD-10-CM

## 2021-05-06 DIAGNOSIS — Z11.59 ENCOUNTER FOR HEPATITIS C SCREENING TEST FOR LOW RISK PATIENT: ICD-10-CM

## 2021-05-06 DIAGNOSIS — E66.01 OBESITY, MORBID (HCC): ICD-10-CM

## 2021-05-06 DIAGNOSIS — M79.641 RIGHT HAND PAIN: ICD-10-CM

## 2021-05-06 DIAGNOSIS — M25.60 MORNING STIFFNESS OF JOINTS: ICD-10-CM

## 2021-05-06 PROCEDURE — 99214 OFFICE O/P EST MOD 30 MIN: CPT | Performed by: FAMILY MEDICINE

## 2021-05-06 RX ORDER — CRISABOROLE 20 MG/G
1 OINTMENT TOPICAL
Qty: 60 G | Refills: 5 | Status: SHIPPED | OUTPATIENT
Start: 2021-05-06 | End: 2021-10-01 | Stop reason: SDUPTHER

## 2021-05-06 NOTE — PATIENT INSTRUCTIONS
A Healthy Lifestyle: Care Instructions Your Care Instructions A healthy lifestyle can help you feel good, stay at a healthy weight, and have plenty of energy for both work and play. A healthy lifestyle is something you can share with your whole family. A healthy lifestyle also can lower your risk for serious health problems, such as high blood pressure, heart disease, and diabetes. You can follow a few steps listed below to improve your health and the health of your family. Follow-up care is a key part of your treatment and safety. Be sure to make and go to all appointments, and call your doctor if you are having problems. It's also a good idea to know your test results and keep a list of the medicines you take. How can you care for yourself at home? · Do not eat too much sugar, fat, or fast foods. You can still have dessert and treats now and then. The goal is moderation. · Start small to improve your eating habits. Pay attention to portion sizes, drink less juice and soda pop, and eat more fruits and vegetables. ? Eat a healthy amount of food. A 3-ounce serving of meat, for example, is about the size of a deck of cards. Fill the rest of your plate with vegetables and whole grains. ? Limit the amount of soda and sports drinks you have every day. Drink more water when you are thirsty. ? Eat plenty of fruits and vegetables every day. Have an apple or some carrot sticks as an afternoon snack instead of a candy bar. Try to have fruits and/or vegetables at every meal. 
· Make exercise part of your daily routine. You may want to start with simple activities, such as walking, bicycling, or slow swimming. Try to be active 30 to 60 minutes every day. You do not need to do all 30 to 60 minutes all at once. For example, you can exercise 3 times a day for 10 or 20 minutes.  Moderate exercise is safe for most people, but it is always a good idea to talk to your doctor before starting an exercise program. 
· Keep moving. Meggan Benton the lawn, work in the garden, or [employer]. Take the stairs instead of the elevator at work. · If you smoke, quit. People who smoke have an increased risk for heart attack, stroke, cancer, and other lung illnesses. Quitting is hard, but there are ways to boost your chance of quitting tobacco for good. ? Use nicotine gum, patches, or lozenges. ? Ask your doctor about stop-smoking programs and medicines. ? Keep trying. In addition to reducing your risk of diseases in the future, you will notice some benefits soon after you stop using tobacco. If you have shortness of breath or asthma symptoms, they will likely get better within a few weeks after you quit. · Limit how much alcohol you drink. Moderate amounts of alcohol (up to 2 drinks a day for men, 1 drink a day for women) are okay. But drinking too much can lead to liver problems, high blood pressure, and other health problems. Family health If you have a family, there are many things you can do together to improve your health. · Eat meals together as a family as often as possible. · Eat healthy foods. This includes fruits, vegetables, lean meats and dairy, and whole grains. · Include your family in your fitness plan. Most people think of activities such as jogging or tennis as the way to fitness, but there are many ways you and your family can be more active. Anything that makes you breathe hard and gets your heart pumping is exercise. Here are some tips: 
? Walk to do errands or to take your child to school or the bus. 
? Go for a family bike ride after dinner instead of watching TV. Where can you learn more? Go to http://www.gray.com/ Enter Y365 in the search box to learn more about \"A Healthy Lifestyle: Care Instructions. \" Current as of: September 23, 2020               Content Version: 12.8 © 9415-8354 [employer].   
Care instructions adapted under license by [employer] (which disclaims liability or warranty for this information). If you have questions about a medical condition or this instruction, always ask your healthcare professional. [address] any warranty or liability for your use of this information.

## 2021-05-06 NOTE — PROGRESS NOTES
Progress Note    he is a 45y.o. year old male who presents for evalution. Subjective:     States has been having quite a bit of pain in hips hands and knees. Went to play guitar and could not play for more than 20 min before his hands were on fire. Usually would take couple hrs for that. Swelling in hands in AM, very stiff in the morning. Takes about 10 min to get out of bed and that is waking up waiting then stretching etc.  Feels like he needs to crack his knuckles constantly, states they feel full/too big and when he pops them can move better for a few minutes. States joint pains feel sharp at first then dulls out but dull never really goes away. Some days he will wake and feel great but more of the stiffness and pain. Will wake up with pains at night as well. No mouth sores. Does have dry mouth but is using celexa, flexeril. Using CBD for joint pains and helps a lot, uses dry air vaporizer. Vapes marijuana at night and this helps with his joint pains and with his sleep as well. Pain is more in MCP and PIP of hands worse currently in R hand. Pt is R handed. Fam hx RA in Pgreat GM, PGM, and paternal cousin(28 currently). Unsure of any tick bites    Needs refill of eucrisa works well for his eczema. No rashes otherwise. Reviewed PmHx, RxHx, FmHx, SocHx, AllgHx and updated and dated in the chart. Review of Systems - negative except as listed above in the HPI    Objective:     Vitals:    05/06/21 1441 05/06/21 1509   BP: (!) 141/91 (!) 146/94   Pulse: 95    Resp: 18    Temp: 98.9 °F (37.2 °C)    TempSrc: Oral    SpO2: 98%    Weight: 240 lb (108.9 kg)    Height: 5' 6\" (1.676 m)        Current Outpatient Medications   Medication Sig    crisaborole (Eucrisa) 2 % oint 1 Dose by Apply Externally route two (2) times daily as needed (eczema).  citalopram (CELEXA) 20 mg tablet Take 1 Tab by mouth daily.     cyclobenzaprine (FLEXERIL) 10 mg tablet TAKE 1 TABLET BY MOUTH THREE TIMES A DAY AS NEEDED FOR MUSCLE SPASMS    buPROPion XL (WELLBUTRIN XL) 150 mg tablet Take 1 Tab by mouth every morning.  triamcinolone acetonide (KENALOG) 0.1 % topical cream Apply  to affected area two (2) times a day. use thin layer    buPROPion XL (WELLBUTRIN XL) 300 mg XL tablet TAKE 1 TABLET BY MOUTH EVERY MORNING     No current facility-administered medications for this visit. Physical Examination: General appearance - alert, well appearing, and in no distress  Chest - clear to auscultation, no wheezes, rales or rhonchi, symmetric air entry  Heart - normal rate, regular rhythm, normal S1, S2, no murmurs, rubs, clicks or gallops  Musculoskeletal -mass over bilateral MCP and PIP joints worse on the right. No erythema. Tenderness over bilateral shoulder regions but reports that he has a history of multiple fractures of the left shoulder and arthritis in this joint. Skin -eczematous lesions of face    Assessment/ Plan:   Diagnoses and all orders for this visit:    1. Arthralgia, unspecified joint  -     RA + CCP ABS; Future  -     RHEUMATOID FACTOR, QT; Future  -     ANTINUCLEAR ANTIBODIES, IFA; Future  -     VAHID + DSDNA; Future  -     SED RATE (ESR); Future  -     CRP, HIGH SENSITIVITY; Future  -     COMPLEMENT, C3 & C4; Future  -     T PALLIDUM SCREEN W/REFLEX; Future  -     LYME AB TOTAL W/RFLX W BLOT; Future  -     METABOLIC PANEL, COMPREHENSIVE; Future  -     CBC WITH AUTOMATED DIFF; Future    2. Eczema, unspecified type  -     crisaborole (Eucrisa) 2 % oint; 1 Dose by Apply Externally route two (2) times daily as needed (eczema). 3. Obesity, morbid ([address]    4. Morning stiffness of joints  -     RA + CCP ABS; Future  -     RHEUMATOID FACTOR, QT; Future  -     ANTINUCLEAR ANTIBODIES, IFA; Future  -     VAHID + DSDNA; Future  -     SED RATE (ESR); Future  -     CRP, HIGH SENSITIVITY;  Future  -     COMPLEMENT, C3 & C4; Future  -     T PALLIDUM SCREEN W/REFLEX; Future  -     LYME AB TOTAL W/RFLX W BLOT; Future  -     METABOLIC PANEL, COMPREHENSIVE; Future  -     CBC WITH AUTOMATED DIFF; Future    5. Right hand pain  -     XR HAND RT MIN 3 V; Future    6. Encounter for hepatitis C screening test for low risk patient  -     HEPATITIS C AB; Future       Follow-up and Dispositions    · Return if symptoms worsen or fail to improve. I have discussed the diagnosis with the patient and the intended plan as seen in the above orders. The patient has received an after-visit summary and questions were answered concerning future plans. Pt conveyed understanding of plan.     Medication Side Effects and Warnings were discussed with patient    An electronic signature was used to authenticate this note  Juana Navas DO

## 2021-05-06 NOTE — PROGRESS NOTES
Chief Complaint   Patient presents with    Joint Pain     stiffness     Patient presents in office for all over joint pain. Pt states he has became a problem last fall but increasingly getting worse. Admits worse in the morning, some days it gets better when he is moving, others it does not. Some days he doesn't notice the pain. Needs Eucrisa refill. 1. Have you been to the ER, urgent care clinic since your last visit? Hospitalized since your last visit? No    2. Have you seen or consulted any other health care providers outside of the [address] [place] [address] since your last visit? Include any pap smears or colon screening.  No

## 2021-05-08 LAB
ALBUMIN SERPL-MCNC: 4.9 G/DL (ref 4–5)
ALBUMIN/GLOB SERPL: 2 {RATIO} (ref 1.2–2.2)
ALP SERPL-CCNC: 67 IU/L (ref 39–117)
ALT SERPL-CCNC: 30 IU/L (ref 0–44)
ANA TITR SER IF: NEGATIVE {TITER}
AST SERPL-CCNC: 23 IU/L (ref 0–40)
B BURGDOR IGG+IGM SER-ACNC: <0.91 ISR (ref 0–0.9)
BASOPHILS # BLD AUTO: 0.1 X10E3/UL (ref 0–0.2)
BASOPHILS NFR BLD AUTO: 1 %
BILIRUB SERPL-MCNC: 0.7 MG/DL (ref 0–1.2)
BUN SERPL-MCNC: 13 MG/DL (ref 6–20)
BUN/CREAT SERPL: 14 (ref 9–20)
C3 SERPL-MCNC: 144 MG/DL (ref 82–167)
C4 SERPL-MCNC: 48 MG/DL (ref 12–38)
CALCIUM SERPL-MCNC: 9.7 MG/DL (ref 8.7–10.2)
CCP IGA+IGG SERPL IA-ACNC: 6 UNITS (ref 0–19)
CHLORIDE SERPL-SCNC: 100 MMOL/L (ref 96–106)
CO2 SERPL-SCNC: 25 MMOL/L (ref 20–29)
CREAT SERPL-MCNC: 0.9 MG/DL (ref 0.76–1.27)
CRP SERPL HS-MCNC: 3.45 MG/L (ref 0–3)
DSDNA AB SER-ACNC: <1 IU/ML (ref 0–9)
ENA RNP AB SER-ACNC: <0.2 AI (ref 0–0.9)
ENA SM AB SER-ACNC: <0.2 AI (ref 0–0.9)
EOSINOPHIL # BLD AUTO: 0.1 X10E3/UL (ref 0–0.4)
EOSINOPHIL NFR BLD AUTO: 1 %
ERYTHROCYTE [DISTWIDTH] IN BLOOD BY AUTOMATED COUNT: 12.6 % (ref 11.6–15.4)
ERYTHROCYTE [SEDIMENTATION RATE] IN BLOOD BY WESTERGREN METHOD: 5 MM/HR (ref 0–15)
GLOBULIN SER CALC-MCNC: 2.4 G/DL (ref 1.5–4.5)
GLUCOSE SERPL-MCNC: 98 MG/DL (ref 65–99)
HCT VFR BLD AUTO: 47.1 % (ref 37.5–51)
HCV AB S/CO SERPL IA: <0.1 S/CO RATIO (ref 0–0.9)
HGB BLD-MCNC: 16.1 G/DL (ref 13–17.7)
IMM GRANULOCYTES # BLD AUTO: 0 X10E3/UL (ref 0–0.1)
IMM GRANULOCYTES NFR BLD AUTO: 0 %
LYMPHOCYTES # BLD AUTO: 2 X10E3/UL (ref 0.7–3.1)
LYMPHOCYTES NFR BLD AUTO: 22 %
MCH RBC QN AUTO: 31 PG (ref 26.6–33)
MCHC RBC AUTO-ENTMCNC: 34.2 G/DL (ref 31.5–35.7)
MCV RBC AUTO: 91 FL (ref 79–97)
MONOCYTES # BLD AUTO: 0.7 X10E3/UL (ref 0.1–0.9)
MONOCYTES NFR BLD AUTO: 8 %
NEUTROPHILS # BLD AUTO: 6.4 X10E3/UL (ref 1.4–7)
NEUTROPHILS NFR BLD AUTO: 68 %
PLATELET # BLD AUTO: 269 X10E3/UL (ref 150–450)
POTASSIUM SERPL-SCNC: 4.5 MMOL/L (ref 3.5–5.2)
PROT SERPL-MCNC: 7.3 G/DL (ref 6–8.5)
RBC # BLD AUTO: 5.2 X10E6/UL (ref 4.14–5.8)
RHEUMATOID FACT SERPL-ACNC: <10 IU/ML (ref 0–13.9)
SODIUM SERPL-SCNC: 139 MMOL/L (ref 134–144)
TREPONEMA PALLIDUM IGG+IGM AB [PRESENCE] IN SERUM OR PLASMA BY IMMUNOASSAY: NON REACTIVE
WBC # BLD AUTO: 9.3 X10E3/UL (ref 3.4–10.8)

## 2021-05-11 ENCOUNTER — HOSPITAL ENCOUNTER (OUTPATIENT)
Dept: GENERAL RADIOLOGY | Age: 41
Discharge: HOME | End: 2021-05-11
Attending: FAMILY MEDICINE
Payer: COMMERCIAL

## 2021-05-11 DIAGNOSIS — M79.641 RIGHT HAND PAIN: ICD-10-CM

## 2021-05-11 PROCEDURE — 73130 X-RAY EXAM OF HAND: CPT

## 2021-05-11 NOTE — PROGRESS NOTES
Your x-ray came back normal.  I would still like for you to see a rheumatologist given your symptoms. Use [facility] rheumatology up on [facility] and their phone number is [phone].

## 2021-05-11 NOTE — PROGRESS NOTES
One of your inflammatory markers came back slightly elevated. This by itself does not mean anything though. Rheumatoid arthritis labs came back negative.   There is seronegative rheumatoid arthritis and make sure you get that x-ray done If you have not already

## 2021-05-16 DIAGNOSIS — G89.29 CHRONIC MIDLINE LOW BACK PAIN WITH LEFT-SIDED SCIATICA: ICD-10-CM

## 2021-05-16 DIAGNOSIS — M54.42 CHRONIC MIDLINE LOW BACK PAIN WITH LEFT-SIDED SCIATICA: ICD-10-CM

## 2021-05-17 ENCOUNTER — PATIENT MESSAGE (OUTPATIENT)
Dept: FAMILY MEDICINE CLINIC | Age: 41
End: 2021-05-17

## 2021-05-18 ENCOUNTER — VIRTUAL VISIT (OUTPATIENT)
Dept: FAMILY MEDICINE CLINIC | Age: 41
End: 2021-05-18
Payer: COMMERCIAL

## 2021-05-18 DIAGNOSIS — M25.50 ARTHRALGIA, UNSPECIFIED JOINT: ICD-10-CM

## 2021-05-18 DIAGNOSIS — M25.60 MORNING STIFFNESS OF JOINTS: ICD-10-CM

## 2021-05-18 DIAGNOSIS — R11.2 NON-INTRACTABLE VOMITING WITH NAUSEA, UNSPECIFIED VOMITING TYPE: ICD-10-CM

## 2021-05-18 DIAGNOSIS — I10 ESSENTIAL HYPERTENSION: Primary | ICD-10-CM

## 2021-05-18 PROCEDURE — 99214 OFFICE O/P EST MOD 30 MIN: CPT | Performed by: FAMILY MEDICINE

## 2021-05-18 RX ORDER — ONDANSETRON 4 MG/1
4 TABLET, ORALLY DISINTEGRATING ORAL
Qty: 20 TAB | Refills: 1 | Status: SHIPPED | OUTPATIENT
Start: 2021-05-18 | End: 2021-07-20

## 2021-05-18 RX ORDER — LOSARTAN POTASSIUM 50 MG/1
50 TABLET ORAL DAILY
Qty: 30 TAB | Refills: 1 | Status: SHIPPED | OUTPATIENT
Start: 2021-05-18 | End: 2021-06-09

## 2021-05-18 RX ORDER — CYCLOBENZAPRINE HCL 10 MG
TABLET ORAL
Qty: 60 TAB | Refills: 2 | Status: SHIPPED | OUTPATIENT
Start: 2021-05-18 | End: 2021-07-20

## 2021-05-18 NOTE — PATIENT INSTRUCTIONS
A Healthy Lifestyle: Care Instructions Your Care Instructions A healthy lifestyle can help you feel good, stay at a healthy weight, and have plenty of energy for both work and play. A healthy lifestyle is something you can share with your whole family. A healthy lifestyle also can lower your risk for serious health problems, such as high blood pressure, heart disease, and diabetes. You can follow a few steps listed below to improve your health and the health of your family. Follow-up care is a key part of your treatment and safety. Be sure to make and go to all appointments, and call your doctor if you are having problems. It's also a good idea to know your test results and keep a list of the medicines you take. How can you care for yourself at home? · Do not eat too much sugar, fat, or fast foods. You can still have dessert and treats now and then. The goal is moderation. · Start small to improve your eating habits. Pay attention to portion sizes, drink less juice and soda pop, and eat more fruits and vegetables. ? Eat a healthy amount of food. A 3-ounce serving of meat, for example, is about the size of a deck of cards. Fill the rest of your plate with vegetables and whole grains. ? Limit the amount of soda and sports drinks you have every day. Drink more water when you are thirsty. ? Eat plenty of fruits and vegetables every day. Have an apple or some carrot sticks as an afternoon snack instead of a candy bar. Try to have fruits and/or vegetables at every meal. 
· Make exercise part of your daily routine. You may want to start with simple activities, such as walking, bicycling, or slow swimming. Try to be active 30 to 60 minutes every day. You do not need to do all 30 to 60 minutes all at once. For example, you can exercise 3 times a day for 10 or 20 minutes.  Moderate exercise is safe for most people, but it is always a good idea to talk to your doctor before starting an exercise program. 
· Keep moving. Hannah Salinas the lawn, work in the garden, or [employer]. Take the stairs instead of the elevator at work. · If you smoke, quit. People who smoke have an increased risk for heart attack, stroke, cancer, and other lung illnesses. Quitting is hard, but there are ways to boost your chance of quitting tobacco for good. ? Use nicotine gum, patches, or lozenges. ? Ask your doctor about stop-smoking programs and medicines. ? Keep trying. In addition to reducing your risk of diseases in the future, you will notice some benefits soon after you stop using tobacco. If you have shortness of breath or asthma symptoms, they will likely get better within a few weeks after you quit. · Limit how much alcohol you drink. Moderate amounts of alcohol (up to 2 drinks a day for men, 1 drink a day for women) are okay. But drinking too much can lead to liver problems, high blood pressure, and other health problems. Family health If you have a family, there are many things you can do together to improve your health. · Eat meals together as a family as often as possible. · Eat healthy foods. This includes fruits, vegetables, lean meats and dairy, and whole grains. · Include your family in your fitness plan. Most people think of activities such as jogging or tennis as the way to fitness, but there are many ways you and your family can be more active. Anything that makes you breathe hard and gets your heart pumping is exercise. Here are some tips: 
? Walk to do errands or to take your child to school or the bus. 
? Go for a family bike ride after dinner instead of watching TV. Where can you learn more? Go to http://www.gray.com/ Enter M584 in the search box to learn more about \"A Healthy Lifestyle: Care Instructions. \" Current as of: September 23, 2020               Content Version: 12.8 © 1657-5869 Healthwise, Incorporated.   
Care instructions adapted under license by [employer] (which disclaims liability or warranty for this information). If you have questions about a medical condition or this instruction, always ask your healthcare professional. [address] any warranty or liability for your use of this information.

## 2021-05-18 NOTE — PROGRESS NOTES
Progress Note    he is a 45y.o. year old male who presents for evalution. Subjective:     Patient's blood pressure was elevated on his last visit in the office. States that he is continuing to have elevated blood pressure running in the 130 systolic. He is ready to start something for his blood pressure. He received a second Materna Covid vaccine last Tuesday felt fine until Wednesday he felt achy fever nausea vomiting the fever and that resolved but has been having difficulty keeping food down since last week. No diarrhea no change in smell or taste. Tried eating yogurt over the weekend and then threw it up. Patient needs official referral put in for rheumatology for his last visit. This place. Reviewed PmHx, RxHx, FmHx, SocHx, AllgHx and updated and dated in the chart. Review of Systems - negative except as listed above in the HPI    Objective: There were no vitals filed for this visit. Current Outpatient Medications   Medication Sig    losartan (COZAAR) 50 mg tablet Take 1 Tab by mouth daily.  ondansetron (ZOFRAN ODT) 4 mg disintegrating tablet Take 1 Tab by mouth every eight (8) hours as needed for Nausea or Vomiting.  crisaborole (Eucrisa) 2 % oint 1 Dose by Apply Externally route two (2) times daily as needed (eczema).  citalopram (CELEXA) 20 mg tablet Take 1 Tab by mouth daily.  cyclobenzaprine (FLEXERIL) 10 mg tablet TAKE 1 TABLET BY MOUTH THREE TIMES A DAY AS NEEDED FOR MUSCLE SPASMS    buPROPion XL (WELLBUTRIN XL) 150 mg tablet Take 1 Tab by mouth every morning.  triamcinolone acetonide (KENALOG) 0.1 % topical cream Apply  to affected area two (2) times a day. use thin layer    buPROPion XL (WELLBUTRIN XL) 300 mg XL tablet TAKE 1 TABLET BY MOUTH EVERY MORNING     No current facility-administered medications for this visit.         Physical Examination: General appearance - alert, well appearing, and in no distress  Mental status - alert, oriented to person, place, and time      Assessment/ Plan:   Diagnoses and all orders for this visit:    1. Essential hypertension  -     losartan (COZAAR) 50 mg tablet; Take 1 Tab by mouth daily. Follow-up in the office in the next month  2. Non-intractable vomiting with nausea, unspecified vomiting type  -     ondansetron (ZOFRAN ODT) 4 mg disintegrating tablet; Take 1 Tab by mouth every eight (8) hours as needed for Nausea or Vomiting. Treat symptomatically continue with brat diet advance as tolerated  3. Morning stiffness of joints  -     REFERRAL TO RHEUMATOLOGY    4. Arthralgia, unspecified joint  -     REFERRAL TO RHEUMATOLOGY       Follow-up and Dispositions    · Return in about 4 weeks (around 6/15/2021), or if symptoms worsen or fail to improve. I have discussed the diagnosis with the patient and the intended plan as seen in the above orders. The patient has received an after-visit summary and questions were answered concerning future plans. Pt conveyed understanding of plan. Medication Side Effects and Warnings were discussed with patient      Kalyan Saini is being evaluated by a Virtual Visit (video visit) encounter to address concerns as mentioned above. A caregiver was present when appropriate. Due to this being a TeleHealth encounter (During [facility] public health emergency), evaluation of the following organ systems was limited: Vitals/Constitutional/EENT/Resp/CV/GI//MS/Neuro/Skin/Heme-Lymph-Imm. Pursuant to the emergency declaration under the [address] [place] [address], [address] [place] [address] and the [employer] and [employer] Act, this Virtual Visit was conducted with patient's (and/or legal guardian's) consent, to reduce the patient's risk of exposure to COVID-19 and provide necessary medical care.   The patient (and/or legal guardian) has also been advised to contact this office for worsening conditions or problems, and seek emergency medical treatment and/or call 911 if deemed necessary. Patient identification was verified at the start of the visit: Yes    Services were provided through a video synchronous discussion virtually to substitute for in-person clinic visit. Patient and provider were located at their individual homes.   --Shai Altamirano DO on 5/18/2021 at 9:10 AM

## 2021-06-09 DIAGNOSIS — I10 ESSENTIAL HYPERTENSION: ICD-10-CM

## 2021-06-09 RX ORDER — LOSARTAN POTASSIUM 50 MG/1
TABLET ORAL
Qty: 30 TABLET | Refills: 1 | Status: SHIPPED | OUTPATIENT
Start: 2021-06-09 | End: 2021-07-09

## 2021-06-17 ENCOUNTER — TRANSCRIBE ORDER (OUTPATIENT)
Dept: NEUROLOGY | Age: 41
End: 2021-06-17

## 2021-06-17 ENCOUNTER — TELEPHONE (OUTPATIENT)
Dept: RHEUMATOLOGY | Age: 41
End: 2021-06-17

## 2021-06-30 ENCOUNTER — OFFICE VISIT (OUTPATIENT)
Dept: RHEUMATOLOGY | Age: 41
End: 2021-06-30
Payer: COMMERCIAL

## 2021-06-30 VITALS
HEART RATE: 91 BPM | BODY MASS INDEX: 37.9 KG/M2 | DIASTOLIC BLOOD PRESSURE: 91 MMHG | WEIGHT: 234.8 LBS | SYSTOLIC BLOOD PRESSURE: 134 MMHG | TEMPERATURE: 98.6 F

## 2021-06-30 DIAGNOSIS — Z79.60 LONG-TERM USE OF IMMUNOSUPPRESSANT MEDICATION: ICD-10-CM

## 2021-06-30 DIAGNOSIS — M06.09 SERONEGATIVE RHEUMATOID ARTHRITIS OF MULTIPLE SITES (HCC): Primary | ICD-10-CM

## 2021-06-30 PROCEDURE — 99204 OFFICE O/P NEW MOD 45 MIN: CPT | Performed by: INTERNAL MEDICINE

## 2021-06-30 RX ORDER — FOLIC ACID 1 MG/1
1 TABLET ORAL DAILY
Qty: 90 TABLET | Refills: 5 | Status: SHIPPED | OUTPATIENT
Start: 2021-06-30 | End: 2021-09-08 | Stop reason: ALTCHOICE

## 2021-06-30 RX ORDER — METHOTREXATE 2.5 MG/1
20 TABLET ORAL
Qty: 96 TABLET | Refills: 0 | Status: SHIPPED | OUTPATIENT
Start: 2021-07-01 | End: 2021-07-18

## 2021-06-30 RX ORDER — ACETAMINOPHEN 500 MG
500 TABLET ORAL
COMMUNITY

## 2021-06-30 NOTE — PATIENT INSTRUCTIONS
METHOTREXATE    Methotrexate is a weekly regimen that is supplemented with daily folic acid to help counteract potential side effects. Potential Adverse Affects    - Nausea  - Vomiting  - Upset stomach  - Oral ulcers  - Hair thinning  - Infection  - Liver function abnormalities  - Blood count abnormalities  - Rarely pneumonitis      Medication Monitoring    You will need routine blood counts and kidney and liver testing as a measure of monitoring for long term use of immunosuppressants. Things You Should Do    You should avoid ill contacts     You should get annual influenza vaccines and the pneumonia vaccines. You should apply SPF 50 sunscreen when out in the sun. You should avoid alcohol the day before, the day of and the day after your dose, as it may hasten hepatotoxicity. You should avoid pregnancy due to risk for birth defects. You should contact me if you are sick. You should hold methotrexate if you are sick and resume after your sickness resolves. If you have any problems or side effects with this medication, please contact me immediately to inform me. Plan    I prescribed methotrexate 20 mg (8 tablets) every THURSDAY at bedtime with DAILY folic acid 1 mg. Start with 6 tablets this THURSDAY and if tolerated, increase to 8 tablets the following THURSDAY and continue 8 tablets every THURSDAY. If the folic acid 1 mg is not covered, then you may take two tablets of the over the counter [employer] folic acid 350 mcg (total of 800 mcg daily). Methotrexate may take 6 to 12 weeks to be effective.     Labs    Please do labs before your 4th dose of methotrexate. (lab slip provided to you)

## 2021-06-30 NOTE — LETTER
6/30/2021    Patient: Kalyan Saini   YOB: 1980   Date of Visit: 6/30/2021     Dora Joshua[address]  [address] [place] [address]    Dear Dora Joshua DO,      Thank you for referring Mr. Caleb Nguyen to [facility] for evaluation. My notes for this consultation are attached. If you have questions, please do not hesitate to call me. I look forward to following your patient along with you.       Sincerely,    Toni Lamas MD

## 2021-06-30 NOTE — PROGRESS NOTES
REASON FOR VISIT    This is the initial evaluation for Mr. Eileen Pereira a 45 y.o. / male for question of an inflammatory arthritis. The patient is referred to the [facility] at the request of Dr. Helder Morton. HISTORY OF PRESENT ILLNESS      I have reviewed and summarized old records from [facility]    He has a history of anxiety, depression and diabetes. In 8/18/2013, MRI Lumbar Spine without showed There is normal alignment of the lumbar spine. Vertebral body heights are maintained. Disc desiccation loss of disc height. .  The conus medullaris terminates at L1. Loss of normal lumbar lordosis. L1/2:  The spinal canal and neuroforamina are widely patent. L2/3:  The spinal canal and neuroforamina are widely patent. L3/4:  Minimal facet degenerative change with fluid in the facet joint on the right. . L4/5:  Minimal facet degenerative change. Disc bulge. The spinal canal is patent. The foramina are patent. L5/S1:  Minimal facet degenerative change. Disc desiccation. The spinal canal and foramina are patent. The visualized musculature and intraperitoneal structures are within normal limits     In 4/2021, he developed pain, swelling, and stiffness in his hands. He noticed that when playing his new base guitar, he had burning constant pain that was unusual for him. This then progressed to involve his shoulders, hips, ankles, and feet. His shoulders alternate but the other joints are usually bilateral. He feels like the pain is a pain that needs to be cracked/pop that is amplified like an ache. His cousin was diagnosed with Rheumatoid Arthritis around 10 years ago.     In 5/06/2021, labs showed WBC 9.3, lymphocytes 2.0, Hct 47.1%, platelets 431,673, creatinine 0.90 mg/dL, eGFR 107, albumin 4.9 g/dL, ALK 67 U/L, ALT 30 U/L, AST 23 U/L, ESR 5 mm/hr, elevated hsCRP 3.45 mg/L, C4 48 mg/dL, normal C3 144 mg/dL, negative ALESSANDRA IFA, anti-dsDNA Ab, anti-Sm, anti-RNP, rheumatoid factor, anti-CCP, HCV, RPR, Lyme.    In 5/11/2021, Right Hand radiograph showed No acute fracture or dislocation. There is a small chronic ossicle adjacent to the ulnar styloid related to prior injury. No significant arthritis. The soft tissues appear unremarkable. Today, he complains of pain in his back, inner hips, shoulders, and hands. His pain is an ache that improves with activity, associated with stiffness lasting at least an hour and swelling. The pain may last up to 2 hours. Some days, he has not pain. He has chronic back pain, so if he has back pain that is not usual. He has used NSAIDs without benefit but APAP helps reduce his pain and swelling. Hot shower helps. Being active, he feels better. Therapy History includes:  Current DMARD therapy includes: none  Prior DMARD therapy includes: none  The following DMARDs have been ineffective: none  The following DMARDs were stopped because of side effects: none    REVIEW OF SYSTEMS    A 15 point review of systems was performed and summarized below. The questionnaire was reviewed with the patient and scanned into the patient's medical record.     General: endorses recent 20 lbs weight loss, fatigue, weakness, denies recent weight gain, fever, drenching night sweats  Musculoskeletal: endorses joint pain, joint swelling, morning stiffness (lasting 1 hour), denies muscle pain  Ears: endorses ringing in ears, denies hearing loss, deafness  Eyes: denies pain, light sensitive, redness, blindness, double vision, blurred vision, excess tearing, dryness, foreign body sensation  Mouth: denies sore tongue, oral ulcers, loss of taste, dryness, increased dental caries  Nose: denies nosebleeds, nasal ulcers  Throat: denies food stuck when swallowing, difficulty with swallowing, hoarseness, pain in jaw while chewing  Neck: denies swollen glands, tender glands  Cardiopulmonary: endorses murmurs, denies pain in chest with deep breaths, pain in chest when lying down, sudden changes in heart beat, wheezing, dry cough, productive cough, shortness of breath at rest, shortness of breath on exertion, coughing of blood  Gastrointestinal: denies nausea, heartburn, stomach pain relieved by food, chronic constipation, chronic diarrhea, blood in stools, black stools  Genitourinary: denies pain or burning on urination, blood in urine, cloudy urine, penile ulcers  Hematologic: denies anemia, bleeding tendency, blood clots, bleeding gums  Skin: denies easy bruising, hair loss, rash, rash worsened after sun exposure, hives/urticaria, skin thickening, skin tightness, nodules/bumps, color changes of hands or feet in the cold (Raynaud's)  Neurologic: endorses numbness or tingling in hands, numbness or tingling in feet, muscle weakness  Psychiatric: endorses depression, excessive worries, denies PTSD, Bipolar  Sleep: endorses poor sleep (6-8 hours), snoring, difficulty falling asleep, difficulty staying asleep , denies apnea, daytime somnolence    PAST MEDICAL HISTORY    He has a past medical history of Anxiety disorder, Arthritis, Depression, Eczema, Obesity, Class II, BMI 35-39.9, and Tendonitis. FAMILY HISTORY    His family history includes Alcohol abuse in his mother; Depression in his mother; Diabetes in his father; Heart Disease in his father; Hypertension in his father; Psychiatric Disorder in his mother. SOCIAL HISTORY    He reports that he quit smoking about 3 years ago. He quit after 15.00 years of use. He has never used smokeless tobacco. He reports current alcohol use of about 2.0 - 3.0 standard drinks of alcohol per week. He reports current drug use. Drug: Marijuana. MEDICATIONS    Current Outpatient Medications   Medication Sig    acetaminophen (Tylenol Extra Strength) 500 mg tablet Take 500 mg by mouth every six (6) hours as needed for Pain.  [START ON 7/1/2021] methotrexate (RHEUMATREX) 2.5 mg tablet Take 8 Tablets by mouth Every Thursday.     folic acid (FOLVITE) 1 mg tablet Take 1 Tablet by mouth daily.  losartan (COZAAR) 50 mg tablet TAKE 1 TABLET BY MOUTH EVERY DAY    cyclobenzaprine (FLEXERIL) 10 mg tablet TAKE 1 TABLET BY MOUTH THREE TIMES A DAY AS NEEDED FOR MUSCLE SPASMS    ondansetron (ZOFRAN ODT) 4 mg disintegrating tablet Take 1 Tab by mouth every eight (8) hours as needed for Nausea or Vomiting.  crisaborole (Eucrisa) 2 % oint 1 Dose by Apply Externally route two (2) times daily as needed (eczema).  citalopram (CELEXA) 20 mg tablet Take 1 Tab by mouth daily.  buPROPion XL (WELLBUTRIN XL) 150 mg tablet Take 1 Tab by mouth every morning.  triamcinolone acetonide (KENALOG) 0.1 % topical cream Apply  to affected area two (2) times a day. use thin layer    buPROPion XL (WELLBUTRIN XL) 300 mg XL tablet TAKE 1 TABLET BY MOUTH EVERY MORNING     No current facility-administered medications for this visit. ALLERGIES  No Known Allergies    PHYSICAL EXAMINATION    Visit Vitals  BP (!) 134/91   Pulse 91   Temp 98.6 °F (37 °C)   Wt 234 lb 12.8 oz (106.5 kg)   BMI 37.90 kg/m²     Body mass index is 37.9 kg/m². General: Patient is alert, oriented x 3, not in acute distress    HEENT:   Conjunctiva are not injected and appear moist, there is no alopecia. Cardiovascular:  Heart is regular rate and rhythm, no murmurs. Chest:  Lungs are clear to auscultation bilaterally. Extremities:  Free of clubbing, cyanosis, edema, extremities well perfused. Neurological exam:  Muscle strength is full in upper and lower extremities. Skin exam:  There are no rashes, no tophi, no psoriasis, no active Raynaud's, no livedo reticularis, no periungual erythema. Musculoskeletal exam:  A comprehensive musculoskeletal exam was performed for all joints of each upper and lower extremity and assessed for swelling, tenderness and range of motion.  Pertinent results are documented as below:    Bilateral hip pain with internal and external rotation  Right ankle tenderness  Right MTP tenderness    Z-Deformities:   no  Gilman Neck Deformities:  no  Boutonierre's Deformities:  no  Ulnar Deviation:   no  MCP Subluxation:  no    Joint Count 6/30/2021   Patient pain (0-100) 70   MHAQ 1   Left shoulder - Tender 1   Left shoulder - Swollen 0   Left 1st MCP - Tender 1   Left 1st MCP - Swollen 0   Left 2nd MCP - Tender 1   Left 2nd MCP - Swollen 0   Left 3rd MCP - Tender 1   Left 3rd MCP - Swollen 0   Left 4th MCP - Tender 1   Left 4th MCP - Swollen 0   Left 5th MCP - Tender 1   Left 5th MCP - Swollen 0   Left thumb IP - Tender 1   Left thumb IP - Swollen 0   Left 2nd PIP - Tender 1   Left 2nd PIP - Swollen 0   Left 3rd PIP - Tender 1   Left 3rd PIP - Swollen 0   Left 4th PIP - Tender 1   Left 4th PIP - Swollen 0   Left 5th PIP - Tender 1   Left 5th PIP - Swollen 0   Right shoulder - Tender 1   Right shoulder - Swollen 0   Right wrist- Tender 1   Right wrist- Swollen 0   Right 3rd MCP - Tender 1   Right 3rd MCP - Swollen 0   Right 5th MCP - Tender 1   Right 5th MCP - Swollen 0   Right 3rd PIP - Tender 1   Right 3rd PIP - Swollen 0   Right 4th PIP - Tender 1   Right 4th PIP - Swollen 0   Right 5th PIP - Tender 1   Right 5th PIP - Swollen 1   Tender Joint Count (Total) 18   Swollen Joint Count (Total) 1   Patient Assessment (0-10) 7.5       DATA REVIEW    Prior medical records were reviewed and are summarized as below:    Laboratory data: summarized in the HPI    Imaging: summarized in the HPI. ASSESSMENT AND PLAN    1) Likely Seronegative Rheumatoid Arthritis. He reports new onset inflammatory joint symptoms since 4/2020. His CDAI was 28.5 with 18 tender and 1 swollen joint, with right ankle and foot involvement, consistent with high disease activity. We discussed initiation of DMARD therapy with methotrexate, which is first line therapy in Rheumatoid Arthritis. It is a weekly regimen that is supplemented with daily folic acid to help counteract potential side effects.  I discussed with the patient the potential adverse effects, which include: nausea, vomiting, dyspepsia, oral ulcers, hair thinning, infection, liver function abnormalities, blood count abnormalities, and rarely pneumonitis or melanoma. The patient understood these possible adverse effects. I informed the patient of the need for routine CBC and CMP as a measure for long term use of immunosuppressants. I also instructed the patient to avoid ill contacts and the needs for annual influenza vaccines and the pneumonia vaccines. I asked the patient to apply SPF 50 sunscreen when out in the sun. The patient was also instructed to avoid alcohol as it may hasten hepatotoxicity. In childbearing patients, pregnancy is contra-indicated while on methotrexate due to risk for birth defects and early termination. I prescribed methotrexate 20 mg every Thursday with daily folic acid 1 mg with instructions to take 15 mg once and if tolerated, to increase to 20 mg the following week. I informed the patient that methotrexate may take 6 to 12 weeks to be effective. Patient was informed to contact me if they develop any adverse reaction or infection. I ordered labs to be drawn before the 4th dose of methotrexate. 2) Long Term Use of Immunosuppressants. The patient will initiate on high risk immunomodulatory medications (methotrexate) and requires frequent toxicity monitoring by blood work to evaluate for toxicities. Respective labs were ordered (see below orders for details). The patient voiced understanding of the aforementioned assessment and plan. Summary of plan was provided in the After Visit Summary patient instructions. I also provided education about MyChart setup and utility. A total of 55 minutes was spent on this visit, reviewing interval notes, interval testing results, ordering tests, refilling medications, documenting the findings in the note, patient education, counseling, and coordination of care as described above.  All questions asked and answered.     TODAY'S ORDERS    Orders Placed This Encounter    CHRONIC HEPATITIS PANEL    QUANTIFERON-TB PLUS(CLIENT INCUB.)    CBC WITH AUTOMATED DIFF    METABOLIC PANEL, COMPREHENSIVE    C REACTIVE PROTEIN, QT    SED RATE (ESR)    METHOTREXATE POLYGLUTAMATES    methotrexate (RHEUMATREX) 2.5 mg tablet    folic acid (FOLVITE) 1 mg tablet     Future Appointments   Date Time Provider Elier Ramachandran   9/8/2021  2:00 PM Dinh Quintero MD [facility] BS AMB     Addie Spangler MD, [facility]    Adult Rheumatology   Rheumatology Ultrasound Certified  [id number] [facility] [address]  [address] [facility], [address]   Phone [phone]  Fax [phone]    Patient Care Team:  Mp Kwok DO as PCP - General (Family Medicine)  Mp Kwok DO as PCP - [facility] Empaneled Provider  Fartun Wade MD (Surgery)

## 2021-07-09 DIAGNOSIS — I10 ESSENTIAL HYPERTENSION: ICD-10-CM

## 2021-07-09 RX ORDER — LOSARTAN POTASSIUM 50 MG/1
TABLET ORAL
Qty: 30 TABLET | Refills: 1 | Status: SHIPPED | OUTPATIENT
Start: 2021-07-09 | End: 2021-08-03

## 2021-07-18 DIAGNOSIS — M54.42 CHRONIC MIDLINE LOW BACK PAIN WITH LEFT-SIDED SCIATICA: ICD-10-CM

## 2021-07-18 DIAGNOSIS — M06.09 SERONEGATIVE RHEUMATOID ARTHRITIS OF MULTIPLE SITES (HCC): ICD-10-CM

## 2021-07-18 DIAGNOSIS — R11.2 NON-INTRACTABLE VOMITING WITH NAUSEA, UNSPECIFIED VOMITING TYPE: ICD-10-CM

## 2021-07-18 DIAGNOSIS — G89.29 CHRONIC MIDLINE LOW BACK PAIN WITH LEFT-SIDED SCIATICA: ICD-10-CM

## 2021-07-18 RX ORDER — METHOTREXATE 2.5 MG/1
20 TABLET ORAL
Qty: 96 TABLET | Refills: 0 | Status: SHIPPED | OUTPATIENT
Start: 2021-07-22 | End: 2021-09-08 | Stop reason: SINTOL

## 2021-07-20 RX ORDER — CYCLOBENZAPRINE HCL 10 MG
TABLET ORAL
Qty: 60 TABLET | Refills: 2 | Status: SHIPPED | OUTPATIENT
Start: 2021-07-20 | End: 2021-10-17 | Stop reason: SDUPTHER

## 2021-07-20 RX ORDER — ONDANSETRON 4 MG/1
TABLET, ORALLY DISINTEGRATING ORAL
Qty: 20 TABLET | Refills: 1 | Status: SHIPPED | OUTPATIENT
Start: 2021-07-20

## 2021-08-02 DIAGNOSIS — I10 ESSENTIAL HYPERTENSION: ICD-10-CM

## 2021-08-03 LAB
ALBUMIN SERPL-MCNC: 4.7 G/DL (ref 4–5)
ALBUMIN/GLOB SERPL: 1.6 {RATIO} (ref 1.2–2.2)
ALP SERPL-CCNC: 67 IU/L (ref 48–121)
ALT SERPL-CCNC: 25 IU/L (ref 0–44)
AST SERPL-CCNC: 16 IU/L (ref 0–40)
BASOPHILS # BLD AUTO: 0 X10E3/UL (ref 0–0.2)
BASOPHILS NFR BLD AUTO: 1 %
BILIRUB SERPL-MCNC: 1.1 MG/DL (ref 0–1.2)
BUN SERPL-MCNC: 13 MG/DL (ref 6–20)
BUN/CREAT SERPL: 12 (ref 9–20)
CALCIUM SERPL-MCNC: 9.5 MG/DL (ref 8.7–10.2)
CHLORIDE SERPL-SCNC: 101 MMOL/L (ref 96–106)
CO2 SERPL-SCNC: 26 MMOL/L (ref 20–29)
COMMENT, 144067: NORMAL
CREAT SERPL-MCNC: 1.05 MG/DL (ref 0.76–1.27)
CRP SERPL-MCNC: 6 MG/L (ref 0–10)
EOSINOPHIL # BLD AUTO: 0.1 X10E3/UL (ref 0–0.4)
EOSINOPHIL NFR BLD AUTO: 1 %
ERYTHROCYTE [DISTWIDTH] IN BLOOD BY AUTOMATED COUNT: 13 % (ref 11.6–15.4)
ERYTHROCYTE [SEDIMENTATION RATE] IN BLOOD BY WESTERGREN METHOD: 19 MM/HR (ref 0–15)
GAMMA INTERFERON BACKGROUND BLD IA-ACNC: 0 IU/ML
GLOBULIN SER CALC-MCNC: 3 G/DL (ref 1.5–4.5)
GLUCOSE SERPL-MCNC: 94 MG/DL (ref 65–99)
HBV CORE AB SERPL QL IA: NEGATIVE
HBV CORE IGM SERPL QL IA: NEGATIVE
HBV E AB SERPL QL IA: NEGATIVE
HBV E AG SERPL QL IA: NEGATIVE
HBV SURFACE AB SER QL: REACTIVE
HBV SURFACE AG SERPL QL IA: NEGATIVE
HCT VFR BLD AUTO: 45.4 % (ref 37.5–51)
HCV AB S/CO SERPL IA: <0.1 S/CO RATIO (ref 0–0.9)
HGB BLD-MCNC: 15.8 G/DL (ref 13–17.7)
IMM GRANULOCYTES # BLD AUTO: 0 X10E3/UL (ref 0–0.1)
IMM GRANULOCYTES NFR BLD AUTO: 0 %
LYMPHOCYTES # BLD AUTO: 1.6 X10E3/UL (ref 0.7–3.1)
LYMPHOCYTES NFR BLD AUTO: 26 %
M TB IFN-G BLD-IMP: NEGATIVE
M TB IFN-G CD4+ BCKGRND COR BLD-ACNC: 0.02 IU/ML
MCH RBC QN AUTO: 31.3 PG (ref 26.6–33)
MCHC RBC AUTO-ENTMCNC: 34.8 G/DL (ref 31.5–35.7)
MCV RBC AUTO: 90 FL (ref 79–97)
METHOTREXATE PG1: 17 NMOL/L RBC
METHOTREXATE PG2: 13 NMOL/L RBC
METHOTREXATE PG3: 15 NMOL/L RBC
METHOTREXATE PG4: 5 NMOL/L RBC
METHOTREXATE PG5: 2 NMOL/L RBC
METHOTREXATE-PG TOTAL: 51 NMOL/L RBC
MITOGEN IGNF BLD-ACNC: >10 IU/ML
MONOCYTES # BLD AUTO: 0.6 X10E3/UL (ref 0.1–0.9)
MONOCYTES NFR BLD AUTO: 9 %
MTXPGSRA INTERPRETATION: NORMAL
NEUTROPHILS # BLD AUTO: 3.9 X10E3/UL (ref 1.4–7)
NEUTROPHILS NFR BLD AUTO: 63 %
PLATELET # BLD AUTO: 326 X10E3/UL (ref 150–450)
POTASSIUM SERPL-SCNC: 4 MMOL/L (ref 3.5–5.2)
PROT SERPL-MCNC: 7.7 G/DL (ref 6–8.5)
QUANTIFERON INCUBATION, QF1T: NORMAL
QUANTIFERON TB2 AG: 0 IU/ML
RBC # BLD AUTO: 5.04 X10E6/UL (ref 4.14–5.8)
SERVICE CMNT-IMP: NORMAL
SODIUM SERPL-SCNC: 140 MMOL/L (ref 134–144)
WBC # BLD AUTO: 6.2 X10E3/UL (ref 3.4–10.8)

## 2021-08-03 RX ORDER — LOSARTAN POTASSIUM 50 MG/1
TABLET ORAL
Qty: 30 TABLET | Refills: 1 | Status: SHIPPED | OUTPATIENT
Start: 2021-08-03 | End: 2021-09-01

## 2021-08-03 NOTE — PROGRESS NOTES
The results were reviewed. Subtherapeutic methotrexate level 51. ESR 19 mm/hr. All remaining labs are normal/negative.

## 2021-09-01 DIAGNOSIS — I10 ESSENTIAL HYPERTENSION: ICD-10-CM

## 2021-09-01 RX ORDER — LOSARTAN POTASSIUM 50 MG/1
TABLET ORAL
Qty: 30 TABLET | Refills: 1 | Status: SHIPPED | OUTPATIENT
Start: 2021-09-01 | End: 2021-09-27

## 2021-09-08 ENCOUNTER — OFFICE VISIT (OUTPATIENT)
Dept: RHEUMATOLOGY | Age: 41
End: 2021-09-08
Payer: COMMERCIAL

## 2021-09-08 VITALS
SYSTOLIC BLOOD PRESSURE: 136 MMHG | HEART RATE: 99 BPM | RESPIRATION RATE: 18 BRPM | DIASTOLIC BLOOD PRESSURE: 93 MMHG | TEMPERATURE: 98.3 F | WEIGHT: 224 LBS | BODY MASS INDEX: 36.15 KG/M2

## 2021-09-08 DIAGNOSIS — M06.09 SERONEGATIVE RHEUMATOID ARTHRITIS OF MULTIPLE SITES (HCC): Primary | ICD-10-CM

## 2021-09-08 DIAGNOSIS — Z79.60 LONG-TERM USE OF IMMUNOSUPPRESSANT MEDICATION: ICD-10-CM

## 2021-09-08 PROCEDURE — 99215 OFFICE O/P EST HI 40 MIN: CPT | Performed by: INTERNAL MEDICINE

## 2021-09-08 NOTE — PROGRESS NOTES
REASON FOR VISIT    This is a follow-up visit for Mr. Ayde Gaviria for     ICD-10-CM   1. Seronegative rheumatoid arthritis of multiple sites ([facility].)  M06.09     Inflammatory arthritis phenotype includes:  Anti-CCP positive: no  Rheumatoid factor positive: no  Erosive disease: no  Extra-articular manifestations include: none    Immunosuppression Screening (7/22/2021): Quantiferon TB: negative  PPD:  Not performed  Hepatitis B: negative  Hepatitis C: negative    Therapy History includes:  Current DMARD therapy include: Xeljanz 11 mg XR daily (9/08/2021; SAMPLE)  Prior DMARD therapy include: methotrexate 20 mg every Thursday (7/01/2021 to 9/08/2021)  Discontinued DMARDs because of inefficacy: None   Discontinued DMARDs because of side effects: methotrexate (short-term memory loss, intermittent dizziness and nausea)    HISTORY OF PRESENT ILLNESS    Mr. Ayde Gaviria returns for a follow-up. On his last visit, I prescribed methotrexate 20 mg every Thursday, which he thinks is helping him, but no notes mild adverse reactions, where he has loss of short-term memory during conversations. This has been going on for about 3 to 4 times a week. He also has some hearing issues due to being around loud music but this is different. He also reported intermittent dizziness and nausea. Today, he does feel better. He is able to get out of bed quicker in the morning. He has more good days than bad days. He has some pressure-like pain in inner hips and knee that feels like he needs to pop them, that improves with stretching for a few minutes then resolves. He continues to have shoulder stiffness lasting 20 to 30 minutes. He endorses unintended 10 lbs weight loss, nausea.     He denies fever, blurred vision, vision loss, oral ulcers, ankle swelling, dry cough, dyspnea, vomiting, dysphagia, abdominal pain, black or bloody stool, fall since last visit, rash, easy bruising and increased thirst.    Most recent toxicity monitoring by blood work was done on 7/22/2021 and did not reveal any significant adverse effects    Most recent inflammatory markers from 7/22/2021 revealed a ESR 19 mm/hr and CRP 6 mg/L. I reviewed the patient's interval medical history, surgical history, medications, and allergies. The patient has not had any interval hospital admissions, infections, or surgeries. REVIEW OF SYSTEMS    A comprehensive review of systems was performed and pertinent results are documented in the HPI, review of systems is otherwise non-contributory. PAST MEDICAL HISTORY    He has a past medical history of Anxiety disorder, Arthritis, Depression, Eczema, Obesity, Class II, BMI 35-39.9, and Tendonitis. FAMILY HISTORY    His family history includes Alcohol abuse in his mother; Depression in his mother; Diabetes in his father; Heart Disease in his father; Hypertension in his father; Psychiatric Disorder in his mother. SOCIAL HISTORY    He reports that he quit smoking about 3 years ago. He quit after 15.00 years of use. He has never used smokeless tobacco. He reports current alcohol use of about 2.0 - 3.0 standard drinks of alcohol per week. He reports current drug use. Drug: Marijuana. MEDICATIONS    Current Outpatient Medications   Medication Sig    tofacitinib 11 mg Tb24 Take 11 mg by mouth daily. Indications: rheumatoid arthritis    tofacitinib 11 mg Tb24 Take 11 mg by mouth daily. Indications: rheumatoid arthritis    losartan (COZAAR) 50 mg tablet TAKE 1 TABLET BY MOUTH EVERY DAY    ondansetron (ZOFRAN ODT) 4 mg disintegrating tablet TAKE 1 TABLET BY MOUTH EVERY EIGHT HOURS AS NEEDED FOR NAUSEA OR VOMITING.  cyclobenzaprine (FLEXERIL) 10 mg tablet TAKE 1 TABLET BY MOUTH THREE TIMES A DAY AS NEEDED FOR MUSCLE SPASMS    acetaminophen (Tylenol Extra Strength) 500 mg tablet Take 500 mg by mouth every six (6) hours as needed for Pain.     crisaborole (Eucrisa) 2 % oint 1 Dose by Apply Externally route two (2) times daily as needed (eczema).  citalopram (CELEXA) 20 mg tablet Take 1 Tab by mouth daily.  buPROPion XL (WELLBUTRIN XL) 150 mg tablet Take 1 Tab by mouth every morning.  triamcinolone acetonide (KENALOG) 0.1 % topical cream Apply  to affected area two (2) times a day. use thin layer    buPROPion XL (WELLBUTRIN XL) 300 mg XL tablet TAKE 1 TABLET BY MOUTH EVERY MORNING     No current facility-administered medications for this visit. ALLERGIES    No Known Allergies    PHYSICAL EXAMINATION    Visit Vitals  BP (!) 136/93   Pulse 99   Temp 98.3 °F (36.8 °C)   Resp 18   Wt 224 lb (101.6 kg)   BMI 36.15 kg/m²     Body mass index is 36.15 kg/m². General: Patient is alert, oriented x 3, not in acute distress    HEENT:   Conjunctiva are not injected and appear moist, there is no alopecia. Cardiovascular:  Heart is regular rate and rhythm, no murmurs. Chest:  Lungs are clear to auscultation bilaterally. Extremities:  Free of clubbing, cyanosis, edema, extremities well perfused. Neurological exam:  Muscle strength is full in upper and lower extremities. Skin exam:  There are no rashes, no tophi, no psoriasis, no active Raynaud's, no livedo reticularis, no periungual erythema. Musculoskeletal exam:  A comprehensive musculoskeletal exam was performed for all joints of each upper and lower extremity and assessed for swelling, tenderness and range of motion.  Positive results are documented as below:    Bilateral hip pain with internal and external rotation (RESOLVED, except right hip pain with external rotation)  Right ankle tenderness  Right MTP tenderness (RESOLVED)    Z-Deformities:   no  Emmet Neck Deformities:  no  Boutonierre's Deformities:  no  Ulnar Deviation:   no  MCP Subluxation:  no     Joint Count 9/8/2021 6/30/2021   Patient pain (0-100) 35 70   MHAQ 0.75 1   Left shoulder - Tender - 1   Left shoulder - Swollen - 0   Left 1st MCP - Tender 1 1   Left 1st MCP - Swollen 0 0   Left 2nd MCP - Tender 1 1 Left 2nd MCP - Swollen 0 0   Left 3rd MCP - Tender - 1   Left 3rd MCP - Swollen - 0   Left 4th MCP - Tender 1 1   Left 4th MCP - Swollen 0 0   Left 5th MCP - Tender - 1   Left 5th MCP - Swollen - 0   Left thumb IP - Tender - 1   Left thumb IP - Swollen - 0   Left 2nd PIP - Tender 1 1   Left 2nd PIP - Swollen 0 0   Left 3rd PIP - Tender - 1   Left 3rd PIP - Swollen - 0   Left 4th PIP - Tender - 1   Left 4th PIP - Swollen - 0   Left 5th PIP - Tender - 1   Left 5th PIP - Swollen - 0   Right shoulder - Tender - 1   Right shoulder - Swollen - 0   Right wrist- Tender - 1   Right wrist- Swollen - 0   Right 1st MCP - Tender 1 -   Right 1st MCP - Swollen 0 -   Right 3rd MCP - Tender - 1   Right 3rd MCP - Swollen - 0   Right 5th MCP - Tender - 1   Right 5th MCP - Swollen - 0   Right 3rd PIP - Tender - 1   Right 3rd PIP - Swollen - 0   Right 4th PIP - Tender - 1   Right 4th PIP - Swollen - 0   Right 5th PIP - Tender - 1   Right 5th PIP - Swollen - 1   Tender Joint Count (Total) 5 18   Swollen Joint Count (Total) 0 1   Physician Assessment (0-10) 2 2   Patient Assessment (0-10) 4.5 7.5   CDAI Total (calculated) 11.5 28.5       DATA REVIEW    Laboratory     Recent laboratory results were reviewed, summarized, and discussed with the patient. Imaging    Musculoskeletal Ultrasound    None    Radiographs    Right Hand 5/11/2021: No acute fracture or dislocation. There is a small chronic ossicle adjacent to the ulnar styloid related to prior injury. No significant arthritis. The soft tissues appear unremarkable. CT Imaging    None    MR Imaging    MRI Lumbar Spine without 8/18/2013: There is normal alignment of the lumbar spine. Vertebral body heights are maintained. Disc desiccation loss of disc height. .  The conus medullaris terminates at L1. Loss of normal lumbar lordosis. L1/2:  The spinal canal and neuroforamina are widely patent. L2/3:  The spinal canal and neuroforamina are widely patent.  L3/4:  Minimal facet degenerative change with fluid in the facet joint on the right. . L4/5:  Minimal facet degenerative change. Disc bulge. The spinal canal is patent. The foramina are patent. L5/S1:  Minimal facet degenerative change. Disc desiccation. The spinal canal and foramina are patent. The visualized musculature and intraperitoneal structures are within normal limits     DXA     None    ASSESSMENT AND PLAN    This is a follow-up visit for Mr. Jayson Benson. 1) Seronegative Rheumatoid Arthritis. He reported new onset inflammatory joint symptoms since 4/2020. He has been maintained on methotrexate 20 mg every Thursday with improvement of his inflammatory symptoms, but has been having adverse reactions (short term memory loss, intermitttent nausea and dizziness)     His CDAI was 11.5 (previously 28.5) with 5 tender and 0 swollen joint, with right ankle involvement, consistent with moderate disease activity. Due to adverse reactions, I will discontinue methotrexate. I discussed initiation with the DMARD Arava (leflunomide) versus a biologic. I informed the patient the potential adverse effects, which may include: nausea, vomiting, dyspepsia, diarrhea, oral ulcers, infection, liver function abnormalities, blood count abnormalities, paresthesia, rash, and rarely melanoma and non-melanoma skin cancer. The patient understood these possible adverse effects. I informed the patient of the need for routine CBC and CMP as a measure for long term use of immunosuppressants. I also instructed the patient to avoid ill contacts and the needs for annual influenza vaccines and the pneumonia vaccines. In childbearing patients, pregnancy is contra-indicated on leflunomide due to risk for birth defects. I informed the patient that leflunomide may take 4 to 12 weeks to be effective. I discussed with him about advancing therapy to a biologic (small particle versus anti-TNF).  We discussed the potential adverse effects, which include infections, such as upper respiratory infections, latent TB reactivation, viral hepatitis activation, and routes of administration (oral versus subcutaneous versus infusion). The patient was informed about the low risk for lymphoma based on at least 5 years of post-market data and the likely inherent relation between chronic autoimmune diseases, such as Rheumatoid Arthritis, and lymphoma. The patient was informed these medications co-pay are subject to the patient's insurance coverage and we will not know until it has been submitted to the insurance company. The patient preferred an oral FRANCISCO. An order will be submitted today for Burma Lat. I gave him a sample today. 2) Long Term Use of Immunosuppressants. The patient will initate on high risk immunomodulatory medications Larbradford Baumann) and requires frequent toxicity monitoring by blood work to evaluate for toxicities. Respective labs were ordered (see below orders for details). The patient voiced understanding of the aforementioned assessment and plan. A total of 50 minutes was spent on this visit, reviewing interval notes, interval testing results, ordering tests, refilling medications, documenting the findings in the note, patient education, counseling, and coordination of care as described above. All questions asked and answered.     TODAY'S ORDERS    Orders Placed This Encounter    CBC WITH AUTOMATED DIFF    METABOLIC PANEL, COMPREHENSIVE    C REACTIVE PROTEIN, QT    SED RATE (ESR)    LIPID PANEL    tofacitinib 11 mg Tb24    tofacitinib 11 mg Tb24     Future Appointments   Date Time Provider Elier Madie   12/8/2021  2:00 PM Suzy Frazier MD AOCR BS AMB     Kash Calvillo MD, [address] [facility]    Adult Rheumatology   Rheumatology Ultrasound Certified  [id number]  [facility], [address] [place] [address]   Phone [phone]  Fax [phone]

## 2021-09-08 NOTE — LETTER
9/8/2021    Patient: Hung Lamas   YOB: 1980   Date of Visit: 9/8/2021     Duayne Holter[address]  [address] [place] [address]    Dear Duayne Holter, DO,      Thank you for referring Mr. Tato Paige to [address] [place] [address] for evaluation. My notes for this consultation are attached. If you have questions, please do not hesitate to call me. I look forward to following your patient along with you.       Sincerely,    Corrie Melendez MD

## 2021-09-13 RX ORDER — UPADACITINIB 15 MG/1
15 TABLET, EXTENDED RELEASE ORAL DAILY
Qty: 30 EACH | Refills: 11 | Status: SHIPPED | OUTPATIENT
Start: 2021-09-13 | End: 2022-01-17 | Stop reason: SDUPTHER

## 2021-09-24 ENCOUNTER — DOCUMENTATION ONLY (OUTPATIENT)
Dept: PHARMACY | Age: 41
End: 2021-09-24

## 2021-09-24 NOTE — PROGRESS NOTES
[facility] at [address] [facility] Update    Date: 09/24/21    Stefanie Milan 1980     Medication: Rinvoq 15 mg    Prior Authorization: 9/13/21 to 9/13/22    Co-pay $0     Fill: 9/21/21    Ship: 9/23/21    Deliver: 9/24/21    Next Fill Due: 10/17/21    Pharmacist offered counseling to the patient. Handout provided.     Art Mobley, [address] at [facility], [address]  phone: [phone]   fax: [phone]

## 2021-09-27 DIAGNOSIS — I10 ESSENTIAL HYPERTENSION: ICD-10-CM

## 2021-09-27 RX ORDER — LOSARTAN POTASSIUM 50 MG/1
TABLET ORAL
Qty: 30 TABLET | Refills: 1 | Status: SHIPPED | OUTPATIENT
Start: 2021-09-27 | End: 2021-10-21

## 2021-10-01 DIAGNOSIS — L30.9 ECZEMA, UNSPECIFIED TYPE: ICD-10-CM

## 2021-10-05 RX ORDER — BUPROPION HYDROCHLORIDE 300 MG/1
TABLET ORAL
Qty: 90 TABLET | Refills: 1 | Status: SHIPPED | OUTPATIENT
Start: 2021-10-05 | End: 2022-04-05

## 2021-10-05 RX ORDER — CRISABOROLE 20 MG/G
1 OINTMENT TOPICAL
Qty: 60 G | Refills: 5 | Status: SHIPPED | OUTPATIENT
Start: 2021-10-05 | End: 2022-01-06

## 2021-10-17 DIAGNOSIS — M54.42 CHRONIC MIDLINE LOW BACK PAIN WITH LEFT-SIDED SCIATICA: ICD-10-CM

## 2021-10-17 DIAGNOSIS — G89.29 CHRONIC MIDLINE LOW BACK PAIN WITH LEFT-SIDED SCIATICA: ICD-10-CM

## 2021-10-18 RX ORDER — BUPROPION HYDROCHLORIDE 150 MG/1
150 TABLET ORAL
Qty: 30 TABLET | Refills: 11 | Status: SHIPPED | OUTPATIENT
Start: 2021-10-18 | End: 2021-11-11 | Stop reason: SDUPTHER

## 2021-10-19 RX ORDER — CYCLOBENZAPRINE HCL 10 MG
TABLET ORAL
Qty: 60 TABLET | Refills: 2 | Status: SHIPPED | OUTPATIENT
Start: 2021-10-19 | End: 2022-01-11 | Stop reason: SDUPTHER

## 2021-10-21 DIAGNOSIS — I10 ESSENTIAL HYPERTENSION: ICD-10-CM

## 2021-10-21 RX ORDER — LOSARTAN POTASSIUM 50 MG/1
TABLET ORAL
Qty: 30 TABLET | Refills: 1 | Status: SHIPPED | OUTPATIENT
Start: 2021-10-21 | End: 2021-11-22

## 2021-11-05 NOTE — PROGRESS NOTES
Subjective:      Paul Hair is a 45 y.o. white male presents for postop care 2 weeks following a lap hernia repair. Mr. Liza Lepe is doing fine. His appetite is good, and he is eating a regular diet without difficulty. His bowel movements are regular. He is not having any pain or problems with his incisions. Objective:     Visit Vitals  /84 (BP 1 Location: Left arm, BP Patient Position: Sitting)   Pulse 71   Temp 98.6 °F (37 °C) (Oral)   Resp 18   Ht 5' 6\" (1.676 m)   Wt 237 lb 8 oz (107.7 kg)   SpO2 97%   BMI 38.33 kg/m²       General:  alert, cooperative, no distress   Abdomen:  Soft, obese, NT, ND. The incisions are clean, dry, and intact with no erythema. There is no hernia. Assessment:     1st POV, s/p robot lap supraumbilical herniorrhaphy with mesh. Plan:     Mr. Liza Lepe is doing well. He will continue with light activity for another 4 weeks and then gradually resume normal activity. He can f/u with me prn. He will schedule his wife for an appointment to be evaluated for her hernia. Unable to assess due to patient's cognitive impairment

## 2021-11-11 RX ORDER — BUPROPION HYDROCHLORIDE 150 MG/1
150 TABLET ORAL
Qty: 90 TABLET | Refills: 4 | Status: SHIPPED | OUTPATIENT
Start: 2021-11-11 | End: 2022-03-08 | Stop reason: SDUPTHER

## 2021-11-20 DIAGNOSIS — I10 ESSENTIAL HYPERTENSION: ICD-10-CM

## 2021-11-22 RX ORDER — LOSARTAN POTASSIUM 50 MG/1
TABLET ORAL
Qty: 30 TABLET | Refills: 1 | Status: SHIPPED | OUTPATIENT
Start: 2021-11-22 | End: 2021-12-25

## 2021-12-25 DIAGNOSIS — I10 ESSENTIAL HYPERTENSION: ICD-10-CM

## 2021-12-25 RX ORDER — LOSARTAN POTASSIUM 50 MG/1
TABLET ORAL
Qty: 30 TABLET | Refills: 1 | Status: SHIPPED | OUTPATIENT
Start: 2021-12-25 | End: 2022-01-20

## 2022-01-06 ENCOUNTER — OFFICE VISIT (OUTPATIENT)
Dept: RHEUMATOLOGY | Age: 42
End: 2022-01-06
Payer: COMMERCIAL

## 2022-01-06 VITALS
RESPIRATION RATE: 18 BRPM | WEIGHT: 236 LBS | HEIGHT: 66 IN | DIASTOLIC BLOOD PRESSURE: 87 MMHG | BODY MASS INDEX: 37.93 KG/M2 | TEMPERATURE: 98 F | HEART RATE: 85 BPM | SYSTOLIC BLOOD PRESSURE: 124 MMHG | OXYGEN SATURATION: 98 %

## 2022-01-06 DIAGNOSIS — Z79.60 LONG-TERM USE OF IMMUNOSUPPRESSANT MEDICATION: ICD-10-CM

## 2022-01-06 DIAGNOSIS — M06.09 SERONEGATIVE RHEUMATOID ARTHRITIS OF MULTIPLE SITES (HCC): Primary | ICD-10-CM

## 2022-01-06 PROCEDURE — 99215 OFFICE O/P EST HI 40 MIN: CPT | Performed by: INTERNAL MEDICINE

## 2022-01-06 NOTE — PROGRESS NOTES
REASON FOR VISIT:    is a 37 y.o. male with history of eczema who is returning for new Rheumatology provider followup of seronegative rheumatoid arthritis. Inflammatory arthritis phenotype includes:  Anti-CCP positive: no  Rheumatoid factor positive: no  Erosive disease: no  Extra-articular manifestations include: none  Relevant comorbidities include: Eczema     Immunosuppression Screening (2021): Quantiferon TB: negative  PPD:  Not performed  Hepatitis B: negative  Hepatitis C: negative     Therapy History includes:  Current DMARD therapy include: Rinvoq (-present)  Prior DMARD therapy include: methotrexate 20 mg every Thursday (2021 to 2021), Joanne Nano 11 mg XR daily (2021; SAMPLE, nonpreferred by insurance)  Discontinued DMARDs because of inefficacy: None   Discontinued DMARDs because of side effects: methotrexate (short-term memory loss, intermittent dizziness and nausea)    HISTORY OF PRESENT ILLNESS    Taking Rinvoq since last visit with Dr. Jae Shirley in September, \"huge\" initial improvement and generally has maintained good joint control since. Hips feel gritty and lock for 45 minutes some mornings, but since starting Rinvoq this is mild. Had a flare [place] he attributes to rapid onset of warm weather. Hands are the worst during flares. Has longerstanding low back pain which can also flare with changes in weather. Stays active at home now, caring for 3 children including . 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 No interval infections. No problems with persistent eye redness or pain. No cough or dyspnea on exertion. REVIEW OF SYSTEMS  A comprehensive review of systems was negative except for that written in the HPI. A 10-point review of systems is per the new patient questionnaire, which has been reviewed extensively and scanned into the electronic medical record for future reference.   Review of systems is as above and is otherwise negative. ALLERGIES  Patient has no known allergies. MEDICATIONS  Current Outpatient Medications   Medication Sig    losartan (COZAAR) 50 mg tablet TAKE 1 TABLET BY MOUTH EVERY DAY    buPROPion XL (WELLBUTRIN XL) 150 mg tablet Take 1 Tablet by mouth every morning.  cyclobenzaprine (FLEXERIL) 10 mg tablet TAKE 1 TABLET BY MOUTH THREE TIMES A DAY AS NEEDED FOR MUSCLE SPASMS    buPROPion XL (WELLBUTRIN XL) 300 mg XL tablet TAKE 1 TABLET BY MOUTH EVERY MORNING    crisaborole (Eucrisa) 2 % oint 1 Dose by Apply Externally route two (2) times daily as needed (eczema).  upadacitinib (Rinvoq) 15 mg Tb24 Take 15 mg by mouth daily.  ondansetron (ZOFRAN ODT) 4 mg disintegrating tablet TAKE 1 TABLET BY MOUTH EVERY EIGHT HOURS AS NEEDED FOR NAUSEA OR VOMITING.  acetaminophen (Tylenol Extra Strength) 500 mg tablet Take 500 mg by mouth every six (6) hours as needed for Pain.  citalopram (CELEXA) 20 mg tablet Take 1 Tab by mouth daily.  tofacitinib 11 mg Tb24 Take 11 mg by mouth daily. Indications: rheumatoid arthritis (Patient not taking: Reported on 1/6/2022)    tofacitinib 11 mg Tb24 Take 11 mg by mouth daily. Indications: rheumatoid arthritis (Patient not taking: Reported on 1/6/2022)    triamcinolone acetonide (KENALOG) 0.1 % topical cream Apply  to affected area two (2) times a day. use thin layer (Patient not taking: Reported on 1/6/2022)     No current facility-administered medications for this visit. PAST MEDICAL HISTORY  Past Medical History:   Diagnosis Date    Anxiety disorder     Arthritis     back    Depression     Eczema     Obesity, Class II, BMI 35-39.9     Tendonitis     left wrist       FAMILY HISTORY  family history includes Alcohol abuse in his mother; Depression in his mother; Diabetes in his father; Heart Disease in his father; Hypertension in his father; Psychiatric Disorder in his mother.     SOCIAL HISTORY  He  reports that he quit smoking about 4 years ago. He quit after 15.00 years of use. He has never used smokeless tobacco. He reports current alcohol use of about 2.0 - 3.0 standard drinks of alcohol per week. He reports current drug use. Drug: Marijuana. Social History     Social History Narrative    Not on file   Plays guitar, piano, and bass. Currently just working with [employer] due to time limitations. Previously primarily played blues      DATA  Visit Vitals  /87 (BP 1 Location: Left upper arm, BP Patient Position: Sitting)   Pulse 85   Temp 98 °F (36.7 °C) (Oral)   Resp 18   Ht 5' 6\" (1.676 m)   Wt 236 lb (107 kg)   SpO2 98%   BMI 38.09 kg/m²    Body mass index is 38.09 kg/m². No flowsheet data found. mHAQ 1.0  Patient pain: 40  Patient global 30  MD global: 20  General:  The patient is well developed, well nourished, alert, and in no apparent distress. Eyes: Sclera are anicteric. No conjunctival injection. HEENT:  Oropharynx is clear. No oral ulcers. Adequate salivary pooling. No cervical or supraclavicular lymphadenopathy. Lungs:  Clear to auscultation bilaterally, without wheeze or stridor. Normal respiratory effort. Cor:  Regular rate and rhythm. No murmur rub or gallop. Abdomen: Soft, non-tender, without hepatomegaly or masses. Extremities: No calf tenderness or edema. Warm and well perfused. Skin: Mild midfacial and forearm erythema c/w eczema. No ashlee psoriaform plaques, no palmar or sole desquamation/rash. No nail pitting. Neuro: Nonfocal, normal unassisted gait. Negative seated SLR. Musculoskeletal:    A comprehensive musculoskeletal exam was performed for all joints of each upper and lower extremity and assessed for swelling, tenderness and range of motion. Results are documented as below:  Tenderness in the MCPs, PIPs, and right great toe as below. No overt synovitis in the small joints of the hands, wrists, shoulders, elbows, hips, knees or ankles.          Labs:  7/22/21 WBC 6.2, Hgb 15.8, Plt 326; ESR 19, Cr 1.05, LFTs WNL, CRP 6mg/L, QFN gold negative  21: Hep C Ab neg; C3 WNL, C4 high, ALESSANDRA IFA, RF <10, Lyme IRAJ neg    Imagin21 XR R hand: No erosions, ossicle adjacent to ulnar styloid    13 MR Lspine without: Minimal degenerative changes. There are no spinal canal or foraminal stenoses. [facility]  Mr. Lidia Irene is a 37 y.o. male who presents for evaluation of seronegative rheumatoid arthritis, with well-controlled disease currently on Rinvoq monotherapy. Moderate disease activity by CDAI but driven primarily by osteoarthritis, updating toxicity labs for Rinvoq and making no changes today. 1. Seronegative rheumatoid arthritis of multiple sites ([facility]  - Cont Rinvoq daily  - C REACTIVE PROTEIN, QT; Future  - CBC WITH AUTOMATED DIFF; Future  - METABOLIC PANEL, COMPREHENSIVE; Future  - SED RATE (ESR); Future    2. Long-term use of immunosuppressant medication  - CBC WITH AUTOMATED DIFF; Future  - METABOLIC PANEL, COMPREHENSIVE; Future      Orders Placed This Encounter    C REACTIVE PROTEIN, QT    CBC WITH AUTOMATED DIFF    METABOLIC PANEL, COMPREHENSIVE    SED RATE (ESR)       Medications: I have discontinued Teresa Bonmarie. Lidia Irene \"Marcus\"'s Eucrisa. I am also having him maintain his triamcinolone acetonide, citalopram, acetaminophen, ondansetron, Rinvoq, buPROPion XL, buPROPion XL, and losartan. Follow up: Return in about 3 months (around 2022).     Face to face time: 35 minutes  Note preparation and records review day of service: 15 minutes  Total provider time day of service: Catherine Savage MD    Adult Rheumatology   [address] [facility] [address]  [facility] [place], [address]   Phone [phone]  Fax [phone]

## 2022-01-06 NOTE — PATIENT INSTRUCTIONS
1. Continue Rinvoq daily. 2. Labs ASAP. 3. Let me know if worsened rashes, or persistent     4. Return in 3 months.

## 2022-01-06 NOTE — PROGRESS NOTES
Chief Complaint   Patient presents with    Arthritis     hands     1. Have you been to the ER, urgent care clinic since your last visit? Hospitalized since your last visit? No    2. Have you seen or consulted any other health care providers outside of the [address] [place] [address] since your last visit? Include any pap smears or colon screening.  No

## 2022-01-11 DIAGNOSIS — G89.29 CHRONIC MIDLINE LOW BACK PAIN WITH LEFT-SIDED SCIATICA: ICD-10-CM

## 2022-01-11 DIAGNOSIS — M54.42 CHRONIC MIDLINE LOW BACK PAIN WITH LEFT-SIDED SCIATICA: ICD-10-CM

## 2022-01-11 RX ORDER — CYCLOBENZAPRINE HCL 10 MG
TABLET ORAL
Qty: 60 TABLET | Refills: 2 | Status: SHIPPED | OUTPATIENT
Start: 2022-01-11 | End: 2022-04-05

## 2022-01-14 ENCOUNTER — DOCUMENTATION ONLY (OUTPATIENT)
Dept: PHARMACY | Age: 42
End: 2022-01-14

## 2022-01-17 RX ORDER — UPADACITINIB 15 MG/1
15 TABLET, EXTENDED RELEASE ORAL DAILY
Qty: 30 EACH | Refills: 11 | Status: SHIPPED | OUTPATIENT
Start: 2022-01-17 | End: 2022-05-07 | Stop reason: SDDI

## 2022-01-20 DIAGNOSIS — I10 ESSENTIAL HYPERTENSION: ICD-10-CM

## 2022-01-20 RX ORDER — LOSARTAN POTASSIUM 50 MG/1
TABLET ORAL
Qty: 30 TABLET | Refills: 1 | Status: SHIPPED | OUTPATIENT
Start: 2022-01-20 | End: 2022-02-21

## 2022-02-21 DIAGNOSIS — I10 ESSENTIAL HYPERTENSION: ICD-10-CM

## 2022-02-21 RX ORDER — LOSARTAN POTASSIUM 50 MG/1
TABLET ORAL
Qty: 30 TABLET | Refills: 1 | Status: SHIPPED | OUTPATIENT
Start: 2022-02-21 | End: 2022-03-21

## 2022-03-04 ENCOUNTER — TELEPHONE (OUTPATIENT)
Dept: RHEUMATOLOGY | Age: 42
End: 2022-03-04

## 2022-03-04 NOTE — TELEPHONE ENCOUNTER
Called patient left voice message patient has an upcoming appt on 4/7/2022 that needs to be reschedule due to a provider cancellation please reschedule appt ANYTIME. sdh

## 2022-03-04 NOTE — TELEPHONE ENCOUNTER
Called patient left voice message patient has an upcoming appt on 4/7/2022 that needs to be reschedule due to a provider cancellation please reschedule appt ANYTIME sdh

## 2022-03-08 RX ORDER — BUPROPION HYDROCHLORIDE 150 MG/1
150 TABLET ORAL
Qty: 90 TABLET | Refills: 4 | Status: SHIPPED | OUTPATIENT
Start: 2022-03-08

## 2022-03-18 PROBLEM — Z87.19 S/P LAPAROSCOPIC HERNIA REPAIR: Status: ACTIVE | Noted: 2021-01-20

## 2022-03-18 PROBLEM — F33.9 RECURRENT DEPRESSION (HCC): Status: ACTIVE | Noted: 2018-06-04

## 2022-03-18 PROBLEM — Z98.890 S/P LAPAROSCOPIC HERNIA REPAIR: Status: ACTIVE | Noted: 2021-01-20

## 2022-03-19 PROBLEM — F41.9 ANXIETY: Status: ACTIVE | Noted: 2018-06-04

## 2022-03-19 PROBLEM — E66.01 OBESITY, MORBID (HCC): Status: ACTIVE | Noted: 2018-06-04

## 2022-03-21 DIAGNOSIS — I10 ESSENTIAL HYPERTENSION: ICD-10-CM

## 2022-03-21 RX ORDER — LOSARTAN POTASSIUM 50 MG/1
TABLET ORAL
Qty: 30 TABLET | Refills: 1 | Status: SHIPPED | OUTPATIENT
Start: 2022-03-21 | End: 2022-04-18

## 2022-04-04 ENCOUNTER — TELEPHONE (OUTPATIENT)
Dept: RHEUMATOLOGY | Age: 42
End: 2022-04-04

## 2022-04-04 DIAGNOSIS — G89.29 CHRONIC MIDLINE LOW BACK PAIN WITH LEFT-SIDED SCIATICA: ICD-10-CM

## 2022-04-04 DIAGNOSIS — M54.42 CHRONIC MIDLINE LOW BACK PAIN WITH LEFT-SIDED SCIATICA: ICD-10-CM

## 2022-04-04 NOTE — TELEPHONE ENCOUNTER
Call patient left voice message to have patient to call back into the office to reschedule appt for 4/7/2022 that had to be cancel due to a provider cancellation a  date and time was provided for 5/7/2022 per supervisor.  sdh

## 2022-04-04 NOTE — TELEPHONE ENCOUNTER
Call patient 2nd attempt spoke with pt wife explain appt on 4/7/2022 had to be cancel due to a provider cancellation and reschedule advise would call back with a reschedule date and time forward information to supervisor.  sdh

## 2022-04-05 RX ORDER — BUPROPION HYDROCHLORIDE 300 MG/1
TABLET ORAL
Qty: 90 TABLET | Refills: 1 | Status: SHIPPED | OUTPATIENT
Start: 2022-04-05

## 2022-04-05 RX ORDER — CYCLOBENZAPRINE HCL 10 MG
TABLET ORAL
Qty: 60 TABLET | Refills: 2 | Status: SHIPPED | OUTPATIENT
Start: 2022-04-05 | End: 2022-06-07

## 2022-04-15 DIAGNOSIS — I10 ESSENTIAL HYPERTENSION: ICD-10-CM

## 2022-04-18 RX ORDER — LOSARTAN POTASSIUM 50 MG/1
TABLET ORAL
Qty: 30 TABLET | Refills: 1 | Status: SHIPPED | OUTPATIENT
Start: 2022-04-18 | End: 2022-05-16

## 2022-06-07 DIAGNOSIS — M54.42 CHRONIC MIDLINE LOW BACK PAIN WITH LEFT-SIDED SCIATICA: ICD-10-CM

## 2022-06-07 DIAGNOSIS — G89.29 CHRONIC MIDLINE LOW BACK PAIN WITH LEFT-SIDED SCIATICA: ICD-10-CM

## 2022-06-07 RX ORDER — CYCLOBENZAPRINE HCL 10 MG
TABLET ORAL
Qty: 60 TABLET | Refills: 2 | Status: SHIPPED | OUTPATIENT
Start: 2022-06-07 | End: 2022-09-29 | Stop reason: SDUPTHER

## 2022-06-07 NOTE — TELEPHONE ENCOUNTER
Called and spoke with wife Zelda Mendes (on HIPAA). Advised that he needs to be seen for further refills. Wife verbalized understanding and will let him know.

## 2022-06-12 LAB
ALBUMIN SERPL-MCNC: 5 G/DL (ref 4–5)
ALBUMIN/GLOB SERPL: 2 {RATIO} (ref 1.2–2.2)
ALP SERPL-CCNC: 59 IU/L (ref 44–121)
ALT SERPL-CCNC: 22 IU/L (ref 0–44)
AST SERPL-CCNC: 16 IU/L (ref 0–40)
BASOPHILS # BLD AUTO: 0 X10E3/UL (ref 0–0.2)
BASOPHILS NFR BLD AUTO: 1 %
BILIRUB SERPL-MCNC: 1 MG/DL (ref 0–1.2)
BUN SERPL-MCNC: 15 MG/DL (ref 6–24)
BUN/CREAT SERPL: 15 (ref 9–20)
CALCIUM SERPL-MCNC: 9.8 MG/DL (ref 8.7–10.2)
CHLORIDE SERPL-SCNC: 102 MMOL/L (ref 96–106)
CO2 SERPL-SCNC: 24 MMOL/L (ref 20–29)
CREAT SERPL-MCNC: 1.03 MG/DL (ref 0.76–1.27)
CRP SERPL-MCNC: 7 MG/L (ref 0–10)
EGFR: 94 ML/MIN/1.73
EOSINOPHIL # BLD AUTO: 0.1 X10E3/UL (ref 0–0.4)
EOSINOPHIL NFR BLD AUTO: 1 %
ERYTHROCYTE [DISTWIDTH] IN BLOOD BY AUTOMATED COUNT: 11.8 % (ref 11.6–15.4)
ERYTHROCYTE [SEDIMENTATION RATE] IN BLOOD BY WESTERGREN METHOD: 2 MM/HR (ref 0–15)
GLOBULIN SER CALC-MCNC: 2.5 G/DL (ref 1.5–4.5)
GLUCOSE SERPL-MCNC: 105 MG/DL (ref 65–99)
HCT VFR BLD AUTO: 48 % (ref 37.5–51)
HGB BLD-MCNC: 16.3 G/DL (ref 13–17.7)
IMM GRANULOCYTES # BLD AUTO: 0 X10E3/UL (ref 0–0.1)
IMM GRANULOCYTES NFR BLD AUTO: 0 %
LYMPHOCYTES # BLD AUTO: 1.8 X10E3/UL (ref 0.7–3.1)
LYMPHOCYTES NFR BLD AUTO: 23 %
MCH RBC QN AUTO: 30.6 PG (ref 26.6–33)
MCHC RBC AUTO-ENTMCNC: 34 G/DL (ref 31.5–35.7)
MCV RBC AUTO: 90 FL (ref 79–97)
MONOCYTES # BLD AUTO: 0.6 X10E3/UL (ref 0.1–0.9)
MONOCYTES NFR BLD AUTO: 8 %
NEUTROPHILS # BLD AUTO: 5.1 X10E3/UL (ref 1.4–7)
NEUTROPHILS NFR BLD AUTO: 67 %
PLATELET # BLD AUTO: 283 X10E3/UL (ref 150–450)
POTASSIUM SERPL-SCNC: 4.6 MMOL/L (ref 3.5–5.2)
PROT SERPL-MCNC: 7.5 G/DL (ref 6–8.5)
RBC # BLD AUTO: 5.33 X10E6/UL (ref 4.14–5.8)
SODIUM SERPL-SCNC: 142 MMOL/L (ref 134–144)
WBC # BLD AUTO: 7.6 X10E3/UL (ref 3.4–10.8)

## 2022-06-13 DIAGNOSIS — I10 ESSENTIAL HYPERTENSION: ICD-10-CM

## 2022-06-14 RX ORDER — LOSARTAN POTASSIUM 50 MG/1
TABLET ORAL
Qty: 30 TABLET | Refills: 1 | Status: SHIPPED | OUTPATIENT
Start: 2022-06-14

## 2022-06-23 ENCOUNTER — OFFICE VISIT (OUTPATIENT)
Dept: RHEUMATOLOGY | Age: 42
End: 2022-06-23
Payer: COMMERCIAL

## 2022-06-23 VITALS
TEMPERATURE: 99.3 F | DIASTOLIC BLOOD PRESSURE: 89 MMHG | RESPIRATION RATE: 18 BRPM | OXYGEN SATURATION: 98 % | HEART RATE: 95 BPM | WEIGHT: 240 LBS | HEIGHT: 66 IN | BODY MASS INDEX: 38.57 KG/M2 | SYSTOLIC BLOOD PRESSURE: 133 MMHG

## 2022-06-23 DIAGNOSIS — M06.09 SERONEGATIVE RHEUMATOID ARTHRITIS OF MULTIPLE SITES (HCC): Primary | ICD-10-CM

## 2022-06-23 DIAGNOSIS — Z79.60 LONG-TERM USE OF IMMUNOSUPPRESSANT MEDICATION: ICD-10-CM

## 2022-06-23 DIAGNOSIS — E78.49 OTHER HYPERLIPIDEMIA: ICD-10-CM

## 2022-06-23 PROCEDURE — 99214 OFFICE O/P EST MOD 30 MIN: CPT | Performed by: INTERNAL MEDICINE

## 2022-06-23 RX ORDER — UPADACITINIB 15 MG/1
15 TABLET, EXTENDED RELEASE ORAL DAILY
Qty: 90 EACH | Refills: 1 | Status: SHIPPED | OUTPATIENT
Start: 2022-06-23

## 2022-06-23 NOTE — PROGRESS NOTES
REASON FOR VISIT:    is a 37 y.o. male with history of eczema who is returning for new Rheumatology provider followup of seronegative rheumatoid arthritis. Inflammatory arthritis phenotype includes:  Anti-CCP positive: no  Rheumatoid factor positive: no  Erosive disease: no  Extra-articular manifestations include: none  Relevant comorbidities include: Eczema     Immunosuppression Screening (7/22/2021): Quantiferon TB: negative  PPD:  Not performed  Hepatitis B: negative  Hepatitis C: negative     Therapy History includes:  Current DMARD therapy include: Rinvoq (9/21-present)  Prior DMARD therapy include: methotrexate 20 mg every Thursday (7/01/2021 to 9/08/2021), Enolia Tam 11 mg XR daily (9/08/2021; SAMPLE, nonpreferred by insurance)  Discontinued DMARDs because of inefficacy: None   Discontinued DMARDs because of side effects: methotrexate (short-term memory loss, intermittent dizziness and nausea)    HISTORY OF PRESENT ILLNESS    Pt returns for a follow up. Pt ran out of Rinvoq 2 months ago. He has not been taking it since then, and he says that he has been having mainly good day though he does say that he has been a lot more fatigued after stopping it and has noticed more global stiffness now for up to 1-2 hours each morning. In terms of joint pain, his hands and hips have been the worst. He said that last week his hands were so bad that he could barely hold anything. His eczema was completely gone when he was on Rinvoq, but it has came back since he stopped taking it. Pt notes that his hands give him the most pain recently. 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 He has not had to stop playing the guitar since he has been off of RInvoq. He recalls that when he was off of Rinvoq in the past he felt his symptoms go away pretty quickly after restarting it again. REVIEW OF SYSTEMS  A comprehensive review of systems was negative except for that written in the HPI.   A 10-point review of systems is per the new patient questionnaire, which has been reviewed extensively and scanned into the electronic medical record for future reference. Review of systems is as above and is otherwise negative. ALLERGIES  Patient has no known allergies. MEDICATIONS  Current Outpatient Medications   Medication Sig    losartan (COZAAR) 50 mg tablet TAKE 1 TABLET BY MOUTH EVERY DAY    cyclobenzaprine (FLEXERIL) 10 mg tablet TAKE 1 TABLET BY MOUTH THREE TIMES A DAY AS NEEDED FOR MUSCLE SPASMS    buPROPion XL (WELLBUTRIN XL) 300 mg XL tablet TAKE 1 TABLET BY MOUTH EVERY DAY IN THE MORNING    buPROPion XL (WELLBUTRIN XL) 150 mg tablet Take 1 Tablet by mouth every morning.  ondansetron (ZOFRAN ODT) 4 mg disintegrating tablet TAKE 1 TABLET BY MOUTH EVERY EIGHT HOURS AS NEEDED FOR NAUSEA OR VOMITING.  acetaminophen (Tylenol Extra Strength) 500 mg tablet Take 500 mg by mouth every six (6) hours as needed for Pain.  citalopram (CELEXA) 20 mg tablet Take 1 Tab by mouth daily.  triamcinolone acetonide (KENALOG) 0.1 % topical cream Apply  to affected area two (2) times a day. use thin layer     No current facility-administered medications for this visit. PAST MEDICAL HISTORY  Past Medical History:   Diagnosis Date    Anxiety disorder     Arthritis     back    Depression     Eczema     Obesity, Class II, BMI 35-39.9     Tendonitis     left wrist       FAMILY HISTORY  family history includes Alcohol abuse in his mother; Depression in his mother; Diabetes in his father; Heart Disease in his father; Hypertension in his father; Psychiatric Disorder in his mother. SOCIAL HISTORY  He  reports that he quit smoking about 4 years ago. He quit after 15.00 years of use. He has never used smokeless tobacco. He reports current alcohol use of about 2.0 - 3.0 standard drinks of alcohol per week. He reports current drug use. Drug: Marijuana.   Social History     Social History Narrative    Not on file   Plays guitar, piano, and bass. Currently just working with [employer] due to time limitations. Previously primarily played blues      DATA  Visit Vitals  /89 (BP 1 Location: Left upper arm, BP Patient Position: Sitting)   Pulse 95   Temp 99.3 °F (37.4 °C) (Oral)   Resp 18   Ht 5' 6\" (1.676 m)   Wt 240 lb (108.9 kg)   SpO2 98%   BMI 38.74 kg/m²    Body mass index is 38.74 kg/m². No flowsheet data found. General:  The patient is well developed, well nourished, alert, and in no apparent distress. Eyes: Sclera are anicteric. No conjunctival injection. HEENT:  Oropharynx is clear. No oral ulcers. Adequate salivary pooling. No cervical or supraclavicular lymphadenopathy. Lungs:  Clear to auscultation bilaterally, without wheeze or stridor. Normal respiratory effort. Cor:  Regular rate and rhythm. No murmur rub or gallop. Abdomen: Soft, non-tender, without hepatomegaly or masses. Extremities: No calf tenderness or edema. Warm and well perfused. Skin:  Eczematous erythema in mid chest and mid back and more sparse upper arm involvement. Neuro: Nonfocal  Musculoskeletal:    A comprehensive musculoskeletal exam was performed for all joints of each upper and lower extremity and assessed for swelling, tenderness and range of motion. Results are documented as below: L MCP 3 and 4 tenderness. Tenderness in L PIP 2-4. No evidence of synovitis in the small joints of the hands, wrists, shoulders, elbows, hips, knees or ankles. Labs:  22: ESR 2, Cr 1.03, LFT WNL, WBC 7.6, HGB 16.3, Plt 283, CRP 7 mg/L  21 WBC 6.2, Hgb 15.8, Plt 326; ESR 19, Cr 1.05, LFTs WNL, CRP 6mg/L, QFN gold negative  21: Hep C Ab neg; C3 WNL, C4 high, ALESSANDRA IFA, RF <10, Lyme IRAJ neg    Imagin21 XR R hand: No erosions, ossicle adjacent to ulnar styloid    13 MR Lspine without: Minimal degenerative changes. There are no spinal canal or foraminal stenoses. Sherry Talavera  Mr. Leilani Calderon is a 37 y.o. male who presents for evaluation of seronegative rheumatoid arthritis, with interval flare of arthritis particularly in the left hand and worsened eczema since running out of Rinvoq. He is aware of the cardiovascular and thromboembolism risks associated with Rinvoq and would like to restart this ASAP. 1. Seronegative rheumatoid arthritis of multiple sites ([facility]  - upadacitinib (Rinvoq) 15 mg Tb24; Take 15 mg by mouth daily. Indications: rheumatoid arthritis  Dispense: 90 Each; Refill: 1  - C REACTIVE PROTEIN, QT; Future  - CBC WITH AUTOMATED DIFF; Future  - METABOLIC PANEL, COMPREHENSIVE; Future  - SED RATE (ESR); Future  - LIPID PANEL; Future    2. Long-term use of immunosuppressant medication  - upadacitinib (Rinvoq) 15 mg Tb24; Take 15 mg by mouth daily. Indications: rheumatoid arthritis  Dispense: 90 Each; Refill: 1  - CBC WITH AUTOMATED DIFF; Future  - METABOLIC PANEL, COMPREHENSIVE; Future    3. Other hyperlipidemia  - LIPID PANEL; Future    Patient Instructions   1) Start to take one pill of Rinvoq daily again. 2) Update fasted labs in 3 months. 3) Follow up in 3-4 months. Let me know if you run into any problems with infections or have any worsening of symptoms in the meantime. Orders Placed This Encounter    C REACTIVE PROTEIN, QT    CBC WITH AUTOMATED DIFF    METABOLIC PANEL, COMPREHENSIVE    SED RATE (ESR)    LIPID PANEL    upadacitinib (Rinvoq) 15 mg Tb24       Medications: I am having Deanna Bennett. Kendala Boot \"Marcus\" start on Rinvoq. I am also having him maintain his triamcinolone acetonide, citalopram, acetaminophen, ondansetron, buPROPion XL, buPROPion XL, cyclobenzaprine, and losartan. Follow up: Return in about 4 months (around 10/23/2022).     Face to face time: 20 minutes  Note preparation and records review day of service: 15 minutes  Total provider time day of service: 35 minutes    This was scribed by Ceasar Becerra in the presence of Dr. Lisa Mcelroy MD    Adult Rheumatology   [facility]  A Part of [facility], [address]   Phone [phone]  Fax [phone]

## 2022-06-23 NOTE — PATIENT INSTRUCTIONS
1) Start to take one pill of Rinvoq daily again. 2) Update fasted labs in 3 months. 3) Follow up in 3-4 months. Let me know if you run into any problems with infections or have any worsening of symptoms in the meantime.

## 2022-06-23 NOTE — PROGRESS NOTES
Chief Complaint   Patient presents with    Arthritis    Joint Pain     hands, hips     1. Have you been to the ER, urgent care clinic since your last visit? Hospitalized since your last visit? No    2. Have you seen or consulted any other health care providers outside of the [address] [place] [address] since your last visit? Include any pap smears or colon screening.  No

## 2022-09-27 ENCOUNTER — VIRTUAL VISIT (OUTPATIENT)
Dept: FAMILY MEDICINE CLINIC | Age: 42
End: 2022-09-27
Payer: COMMERCIAL

## 2022-09-27 DIAGNOSIS — F41.9 ANXIETY: Primary | ICD-10-CM

## 2022-09-27 DIAGNOSIS — Z13.31 POSITIVE DEPRESSION SCREENING: ICD-10-CM

## 2022-09-27 PROCEDURE — 99214 OFFICE O/P EST MOD 30 MIN: CPT | Performed by: FAMILY MEDICINE

## 2022-09-27 RX ORDER — CITALOPRAM 40 MG/1
40 TABLET, FILM COATED ORAL DAILY
Qty: 90 TABLET | Refills: 0 | Status: SHIPPED | OUTPATIENT
Start: 2022-09-27

## 2022-09-27 NOTE — PROGRESS NOTES
Progress Note    he is a 37y.o. year old male who presents for evalution. Subjective:     Pt having severe anxiety, taking wellbutrin 450 and celexa 20. States he us extremely stressed out, diff performing his job. Put on performance improvement plan. He is having sig anxiety related to his job directly. Feeling very stressed with customers on the phone. Unable to perform the functions of his job at this time as required by the job      Reviewed PmHx, RxHx, FmHx, SocHx, AllgHx and updated and dated in the chart. Review of Systems - negative except as listed above in the HPI    Objective: There were no vitals filed for this visit. Current Outpatient Medications   Medication Sig    citalopram (CELEXA) 40 mg tablet Take 1 Tablet by mouth daily. buPROPion XL (WELLBUTRIN XL) 300 mg XL tablet TAKE 1 TABLET BY MOUTH EVERY DAY IN THE MORNING    buPROPion XL (WELLBUTRIN XL) 150 mg tablet Take 1 Tablet by mouth every morning. upadacitinib (Rinvoq) 15 mg Tb24 Take 15 mg by mouth daily. Indications: rheumatoid arthritis    losartan (COZAAR) 50 mg tablet TAKE 1 TABLET BY MOUTH EVERY DAY    cyclobenzaprine (FLEXERIL) 10 mg tablet TAKE 1 TABLET BY MOUTH THREE TIMES A DAY AS NEEDED FOR MUSCLE SPASMS    ondansetron (ZOFRAN ODT) 4 mg disintegrating tablet TAKE 1 TABLET BY MOUTH EVERY EIGHT HOURS AS NEEDED FOR NAUSEA OR VOMITING. acetaminophen (Tylenol Extra Strength) 500 mg tablet Take 500 mg by mouth every six (6) hours as needed for Pain.    triamcinolone acetonide (KENALOG) 0.1 % topical cream Apply  to affected area two (2) times a day. use thin layer     No current facility-administered medications for this visit. Physical Examination: General appearance - alert, well appearing, and in no distress and anxious  Mental status - alert, oriented to person, place, and time, depressed mood, anxious      Assessment/ Plan:   Diagnoses and all orders for this visit:    1.  Anxiety  -     citalopram (CELEXA) 40 mg tablet; Take 1 Tablet by mouth daily. Exacerbation of anxiety/ depression. Will complete STD Sep 28- Oct 25 plan to RTW on the 26th.    2. Positive depression screening   Increase celexa to 40  Follow-up and Dispositions    Return in about 2 weeks (around 10/11/2022), or if symptoms worsen or fail to improve. I have discussed the diagnosis with the patient and the intended plan as seen in the above orders. The patient has received an after-visit summary and questions were answered concerning future plans. Pt conveyed understanding of plan. Medication Side Effects and Warnings were discussed with patient      Rolo Fam is being evaluated by a Virtual Visit (video visit) encounter to address concerns as mentioned above. A caregiver was present when appropriate. Due to this being a TeleHealth encounter ([facility]), evaluation of the following organ systems was limited: Vitals/Constitutional/EENT/Resp/CV/GI//MS/Neuro/Skin/Heme-Lymph-Imm. Pursuant to the emergency declaration under the [address] [place] [address] authority and the [employer] Act, this Virtual Visit was conducted with patient's (and/or legal guardian's) consent, to reduce the patient's risk of exposure to COVID-19 and provide necessary medical care. The patient (and/or legal guardian) has also been advised to contact this office for worsening conditions or problems, and seek emergency medical treatment and/or call 911 if deemed necessary. Patient identification was verified at the start of the visit: Yes    Services were provided through a video synchronous discussion virtually to substitute for in-person clinic visit. Patient and provider were located at their individual homes.   --Mary Wharton DO on 9/27/2022 at 1:54 PM

## 2022-09-27 NOTE — PATIENT INSTRUCTIONS
A Healthy Lifestyle: Care Instructions  Your Care Instructions     A healthy lifestyle can help you feel good, stay at a healthy weight, and have plenty of energy for both work and play. A healthy lifestyle is something you can share with your whole family. A healthy lifestyle also can lower your risk for serious health problems, such as high blood pressure, heart disease, and diabetes. You can follow a few steps listed below to improve your health and the health of your family. Follow-up care is a key part of your treatment and safety. Be sure to make and go to all appointments, and call your doctor if you are having problems. It's also a good idea to know your test results and keep a list of the medicines you take. How can you care for yourself at home? Do not eat too much sugar, fat, or fast foods. You can still have dessert and treats now and then. The goal is moderation. Start small to improve your eating habits. Pay attention to portion sizes, drink less juice and soda pop, and eat more fruits and vegetables. Eat a healthy amount of food. A 3-ounce serving of meat, for example, is about the size of a deck of cards. Fill the rest of your plate with vegetables and whole grains. Limit the amount of soda and sports drinks you have every day. Drink more water when you are thirsty. Eat plenty of fruits and vegetables every day. Have an apple or some carrot sticks as an afternoon snack instead of a candy bar. Try to have fruits and/or vegetables at every meal.  Make exercise part of your daily routine. You may want to start with simple activities, such as walking, bicycling, or slow swimming. Try to be active 30 to 60 minutes every day. You do not need to do all 30 to 60 minutes all at once. For example, you can exercise 3 times a day for 10 or 20 minutes. Moderate exercise is safe for most people, but it is always a good idea to talk to your doctor before starting an exercise program.  Keep moving.  Daria Davis the lawn, work in the garden, or clean your house. Take the stairs instead of the elevator at work. If you smoke, quit. People who smoke have an increased risk for heart attack, stroke, cancer, and other lung illnesses. Quitting is hard, but there are ways to boost your chance of quitting tobacco for good. Use nicotine gum, patches, or lozenges. Ask your doctor about stop-smoking programs and medicines. Keep trying. In addition to reducing your risk of diseases in the future, you will notice some benefits soon after you stop using tobacco. If you have shortness of breath or asthma symptoms, they will likely get better within a few weeks after you quit. Limit how much alcohol you drink. Moderate amounts of alcohol (up to 2 drinks a day for men, 1 drink a day for women) are okay. But drinking too much can lead to liver problems, high blood pressure, and other health problems. Family health  If you have a family, there are many things you can do together to improve your health. Eat meals together as a family as often as possible. Eat healthy foods. This includes fruits, vegetables, lean meats and dairy, and whole grains. Include your family in your fitness plan. Most people think of activities such as jogging or tennis as the way to fitness, but there are many ways you and your family can be more active. Anything that makes you breathe hard and gets your heart pumping is exercise. Here are some tips:  Walk to do errands or to take your child to school or the bus. Go for a family bike ride after dinner instead of watching TV. Where can you learn more? Go to http://www.gray.com/  Enter O613 in the search box to learn more about \"A Healthy Lifestyle: Care Instructions. \"  Current as of: June 16, 2021               Content Version: 13.2  © 5274-9428 Healthwise, Incorporated.    Care instructions adapted under license by [employer] (which disclaims liability or warranty for this information). If you have questions about a medical condition or this instruction, always ask your healthcare professional. [place] [address] any warranty or liability for your use of this information.

## 2022-09-29 DIAGNOSIS — M54.42 CHRONIC MIDLINE LOW BACK PAIN WITH LEFT-SIDED SCIATICA: ICD-10-CM

## 2022-09-29 DIAGNOSIS — G89.29 CHRONIC MIDLINE LOW BACK PAIN WITH LEFT-SIDED SCIATICA: ICD-10-CM

## 2022-09-30 RX ORDER — CYCLOBENZAPRINE HCL 10 MG
TABLET ORAL
Qty: 60 TABLET | Refills: 2 | Status: SHIPPED | OUTPATIENT
Start: 2022-09-30

## 2022-10-07 ENCOUNTER — DOCUMENTATION ONLY (OUTPATIENT)
Dept: FAMILY MEDICINE CLINIC | Age: 42
End: 2022-10-07

## 2022-10-18 ENCOUNTER — VIRTUAL VISIT (OUTPATIENT)
Dept: FAMILY MEDICINE CLINIC | Age: 42
End: 2022-10-18
Payer: COMMERCIAL

## 2022-10-18 DIAGNOSIS — F33.9 RECURRENT DEPRESSION (HCC): Primary | ICD-10-CM

## 2022-10-18 DIAGNOSIS — F41.9 ANXIETY: ICD-10-CM

## 2022-10-18 PROCEDURE — 99213 OFFICE O/P EST LOW 20 MIN: CPT | Performed by: FAMILY MEDICINE

## 2022-10-18 RX ORDER — ARIPIPRAZOLE 5 MG/1
5 TABLET ORAL
Qty: 30 TABLET | Refills: 1 | Status: SHIPPED | OUTPATIENT
Start: 2022-10-18 | End: 2022-11-04

## 2022-10-18 NOTE — PATIENT INSTRUCTIONS
A Healthy Lifestyle: Care Instructions  Your Care Instructions     A healthy lifestyle can help you feel good, stay at a healthy weight, and have plenty of energy for both work and play. A healthy lifestyle is something you can share with your whole family. A healthy lifestyle also can lower your risk for serious health problems, such as high blood pressure, heart disease, and diabetes. You can follow a few steps listed below to improve your health and the health of your family. Follow-up care is a key part of your treatment and safety. Be sure to make and go to all appointments, and call your doctor if you are having problems. It's also a good idea to know your test results and keep a list of the medicines you take. How can you care for yourself at home? Do not eat too much sugar, fat, or fast foods. You can still have dessert and treats now and then. The goal is moderation. Start small to improve your eating habits. Pay attention to portion sizes, drink less juice and soda pop, and eat more fruits and vegetables. Eat a healthy amount of food. A 3-ounce serving of meat, for example, is about the size of a deck of cards. Fill the rest of your plate with vegetables and whole grains. Limit the amount of soda and sports drinks you have every day. Drink more water when you are thirsty. Eat plenty of fruits and vegetables every day. Have an apple or some carrot sticks as an afternoon snack instead of a candy bar. Try to have fruits and/or vegetables at every meal.  Make exercise part of your daily routine. You may want to start with simple activities, such as walking, bicycling, or slow swimming. Try to be active 30 to 60 minutes every day. You do not need to do all 30 to 60 minutes all at once. For example, you can exercise 3 times a day for 10 or 20 minutes. Moderate exercise is safe for most people, but it is always a good idea to talk to your doctor before starting an exercise program.  Keep moving.  Tom Jaramillo the lawn, work in the garden, or clean your house. Take the stairs instead of the elevator at work. If you smoke, quit. People who smoke have an increased risk for heart attack, stroke, cancer, and other lung illnesses. Quitting is hard, but there are ways to boost your chance of quitting tobacco for good. Use nicotine gum, patches, or lozenges. Ask your doctor about stop-smoking programs and medicines. Keep trying. In addition to reducing your risk of diseases in the future, you will notice some benefits soon after you stop using tobacco. If you have shortness of breath or asthma symptoms, they will likely get better within a few weeks after you quit. Limit how much alcohol you drink. Moderate amounts of alcohol (up to 2 drinks a day for men, 1 drink a day for women) are okay. But drinking too much can lead to liver problems, high blood pressure, and other health problems. Family health  If you have a family, there are many things you can do together to improve your health. Eat meals together as a family as often as possible. Eat healthy foods. This includes fruits, vegetables, lean meats and dairy, and whole grains. Include your family in your fitness plan. Most people think of activities such as jogging or tennis as the way to fitness, but there are many ways you and your family can be more active. Anything that makes you breathe hard and gets your heart pumping is exercise. Here are some tips:  Walk to do errands or to take your child to school or the bus. Go for a family bike ride after dinner instead of watching TV. Where can you learn more? Go to http://www.gray.com/  Enter V091 in the search box to learn more about \"A Healthy Lifestyle: Care Instructions. \"  Current as of: June 16, 2021               Content Version: 13.2  © 5395-5347 Healthwise, Incorporated.    Care instructions adapted under license by [employer] (which disclaims liability or warranty for this information). If you have questions about a medical condition or this instruction, always ask your healthcare professional. [place] [address] any warranty or liability for your use of this information.

## 2022-10-18 NOTE — PROGRESS NOTES
Progress Note    he is a 37y.o. year old male who presents for evalution. Subjective:     Patient here for follow-up on his depression anxiety. States he is having significant anxiety. Unfortunately his wife lost her job after he went out on short-term disability. He is currently taking Celexa 40 mg daily. Difficulty with falling asleep and then waking up very early. We will extend his short-term disability with the plan to return to work on 9 November. No suicidal or homicidal ideations. Reviewed PmHx, RxHx, FmHx, SocHx, AllgHx and updated and dated in the chart. Review of Systems - negative except as listed above in the HPI    Objective: There were no vitals filed for this visit. Current Outpatient Medications   Medication Sig    ARIPiprazole (ABILIFY) 5 mg tablet Take 1 Tablet by mouth nightly. cyclobenzaprine (FLEXERIL) 10 mg tablet TAKE 1 TABLET BY MOUTH THREE TIMES A DAY AS NEEDED FOR MUSCLE SPASMS    citalopram (CELEXA) 40 mg tablet Take 1 Tablet by mouth daily. upadacitinib (Rinvoq) 15 mg Tb24 Take 15 mg by mouth daily. Indications: rheumatoid arthritis    losartan (COZAAR) 50 mg tablet TAKE 1 TABLET BY MOUTH EVERY DAY    buPROPion XL (WELLBUTRIN XL) 300 mg XL tablet TAKE 1 TABLET BY MOUTH EVERY DAY IN THE MORNING    buPROPion XL (WELLBUTRIN XL) 150 mg tablet Take 1 Tablet by mouth every morning. ondansetron (ZOFRAN ODT) 4 mg disintegrating tablet TAKE 1 TABLET BY MOUTH EVERY EIGHT HOURS AS NEEDED FOR NAUSEA OR VOMITING. acetaminophen (Tylenol Extra Strength) 500 mg tablet Take 500 mg by mouth every six (6) hours as needed for Pain.    triamcinolone acetonide (KENALOG) 0.1 % topical cream Apply  to affected area two (2) times a day. use thin layer     No current facility-administered medications for this visit.        Physical Examination: General appearance - alert, well appearing, and in no distress  Mental status - alert, oriented to person, place, and time, anxious      Assessment/ Plan:   Diagnoses and all orders for this visit:    1. Recurrent depression (HCC)  -     ARIPiprazole (ABILIFY) 5 mg tablet; Take 1 Tablet by mouth nightly. 2. Anxiety  Add Abilify onto Celexa follow-up in 2 to 3 weeks. Extend short-term disability through 8 November with plan to return to work on the ninth. Follow-up and Dispositions    Return in about 2 weeks (around 11/1/2022), or if symptoms worsen or fail to improve. I have discussed the diagnosis with the patient and the intended plan as seen in the above orders. The patient has received an after-visit summary and questions were answered concerning future plans. Pt conveyed understanding of plan. Medication Side Effects and Warnings were discussed with patient      Claudie Goldberg is being evaluated by a Virtual Visit (video visit) encounter to address concerns as mentioned above. A caregiver was present when appropriate. Due to this being a TeleHealth encounter (During [facility] public health emergency), evaluation of the following organ systems was limited: Vitals/Constitutional/EENT/Resp/CV/GI//MS/Neuro/Skin/Heme-Lymph-Imm. Pursuant to the emergency declaration under the [address] [place] [address] authority and the [employer] and [employer] Act, this Virtual Visit was conducted with patient's (and/or legal guardian's) consent, to reduce the patient's risk of exposure to COVID-19 and provide necessary medical care. The patient (and/or legal guardian) has also been advised to contact this office for worsening conditions or problems, and seek emergency medical treatment and/or call 911 if deemed necessary. Patient identification was verified at the start of the visit: Yes    Services were provided through a video synchronous discussion virtually to substitute for in-person clinic visit.  Patient and provider were located at their individual [facility].  --Gabino Melgar, DO on 10/18/2022 at 9:41 AM

## 2022-10-19 ENCOUNTER — DOCUMENTATION ONLY (OUTPATIENT)
Dept: FAMILY MEDICINE CLINIC | Age: 42
End: 2022-10-19

## 2022-10-19 NOTE — PROGRESS NOTES
STD forms faxed to [facility]. Fax number [phone] confirmation number [id number].

## 2022-10-20 LAB
ALBUMIN SERPL-MCNC: 5 G/DL (ref 4–5)
ALBUMIN/GLOB SERPL: 1.9 {RATIO} (ref 1.2–2.2)
ALP SERPL-CCNC: 59 IU/L (ref 44–121)
ALT SERPL-CCNC: 18 IU/L (ref 0–44)
AST SERPL-CCNC: 18 IU/L (ref 0–40)
BASOPHILS # BLD AUTO: 0 X10E3/UL (ref 0–0.2)
BASOPHILS NFR BLD AUTO: 0 %
BILIRUB SERPL-MCNC: 1.3 MG/DL (ref 0–1.2)
BUN SERPL-MCNC: 15 MG/DL (ref 6–24)
BUN/CREAT SERPL: 14 (ref 9–20)
CALCIUM SERPL-MCNC: 10.1 MG/DL (ref 8.7–10.2)
CHLORIDE SERPL-SCNC: 99 MMOL/L (ref 96–106)
CHOLEST SERPL-MCNC: 286 MG/DL (ref 100–199)
CO2 SERPL-SCNC: 24 MMOL/L (ref 20–29)
COMMENT:: ABNORMAL
CREAT SERPL-MCNC: 1.07 MG/DL (ref 0.76–1.27)
CRP SERPL-MCNC: 2 MG/L (ref 0–10)
EGFR: 90 ML/MIN/1.73
EOSINOPHIL # BLD AUTO: 0.1 X10E3/UL (ref 0–0.4)
EOSINOPHIL NFR BLD AUTO: 1 %
ERYTHROCYTE [DISTWIDTH] IN BLOOD BY AUTOMATED COUNT: 12 % (ref 11.6–15.4)
ERYTHROCYTE [SEDIMENTATION RATE] IN BLOOD BY WESTERGREN METHOD: 2 MM/HR (ref 0–15)
GLOBULIN SER CALC-MCNC: 2.6 G/DL (ref 1.5–4.5)
GLUCOSE SERPL-MCNC: 98 MG/DL (ref 70–99)
HCT VFR BLD AUTO: 47.1 % (ref 37.5–51)
HDLC SERPL-MCNC: 57 MG/DL
HGB BLD-MCNC: 16.3 G/DL (ref 13–17.7)
IMM GRANULOCYTES # BLD AUTO: 0 X10E3/UL (ref 0–0.1)
IMM GRANULOCYTES NFR BLD AUTO: 0 %
LDLC SERPL CALC-MCNC: 205 MG/DL (ref 0–99)
LYMPHOCYTES # BLD AUTO: 1.7 X10E3/UL (ref 0.7–3.1)
LYMPHOCYTES NFR BLD AUTO: 27 %
MCH RBC QN AUTO: 31.3 PG (ref 26.6–33)
MCHC RBC AUTO-ENTMCNC: 34.6 G/DL (ref 31.5–35.7)
MCV RBC AUTO: 91 FL (ref 79–97)
MONOCYTES # BLD AUTO: 0.4 X10E3/UL (ref 0.1–0.9)
MONOCYTES NFR BLD AUTO: 7 %
NEUTROPHILS # BLD AUTO: 4 X10E3/UL (ref 1.4–7)
NEUTROPHILS NFR BLD AUTO: 65 %
PLATELET # BLD AUTO: 266 X10E3/UL (ref 150–450)
POTASSIUM SERPL-SCNC: 4.4 MMOL/L (ref 3.5–5.2)
PROT SERPL-MCNC: 7.6 G/DL (ref 6–8.5)
RBC # BLD AUTO: 5.2 X10E6/UL (ref 4.14–5.8)
SODIUM SERPL-SCNC: 138 MMOL/L (ref 134–144)
TRIGL SERPL-MCNC: 133 MG/DL (ref 0–149)
VLDLC SERPL CALC-MCNC: 24 MG/DL (ref 5–40)
WBC # BLD AUTO: 6.2 X10E3/UL (ref 3.4–10.8)

## 2022-10-21 ENCOUNTER — OFFICE VISIT (OUTPATIENT)
Dept: RHEUMATOLOGY | Age: 42
End: 2022-10-21
Payer: COMMERCIAL

## 2022-10-21 ENCOUNTER — TELEPHONE (OUTPATIENT)
Dept: FAMILY MEDICINE CLINIC | Age: 42
End: 2022-10-21

## 2022-10-21 VITALS
OXYGEN SATURATION: 96 % | TEMPERATURE: 98.2 F | BODY MASS INDEX: 38.57 KG/M2 | RESPIRATION RATE: 16 BRPM | HEART RATE: 95 BPM | DIASTOLIC BLOOD PRESSURE: 96 MMHG | SYSTOLIC BLOOD PRESSURE: 135 MMHG | WEIGHT: 240 LBS | HEIGHT: 66 IN

## 2022-10-21 DIAGNOSIS — M54.9 MID BACK PAIN: ICD-10-CM

## 2022-10-21 DIAGNOSIS — E78.2 MIXED HYPERLIPIDEMIA: ICD-10-CM

## 2022-10-21 DIAGNOSIS — Z79.60 LONG-TERM USE OF IMMUNOSUPPRESSANT MEDICATION: ICD-10-CM

## 2022-10-21 DIAGNOSIS — M06.09 SERONEGATIVE RHEUMATOID ARTHRITIS OF MULTIPLE SITES (HCC): Primary | ICD-10-CM

## 2022-10-21 PROCEDURE — 99214 OFFICE O/P EST MOD 30 MIN: CPT | Performed by: INTERNAL MEDICINE

## 2022-10-21 PROCEDURE — 3078F DIAST BP <80 MM HG: CPT | Performed by: INTERNAL MEDICINE

## 2022-10-21 PROCEDURE — 3074F SYST BP LT 130 MM HG: CPT | Performed by: INTERNAL MEDICINE

## 2022-10-21 NOTE — PROGRESS NOTES
Chief Complaint   Patient presents with    Joint Pain     1. Have you been to the ER, urgent care clinic since your last visit? Hospitalized since your last visit? No

## 2022-10-21 NOTE — PROGRESS NOTES
REASON FOR VISIT:    is a 37 y.o. male with history of eczema who is returning for in-office Rheumatology followup of seronegative rheumatoid arthritis. Inflammatory arthritis phenotype includes:  Anti-CCP positive: no  Rheumatoid factor positive: no  Erosive disease: no  Extra-articular manifestations include: none  Relevant comorbidities include: Eczema     Immunosuppression Screening (7/22/2021): Quantiferon TB: negative  PPD:  Not performed  Hepatitis B: negative  Hepatitis C: negative     Therapy History includes:  Current DMARD therapy include: Rinvoq (9/21-present)  Prior DMARD therapy include: methotrexate 20 mg every Thursday (7/01/2021 to 9/08/2021), Trygve Mine 11 mg XR daily (9/08/2021; SAMPLE, nonpreferred by insurance)  Discontinued DMARDs because of inefficacy: None   Discontinued DMARDs because of side effects: methotrexate (short-term memory loss, intermittent dizziness and nausea)    HISTORY OF PRESENT ILLNESS    Pt returns for a follow up. Pt restarted Rinvog since LV. He says that he feels a lot better since getting back on this medication. He says that his hands sometimes get sore after playing the guitar for 45 minutes straight. He also notes that his ankles become sore sometimes. These are his only joint complaints. Pt says that he has not been eating healthy lately, which he thinks may be contributing to his LDL. Pt notes that he had a muscle spasm in his back that hurt every time he coughed. He no longer has this, and he does not think it has to do with his RA. Pt denies rashes, eye problems, infections since LV. Pt smokes marijuana. He says that this sometimes helps his pain. REVIEW OF SYSTEMS  A comprehensive review of systems was negative except for that written in the HPI. A 10-point review of systems is per the new patient questionnaire, which has been reviewed extensively and scanned into the electronic medical record for future reference.   Review of systems is as above and is otherwise negative. ALLERGIES  Patient has no known allergies. MEDICATIONS  Current Outpatient Medications   Medication Sig    ARIPiprazole (ABILIFY) 5 mg tablet Take 1 Tablet by mouth nightly. cyclobenzaprine (FLEXERIL) 10 mg tablet TAKE 1 TABLET BY MOUTH THREE TIMES A DAY AS NEEDED FOR MUSCLE SPASMS    citalopram (CELEXA) 40 mg tablet Take 1 Tablet by mouth daily. upadacitinib (Rinvoq) 15 mg Tb24 Take 15 mg by mouth daily. Indications: rheumatoid arthritis    losartan (COZAAR) 50 mg tablet TAKE 1 TABLET BY MOUTH EVERY DAY    buPROPion XL (WELLBUTRIN XL) 300 mg XL tablet TAKE 1 TABLET BY MOUTH EVERY DAY IN THE MORNING    buPROPion XL (WELLBUTRIN XL) 150 mg tablet Take 1 Tablet by mouth every morning. ondansetron (ZOFRAN ODT) 4 mg disintegrating tablet TAKE 1 TABLET BY MOUTH EVERY EIGHT HOURS AS NEEDED FOR NAUSEA OR VOMITING. acetaminophen (Tylenol Extra Strength) 500 mg tablet Take 500 mg by mouth every six (6) hours as needed for Pain.    triamcinolone acetonide (KENALOG) 0.1 % topical cream Apply  to affected area two (2) times a day. use thin layer     No current facility-administered medications for this visit. PAST MEDICAL HISTORY  Past Medical History:   Diagnosis Date    Anxiety disorder     Arthritis     back    Depression     Eczema     Obesity, Class II, BMI 35-39.9     Tendonitis     left wrist       FAMILY HISTORY  family history includes Alcohol abuse in his mother; Depression in his mother; Diabetes in his father; Heart Disease in his father; Hypertension in his father; Psychiatric Disorder in his mother. SOCIAL HISTORY  He  reports that he quit smoking about 4 years ago. He has never used smokeless tobacco. He reports current alcohol use of about 2.0 - 3.0 standard drinks per week. He reports current drug use. Drug: Marijuana. 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 No flowsheet data found. General:  The patient is well developed, well nourished, alert, and in no apparent distress. Eyes: Sclera are anicteric. No conjunctival injection. HEENT:  Oropharynx is clear. No oral ulcers. Adequate salivary pooling. No cervical or supraclavicular lymphadenopathy. Lungs:  Clear to auscultation bilaterally, without wheeze or stridor. Normal respiratory effort. Cor:  Regular rate and rhythm. No murmur rub or gallop. Abdomen: Soft, non-tender, without hepatomegaly or masses. Extremities: No calf tenderness or edema. Warm and well perfused. Skin:  Eczematous erythema in mid chest and mid back and more sparse upper arm involvement. Neuro: Normal unassisted gait. Musculoskeletal:    A comprehensive musculoskeletal exam was performed for all joints of each upper and lower extremity and assessed for swelling, tenderness and range of motion. Results are documented as below:  Tenderness in right 4th MCP, right thumb MCP, right lateral ankle. No overt synovitis. Tenderness left of midline in mid-thoracic spine, no palpable bony stepoff. No evidence of synovitis in the small joints of the hands, wrists, shoulders, elbows, hips, knees or ankles.      Labs:  10/19/22: Cholesterol 286, , ESR 2, Cr 1.07, Tbili 1.3, Alk phos 59, AST 19, ALT 18, CBC WNL, CRP 2 mg/L  6/11/22: ESR 2, Cr 1.03, LFT WNL, WBC 7.6, HGB 16.3, Plt 283, CRP 7 mg/L  7/22/21 WBC 6.2, Hgb 15.8, Plt 326; ESR 19, Cr 1.05, LFTs WNL, CRP 6mg/L, QFN gold negative  5/6/21: Hep C Ab neg; C3 WNL, C4 high, ALESSANDRA IFA, RF <10, Lyme IRAJ neg    Imagin21 XR R hand: No erosions, ossicle adjacent to ulnar styloid    13 MR Lspine without: Minimal degenerative changes. There are no spinal canal or foraminal stenoses. Saniya Tanner is a 37 y.o. male who returns for followup of seronegative rheumatoid arthritis, with improving arthritis back on more consistent Rinvoq. He has low-moderate disease activity but is content with his current control; obtaining updated xrays to ensure no e/o interval radiographic progression. LDL is above goal of 100 for RA background and is most likely being driven up as well by his Rinvoq; he intends to focus on diet and approach is PCP for treatment options if he is unable to bring it down sufficiently. 1. Mid back pain  - XR SPINE THORAC 2 V; Future    2. Seronegative rheumatoid arthritis of multiple sites (HCC)  - C REACTIVE PROTEIN, QT; Future  - CBC WITH AUTOMATED DIFF; Future  - METABOLIC PANEL, COMPREHENSIVE; Future  - SED RATE (ESR); Future  - XR SPINE THORAC 2 V; Future  - XR HAND RT MIN 3 V; Future  - XR HAND LT MIN 3 V; Future  - XR FOOT LT MIN 3 V; Future  - XR FOOT RT MIN 3 V; Future    3. Mixed hyperlipidemia  - LIPID PANEL; Future    4. Long-term use of immunosuppressant medication  - XR HAND RT MIN 3 V; Future  - XR HAND LT MIN 3 V; Future  - XR FOOT LT MIN 3 V; Future     Patient Instructions   1) Continue to take one pill of Rinvoq daily. 2) Your LDL is elevated today. If adjusting your diet does not work to lower this number then talk to your PCP about starting a statin. 3) Wear supportive shoes and avoid walking around barefoot as much as possible. 4) I am ordering Xrays for you to get sometime before your next visit. You can get these done at any [address] [place] [address] location. 5) Check labs in 3 months. 6) Follow up in 4-5 months . Let me know if you have any questions or concerns in the meantime.        Orders Placed This Encounter    XR SPINE THORAC 2 V    XR HAND RT MIN 3 V    XR HAND LT MIN 3 V    XR FOOT LT MIN 3 V    XR FOOT RT MIN 3 V    C REACTIVE PROTEIN, QT    CBC WITH AUTOMATED DIFF    METABOLIC PANEL, COMPREHENSIVE    SED RATE (ESR)    LIPID PANEL       Medications: I am having Deon Patient. Mal Coad \"Marcus\" maintain his triamcinolone acetonide, acetaminophen, buPROPion XL, Rinvoq, citalopram, and cyclobenzaprine. Follow up: Return in about 4 months (around 2/21/2023). Face to face time: 15 minutes  Note preparation and records review day of service: 16 minutes  Total provider time day of service: 31 minutes    This was scribed by Nisha Walton in the presence of Dr. Ely Wheatley. The note was reviewed and amended personally, and I agree with the above information.     Aby Stanley MD    Adult Rheumatology   [facility]  A Part of [facility], [address] [place] [address]   Phone [phone]  Fax [phone]

## 2022-10-21 NOTE — PATIENT INSTRUCTIONS
1) Continue to take one pill of Rinvoq daily. 2) Your LDL is elevated today. If adjusting your diet does not work to lower this number then talk to your PCP about starting a statin. 3) Wear supportive shoes and avoid walking around barefoot as much as possible. 4) I am ordering Xrays for you to get sometime before your next visit. You can get these done at any [address] [place] [address] location. 5) Check labs in 3 months. 6) Follow up in 4-5 months . Let me know if you have any questions or concerns in the meantime.

## 2022-10-21 NOTE — TELEPHONE ENCOUNTER
Patient came in to office to  copy of STD form that was faxed to [facility] and it was not at . Please call patient when he can .      CB #[phone]

## 2022-10-31 ENCOUNTER — DOCUMENTATION ONLY (OUTPATIENT)
Dept: FAMILY MEDICINE CLINIC | Age: 42
End: 2022-10-31

## 2022-11-04 ENCOUNTER — OFFICE VISIT (OUTPATIENT)
Dept: FAMILY MEDICINE CLINIC | Age: 42
End: 2022-11-04
Payer: COMMERCIAL

## 2022-11-04 VITALS
OXYGEN SATURATION: 96 % | WEIGHT: 242 LBS | HEART RATE: 91 BPM | BODY MASS INDEX: 38.89 KG/M2 | HEIGHT: 66 IN | TEMPERATURE: 98.2 F | RESPIRATION RATE: 18 BRPM | DIASTOLIC BLOOD PRESSURE: 81 MMHG | SYSTOLIC BLOOD PRESSURE: 129 MMHG

## 2022-11-04 DIAGNOSIS — F41.0 PANIC ATTACKS: Primary | ICD-10-CM

## 2022-11-04 DIAGNOSIS — F33.9 RECURRENT DEPRESSION (HCC): ICD-10-CM

## 2022-11-04 DIAGNOSIS — F41.9 ANXIETY: ICD-10-CM

## 2022-11-04 PROCEDURE — 99214 OFFICE O/P EST MOD 30 MIN: CPT | Performed by: FAMILY MEDICINE

## 2022-11-04 RX ORDER — ARIPIPRAZOLE 15 MG/1
15 TABLET ORAL
Qty: 90 TABLET | Refills: 1 | Status: SHIPPED | OUTPATIENT
Start: 2022-11-04

## 2022-11-04 RX ORDER — CLONAZEPAM 0.5 MG/1
0.5 TABLET ORAL
Qty: 60 TABLET | Refills: 1 | Status: SHIPPED | OUTPATIENT
Start: 2022-11-04

## 2022-11-04 NOTE — PROGRESS NOTES
Chief Complaint   Patient presents with    Follow-up     [facility]       Patient presents in office today for ED follow up from [facility],  Was seen on 10/29/22 for panic attack. Was given Valium and Ativan in the ED to help him calm down. States that his BP was elevated while there. No other concerns. 3 most recent PHQ Screens 11/4/2022   PHQ Not Done -   Little interest or pleasure in doing things Nearly every day   Feeling down, depressed, irritable, or hopeless Nearly every day   Total Score PHQ 2 6   Trouble falling or staying asleep, or sleeping too much Nearly every day   Feeling tired or having little energy Nearly every day   Poor appetite, weight loss, or overeating Nearly every day   Feeling bad about yourself - or that you are a failure or have let yourself or your family down Nearly every day   Trouble concentrating on things such as school, work, reading, or watching TV Nearly every day   Moving or speaking so slowly that other people could have noticed; or the opposite being so fidgety that others notice Nearly every day   Thoughts of being better off dead, or hurting yourself in some way Not at all   PHQ 9 Score 24   How difficult have these problems made it for you to do your work, take care of your home and get along with others Very difficult       1. Have you been to the ER, urgent care clinic since your last visit? Hospitalized since your last visit? Yes When: 10/29/22 Where: [facility] Reason for visit: panic attack    2. Have you seen or consulted any other health care providers outside of the [address] [place] [address] since your last visit? Include any pap smears or colon screening.  No    Learning Assessment 9/8/2021   PRIMARY LEARNER Patient   HIGHEST LEVEL OF EDUCATION - PRIMARY LEARNER  -   BARRIERS PRIMARY LEARNER -   CO-LEARNER CAREGIVER No   PRIMARY LANGUAGE ENGLISH   LEARNER PREFERENCE PRIMARY DEMONSTRATION   LEARNING SPECIAL TOPICS -   ANSWERED BY patient RELATIONSHIP SELF

## 2022-11-04 NOTE — PROGRESS NOTES
Progress Note    he is a 40y.o. year old male who presents for evalution. Subjective:     Pt here for follow up from ED for panic attack and states his BP and HR were both elevated and was given valium then IV ativan which did help. Thought he was dying, states worst panic attack in a very long time. Feels very anxious today. He is taking the Ativan 2 tabs so 10 mg. Supposed to start work again next Wednesday. Reviewed PmHx, RxHx, FmHx, SocHx, AllgHx and updated and dated in the chart. Review of Systems - negative except as listed above in the HPI    Objective:     Vitals:    11/04/22 1403   BP: 129/81   Pulse: 91   Resp: 18   Temp: 98.2 °F (36.8 °C)   TempSrc: Oral   SpO2: 96%   Weight: 242 lb (109.8 kg)   Height: 5' 6\" (1.676 m)       Current Outpatient Medications   Medication Sig    ARIPiprazole (ABILIFY) 15 mg tablet Take 1 Tablet by mouth nightly. clonazePAM (KlonoPIN) 0.5 mg tablet Take 1 Tablet by mouth two (2) times daily as needed for Anxiety. Max Daily Amount: 1 mg.    cyclobenzaprine (FLEXERIL) 10 mg tablet TAKE 1 TABLET BY MOUTH THREE TIMES A DAY AS NEEDED FOR MUSCLE SPASMS    citalopram (CELEXA) 40 mg tablet Take 1 Tablet by mouth daily. upadacitinib (Rinvoq) 15 mg Tb24 Take 15 mg by mouth daily. Indications: rheumatoid arthritis    losartan (COZAAR) 50 mg tablet TAKE 1 TABLET BY MOUTH EVERY DAY    buPROPion XL (WELLBUTRIN XL) 300 mg XL tablet TAKE 1 TABLET BY MOUTH EVERY DAY IN THE MORNING    buPROPion XL (WELLBUTRIN XL) 150 mg tablet Take 1 Tablet by mouth every morning. acetaminophen (TYLENOL) 500 mg tablet Take 500 mg by mouth every six (6) hours as needed for Pain.    triamcinolone acetonide (KENALOG) 0.1 % topical cream Apply  to affected area two (2) times a day. use thin layer    ondansetron (ZOFRAN ODT) 4 mg disintegrating tablet TAKE 1 TABLET BY MOUTH EVERY EIGHT HOURS AS NEEDED FOR NAUSEA OR VOMITING.  (Patient not taking: Reported on 11/4/2022)     No current facility-administered medications for this visit. Physical Examination: General appearance - alert, well appearing, and in no distress  Mental status - very anxious in office  Chest - clear to auscultation, no wheezes, rales or rhonchi, symmetric air entry  Heart - normal rate, regular rhythm, normal S1, S2, no murmurs, rubs, clicks or gallops      Assessment/ Plan:   Diagnoses and all orders for this visit:    1. Panic attacks  KLONOPIN 0.5 BID PRN FOR ANXIETY. Discussed not meant for long-term use to help get acute anxiety under control. We will also adjust the Abilify to 15 mg nightly. Follow-up appointment scheduled for 12/6/2022  2. Recurrent depression (HCC)  -     ARIPiprazole (ABILIFY) 15 mg tablet; Take 1 Tablet by mouth nightly. 3. Anxiety  -     clonazePAM (KlonoPIN) 0.5 mg tablet; Take 1 Tablet by mouth two (2) times daily as needed for Anxiety. Max Daily Amount: 1 mg. Acute exacerbation of anxiety  Follow-up and Dispositions    Return if symptoms worsen or fail to improve. I have discussed the diagnosis with the patient and the intended plan as seen in the above orders. The patient has received an after-visit summary and questions were answered concerning future plans. Pt conveyed understanding of plan.     Medication Side Effects and Warnings were discussed with patient    An electronic signature was used to authenticate this note  Gabino Melgar, DO

## 2022-11-09 ENCOUNTER — OFFICE VISIT (OUTPATIENT)
Dept: CARDIOLOGY CLINIC | Age: 42
End: 2022-11-09
Payer: COMMERCIAL

## 2022-11-09 VITALS
DIASTOLIC BLOOD PRESSURE: 92 MMHG | HEIGHT: 66 IN | BODY MASS INDEX: 38.89 KG/M2 | SYSTOLIC BLOOD PRESSURE: 128 MMHG | HEART RATE: 93 BPM | WEIGHT: 242 LBS | OXYGEN SATURATION: 97 %

## 2022-11-09 DIAGNOSIS — E78.5 DYSLIPIDEMIA: ICD-10-CM

## 2022-11-09 DIAGNOSIS — I10 ESSENTIAL HYPERTENSION: Primary | ICD-10-CM

## 2022-11-09 DIAGNOSIS — R00.0 TACHYCARDIA: ICD-10-CM

## 2022-11-09 DIAGNOSIS — E66.01 OBESITY, MORBID (HCC): ICD-10-CM

## 2022-11-09 PROBLEM — M06.9 RHEUMATOID ARTHRITIS (HCC): Status: ACTIVE | Noted: 2022-11-09

## 2022-11-09 PROCEDURE — 99204 OFFICE O/P NEW MOD 45 MIN: CPT | Performed by: SPECIALIST

## 2022-11-09 PROCEDURE — 3078F DIAST BP <80 MM HG: CPT | Performed by: SPECIALIST

## 2022-11-09 PROCEDURE — 93000 ELECTROCARDIOGRAM COMPLETE: CPT | Performed by: SPECIALIST

## 2022-11-09 PROCEDURE — 3074F SYST BP LT 130 MM HG: CPT | Performed by: SPECIALIST

## 2022-11-09 RX ORDER — LOSARTAN POTASSIUM AND HYDROCHLOROTHIAZIDE 12.5; 1 MG/1; MG/1
1 TABLET ORAL DAILY
Qty: 90 TABLET | Refills: 3 | Status: SHIPPED | OUTPATIENT
Start: 2022-11-09

## 2022-11-09 NOTE — PROGRESS NOTES
Kasandra Jerry MD. [facility] [place]          Patient: Lance Kaur  : 1980      Today's Date: 2022        HISTORY OF PRESENT ILLNESS:     History of Present Illness:    Dr. Nayely Dunlap recently noted \"Pt here for follow up from ED for panic attack and states his BP and HR were both elevated and was given valium then IV ativan which did help. Thought he was dying, states worst panic attack in a very long time. Feels very anxious today. He is taking the Ativan 2 tabs so 10 mg. Supposed to start work again next Wednesday. \"      Here to see Cardiology for HTN and history of congenital heart condition (see below). He has some palpitations. BP 120s/90 typically at home. No exertional CP. No SOB usually. Has panic attacks and SOB with that. Some chest wall spasms. PAST MEDICAL HISTORY:     Past Medical History:   Diagnosis Date    Anxiety disorder     Arthritis     back    Depression     Eczema     HTN (hypertension)     Obesity, Class II, BMI 35-39.9     Tendonitis     left wrist       Past Surgical History:   Procedure Laterality Date    HX ENDOSCOPY      HX HERNIA REPAIR  2021    Robotic-assisted laparoscopic supraumbilical herniorrhaphy with mesh. HX KNEE ARTHROSCOPY Left     HX ORTHOPAEDIC Left     Repair of left wrist tendon. CURRENT MEDICATIONS:    .  Current Outpatient Medications   Medication Sig Dispense Refill    ARIPiprazole (ABILIFY) 15 mg tablet Take 1 Tablet by mouth nightly. 90 Tablet 1    clonazePAM (KlonoPIN) 0.5 mg tablet Take 1 Tablet by mouth two (2) times daily as needed for Anxiety. Max Daily Amount: 1 mg. 60 Tablet 1    cyclobenzaprine (FLEXERIL) 10 mg tablet TAKE 1 TABLET BY MOUTH THREE TIMES A DAY AS NEEDED FOR MUSCLE SPASMS 60 Tablet 2    citalopram (CELEXA) 40 mg tablet Take 1 Tablet by mouth daily. 90 Tablet 0    upadacitinib (Rinvoq) 15 mg Tb24 Take 15 mg by mouth daily.  Indications: rheumatoid arthritis 90 Each 1    losartan (COZAAR) 50 mg tablet TAKE 1 TABLET BY MOUTH EVERY DAY 30 Tablet 1    buPROPion XL (WELLBUTRIN XL) 300 mg XL tablet TAKE 1 TABLET BY MOUTH EVERY DAY IN THE MORNING 90 Tablet 1    buPROPion XL (WELLBUTRIN XL) 150 mg tablet Take 1 Tablet by mouth every morning. 90 Tablet 4    ondansetron (ZOFRAN ODT) 4 mg disintegrating tablet TAKE 1 TABLET BY MOUTH EVERY EIGHT HOURS AS NEEDED FOR NAUSEA OR VOMITING. (Patient not taking: Reported on 2022) 20 Tablet 1    acetaminophen (TYLENOL) 500 mg tablet Take 500 mg by mouth every six (6) hours as needed for Pain.      triamcinolone acetonide (KENALOG) 0.1 % topical cream Apply  to affected area two (2) times a day. use thin layer 60 g 5       No Known Allergies      SOCIAL HISTORY:     Social History     Tobacco Use    Smoking status: Former     Years: 15.00     Types: Cigarettes     Quit date: 2018     Years since quittin.8    Smokeless tobacco: Never   Vaping Use    Vaping Use: Never used   Substance Use Topics    Alcohol use: Yes     Alcohol/week: 2.0 - 3.0 standard drinks     Types: 2 - 3 Cans of beer per week     Comment: 2-3 daily    Drug use: Yes     Types: Marijuana         FAMILY HISTORY:     Family History   Problem Relation Age of Onset    Depression Mother     Alcohol abuse Mother     Psychiatric Disorder Mother     Diabetes Father     Heart Disease Father     Hypertension Father          REVIEW OF SYMPTOMS:     Review of Symptoms:  Constitutional: Negative for fever, chills  HEENT: Negative for nosebleeds, tinnitus, and vision changes. Respiratory: Negative for cough, wheezing  Cardiovascular: Negative for orthopnea, claudication, syncope, and PND. Gastrointestinal: Negative for abdominal pain, diarrhea, melena. Genitourinary: Negative for dysuria  Musculoskeletal: Negative for myalgias. Skin: Negative for rash  Heme: No problems bleeding. Neurological: Negative for speech change and focal weakness.        PHYSICAL EXAM:     Physical Exam:  Visit Vitals  BP (!) 128/92 (BP 1 Location: Left upper arm, BP Patient Position: Sitting, BP Cuff Size: Large adult)   Pulse 93   Ht 5' 6\" (1.676 m)   Wt 242 lb (109.8 kg)   SpO2 97%   BMI 39.06 kg/m²       Patient appears generally well, mood and affect are appropriate and pleasant. HEENT:  Hearing intact, non-icteric, normocephalic, atraumatic. Neck Exam: Supple, No JVD   Lung Exam: Clear to auscultation, even breath sounds. Cardiac Exam: Regular rate and rhythm with no murmur or rub  Abdomen: Soft, non-tender  Extremities: Moves all ext well. No lower extremity edema. MSKTL: Overall good ROM ext  Skin: No significant rashes  Psych: Appropriate affect  Neuro - Grossly intact        LABS / OTHER STUDIES:     Lab Results   Component Value Date/Time    Sodium 138 10/19/2022 11:43 AM    Potassium 4.4 10/19/2022 11:43 AM    Chloride 99 10/19/2022 11:43 AM    CO2 24 10/19/2022 11:43 AM    Anion gap 4 (L) 01/06/2021 09:21 AM    Glucose 98 10/19/2022 11:43 AM    BUN 15 10/19/2022 11:43 AM    Creatinine 1.07 10/19/2022 11:43 AM    BUN/Creatinine ratio 14 10/19/2022 11:43 AM    GFR est  07/22/2021 04:41 PM    GFR est non-AA 89 07/22/2021 04:41 PM    Calcium 10.1 10/19/2022 11:43 AM    Bilirubin, total 1.3 (H) 10/19/2022 11:43 AM    Alk.  phosphatase 59 10/19/2022 11:43 AM    Protein, total 7.6 10/19/2022 11:43 AM    Albumin 5.0 10/19/2022 11:43 AM    A-G Ratio 1.9 10/19/2022 11:43 AM    ALT (SGPT) 18 10/19/2022 11:43 AM    AST (SGOT) 18 10/19/2022 11:43 AM     Lab Results   Component Value Date/Time    WBC 6.2 10/19/2022 11:43 AM    HGB 16.3 10/19/2022 11:43 AM    HCT 47.1 10/19/2022 11:43 AM    PLATELET 633 80/01/9111 11:43 AM    MCV 91 10/19/2022 11:43 AM     Lab Results   Component Value Date/Time    Cholesterol, total 286 (H) 10/19/2022 11:43 AM    HDL Cholesterol 57 10/19/2022 11:43 AM    LDL, calculated 205 (H) 10/19/2022 11:43 AM    LDL, calculated 167 (H) 12/06/2017 03:45 PM    VLDL, calculated 24 10/19/2022 11:43 AM    VLDL, calculated 29 12/06/2017 03:45 PM    Triglyceride 133 10/19/2022 11:43 AM     Lab Results   Component Value Date/Time    TSH 1.060 12/06/2017 03:45 PM           CARDIAC DIAGNOSTICS:     Cardiac Evaluation Includes:  I reviewed the test results below. EKG 11/9/22 - NSR, normal       ASSESSMENT AND PLAN:     Assessment and Plan:    1) Congenital heart condition ?  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 Did not need intervention. Lasts aw cardiology 20+ years ago. - he has some palpitations when he over eats, but otherwise is OK. 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 Plays musical instrument. Has 3 kids. Jimbo Choudhury MD, [address], [address]  [address]  Ph: [phone]   Ph [phone]        ADDENDUM   12/13/2022  Echo 12/13/22 - LVEF 55%. Left atrium size is normal. Partial anomalous pulmonary vein connection by history. At least one left and one right pulmonary vein seen by 2D, color and spectral Doppler. Interatrial Septum: No interatrial shunt visualized with color Doppler. Agitated saline study was negative with and without provocation.   Bubble study performed with IV in both right and left arms.     Treadmill Stress Test 12/13/22 - walked 5:20 (8.7 METS), normal study     Will send a message - normal studies

## 2022-11-09 NOTE — PROGRESS NOTES
Chief Complaint   Patient presents with    New Patient     Rai Villarreal ER 10/29 panic attack     Vitals:    11/09/22 0815 11/09/22 0830   BP: (!) 126/98 (!) 128/92   BP 1 Location: Right arm Left upper arm   BP Patient Position: Sitting Sitting   BP Cuff Size: Large adult Large adult   Pulse: 93    Height: 5' 6\" (1.676 m)    Weight: 242 lb (109.8 kg)    SpO2: 97%      Chest pain denied   SOB denied   Palpitations see below   Swelling in hands/feet denied   Dizziness denied   Recent hospital stays denied   Refills denied     Personal hx of RA    Anomalous vein found at age 12. Palpitations on occasion (2 x's a month at most) usually if overeating. Dad had x2 anomlous veins; taken care of when pt was around 6.     Orders for Check an echo with a bubble study (to look for anomalous pulm vein drainage)     Check a treadmill stress test (chest pain)     See me in 2 months  per Dr. Leona WAGONER.

## 2022-11-15 RX ORDER — BUPROPION HYDROCHLORIDE 300 MG/1
TABLET ORAL
Qty: 90 TABLET | Refills: 1 | Status: SHIPPED | OUTPATIENT
Start: 2022-11-15

## 2022-11-15 RX ORDER — ONDANSETRON 4 MG/1
4 TABLET, ORALLY DISINTEGRATING ORAL
Qty: 20 TABLET | Refills: 1 | Status: SHIPPED | OUTPATIENT
Start: 2022-11-15

## 2022-12-04 DIAGNOSIS — G89.29 CHRONIC MIDLINE LOW BACK PAIN WITH LEFT-SIDED SCIATICA: ICD-10-CM

## 2022-12-04 DIAGNOSIS — M54.42 CHRONIC MIDLINE LOW BACK PAIN WITH LEFT-SIDED SCIATICA: ICD-10-CM

## 2022-12-06 ENCOUNTER — VIRTUAL VISIT (OUTPATIENT)
Dept: FAMILY MEDICINE CLINIC | Age: 42
End: 2022-12-06
Payer: COMMERCIAL

## 2022-12-06 DIAGNOSIS — F41.9 ANXIETY: Primary | ICD-10-CM

## 2022-12-06 PROCEDURE — 99213 OFFICE O/P EST LOW 20 MIN: CPT | Performed by: FAMILY MEDICINE

## 2022-12-06 RX ORDER — CYCLOBENZAPRINE HCL 10 MG
TABLET ORAL
Qty: 60 TABLET | Refills: 2 | Status: SHIPPED | OUTPATIENT
Start: 2022-12-06

## 2022-12-06 NOTE — PROGRESS NOTES
Progress Note    he is a 40y.o. year old male who presents for evalution. Subjective:     Patient here for follow-up with his anxiety and panic attacks. We had started him on Klonopin 0.5 twice daily as needed which has been taking a half a pill to start and then if he needs the other half he will take it. He has plenty of this left has been trying to avoid using. We had also increase his Abilify to 15 mg and he feels like he is in a much better place now. Content with dosages. Reviewed PmHx, RxHx, FmHx, SocHx, AllgHx and updated and dated in the chart. Review of Systems - negative except as listed above in the HPI    Objective: There were no vitals filed for this visit. Current Outpatient Medications   Medication Sig    cyclobenzaprine (FLEXERIL) 10 mg tablet TAKE 1 TABLET BY MOUTH THREE TIMES A DAY AS NEEDED FOR MUSCLE SPASMS    ondansetron (ZOFRAN ODT) 4 mg disintegrating tablet Take 1 Tablet by mouth every eight (8) hours as needed for Nausea or Vomiting. buPROPion XL (WELLBUTRIN XL) 300 mg XL tablet TAKE 1 TABLET BY MOUTH EVERY DAY IN THE MORNING    losartan-hydroCHLOROthiazide (HYZAAR) 100-12.5 mg per tablet Take 1 Tablet by mouth daily. ARIPiprazole (ABILIFY) 15 mg tablet Take 1 Tablet by mouth nightly. clonazePAM (KlonoPIN) 0.5 mg tablet Take 1 Tablet by mouth two (2) times daily as needed for Anxiety. Max Daily Amount: 1 mg.    citalopram (CELEXA) 40 mg tablet Take 1 Tablet by mouth daily. upadacitinib (Rinvoq) 15 mg Tb24 Take 15 mg by mouth daily. Indications: rheumatoid arthritis    buPROPion XL (WELLBUTRIN XL) 150 mg tablet Take 1 Tablet by mouth every morning. acetaminophen (TYLENOL) 500 mg tablet Take 500 mg by mouth every six (6) hours as needed for Pain.    triamcinolone acetonide (KENALOG) 0.1 % topical cream Apply  to affected area two (2) times a day. use thin layer     No current facility-administered medications for this visit.        Physical Examination: General appearance - alert, well appearing, and in no distress  Mental status - alert, oriented to person, place, and time      Assessment/ Plan:   Diagnoses and all orders for this visit:    1. Anxiety  Doing much better overall with the Abilify 15. He has a clonazepam for as needed and has plenty of this left. Follow-up 3 months  Follow-up and Dispositions    Return in about 3 months (around 3/6/2023), or if symptoms worsen or fail to improve. I have discussed the diagnosis with the patient and the intended plan as seen in the above orders. The patient has received an after-visit summary and questions were answered concerning future plans. Pt conveyed understanding of plan. Medication Side Effects and Warnings were discussed with patient      Cr Adams is being evaluated by a Virtual Visit (video visit) encounter to address concerns as mentioned above. A caregiver was present when appropriate. Due to this being a TeleHealth encounter (During [facility]- public health emergency), evaluation of the following organ systems was limited: Vitals/Constitutional/EENT/Resp/CV/GI//MS/Neuro/Skin/Heme-Lymph-Imm. Pursuant to the emergency declaration under the [facility] [address] authority and the [employer] General Act, this Virtual Visit was conducted with patient's (and/or legal guardian's) consent, to reduce the patient's risk of exposure to COVID-19 and provide necessary medical care. The patient (and/or legal guardian) has also been advised to contact this office for worsening conditions or problems, and seek emergency medical treatment and/or call 911 if deemed necessary. Patient identification was verified at the start of the visit: Yes    Services were provided through a video synchronous discussion virtually to substitute for in-person clinic visit.  Patient and provider were located at their individual homes.   --Karena Malone DO on 12/6/2022 at 11:06 AM

## 2022-12-06 NOTE — PATIENT INSTRUCTIONS

## 2022-12-13 ENCOUNTER — ANCILLARY PROCEDURE (OUTPATIENT)
Dept: CARDIOLOGY CLINIC | Age: 42
End: 2022-12-13
Payer: COMMERCIAL

## 2022-12-13 ENCOUNTER — ANCILLARY PROCEDURE (OUTPATIENT)
Dept: CARDIOLOGY CLINIC | Age: 42
End: 2022-12-13

## 2022-12-13 VITALS — WEIGHT: 242 LBS | HEIGHT: 66 IN | BODY MASS INDEX: 38.89 KG/M2

## 2022-12-13 VITALS
DIASTOLIC BLOOD PRESSURE: 78 MMHG | HEIGHT: 66 IN | WEIGHT: 242 LBS | BODY MASS INDEX: 38.89 KG/M2 | SYSTOLIC BLOOD PRESSURE: 120 MMHG

## 2022-12-13 DIAGNOSIS — I10 ESSENTIAL HYPERTENSION: ICD-10-CM

## 2022-12-13 DIAGNOSIS — Q26.3 PARTIAL ANOMALOUS PULMONARY VENOUS RETURN (PAPVR): ICD-10-CM

## 2022-12-13 DIAGNOSIS — R00.0 TACHYCARDIA: ICD-10-CM

## 2022-12-13 LAB
ECHO AO ASC DIAM: 2.9 CM
ECHO AO ASCENDING AORTA INDEX: 1.34 CM/M2
ECHO AO ROOT DIAM: 3.2 CM
ECHO AO ROOT INDEX: 1.47 CM/M2
ECHO AV AREA PEAK VELOCITY: 4.1 CM2
ECHO AV AREA VTI: 4.1 CM2
ECHO AV AREA/BSA PEAK VELOCITY: 1.9 CM2/M2
ECHO AV AREA/BSA VTI: 1.9 CM2/M2
ECHO AV MEAN GRADIENT: 3 MMHG
ECHO AV MEAN VELOCITY: 0.8 M/S
ECHO AV PEAK GRADIENT: 5 MMHG
ECHO AV PEAK VELOCITY: 1.1 M/S
ECHO AV VELOCITY RATIO: 0.91
ECHO AV VTI: 18.5 CM
ECHO LA DIAMETER INDEX: 1.38 CM/M2
ECHO LA DIAMETER: 3 CM
ECHO LA TO AORTIC ROOT RATIO: 0.94
ECHO LA VOL 2C: 39 ML (ref 18–58)
ECHO LA VOL 4C: 31 ML (ref 18–58)
ECHO LA VOLUME AREA LENGTH: 39 ML
ECHO LA VOLUME INDEX A2C: 18 ML/M2 (ref 16–34)
ECHO LA VOLUME INDEX A4C: 14 ML/M2 (ref 16–34)
ECHO LA VOLUME INDEX AREA LENGTH: 18 ML/M2 (ref 16–34)
ECHO LV E' LATERAL VELOCITY: 11 CM/S
ECHO LV E' SEPTAL VELOCITY: 9 CM/S
ECHO LV EDV A2C: 76 ML
ECHO LV EDV A4C: 101 ML
ECHO LV EDV BP: 88 ML (ref 67–155)
ECHO LV EDV INDEX A4C: 47 ML/M2
ECHO LV EDV INDEX BP: 41 ML/M2
ECHO LV EDV NDEX A2C: 35 ML/M2
ECHO LV EF PHYSICIAN: 55 %
ECHO LV ESV A2C: 30 ML
ECHO LV ESV A4C: 38 ML
ECHO LV ESV BP: 34 ML (ref 22–58)
ECHO LV ESV INDEX A2C: 14 ML/M2
ECHO LV ESV INDEX A4C: 18 ML/M2
ECHO LV ESV INDEX BP: 16 ML/M2
ECHO LV FRACTIONAL SHORTENING: 33 % (ref 28–44)
ECHO LV INTERNAL DIMENSION DIASTOLE INDEX: 2.12 CM/M2
ECHO LV INTERNAL DIMENSION DIASTOLIC: 4.6 CM (ref 4.2–5.9)
ECHO LV INTERNAL DIMENSION SYSTOLIC INDEX: 1.43 CM/M2
ECHO LV INTERNAL DIMENSION SYSTOLIC: 3.1 CM
ECHO LV IVSD: 0.8 CM (ref 0.6–1)
ECHO LV MASS 2D: 108.5 G (ref 88–224)
ECHO LV MASS INDEX 2D: 50 G/M2 (ref 49–115)
ECHO LV POSTERIOR WALL DIASTOLIC: 0.7 CM (ref 0.6–1)
ECHO LV RELATIVE WALL THICKNESS RATIO: 0.3
ECHO LVOT AREA: 4.5 CM2
ECHO LVOT AV VTI INDEX: 0.93
ECHO LVOT DIAM: 2.4 CM
ECHO LVOT MEAN GRADIENT: 2 MMHG
ECHO LVOT PEAK GRADIENT: 4 MMHG
ECHO LVOT PEAK VELOCITY: 1 M/S
ECHO LVOT STROKE VOLUME INDEX: 35.8 ML/M2
ECHO LVOT SV: 77.8 ML
ECHO LVOT VTI: 17.2 CM
ECHO MV A VELOCITY: 0.59 M/S
ECHO MV AREA PHT: 4 CM2
ECHO MV AREA VTI: 5.5 CM2
ECHO MV E DECELERATION TIME (DT): 187.8 MS
ECHO MV E VELOCITY: 0.64 M/S
ECHO MV E/A RATIO: 1.08
ECHO MV E/E' LATERAL: 5.82
ECHO MV E/E' RATIO (AVERAGED): 6.46
ECHO MV E/E' SEPTAL: 7.11
ECHO MV LVOT VTI INDEX: 0.83
ECHO MV MAX VELOCITY: 0.7 M/S
ECHO MV MEAN GRADIENT: 1 MMHG
ECHO MV MEAN VELOCITY: 0.4 M/S
ECHO MV PEAK GRADIENT: 2 MMHG
ECHO MV PRESSURE HALF TIME (PHT): 54.5 MS
ECHO MV VTI: 14.2 CM
ECHO RV INTERNAL DIMENSION: 4 CM
ECHO RV TAPSE: 2 CM (ref 1.7–?)
STRESS ANGINA INDEX: 0
STRESS BASELINE DIAS BP: 76 MMHG
STRESS BASELINE HR: 95 BPM
STRESS BASELINE SYS BP: 122 MMHG
STRESS ESTIMATED WORKLOAD: 8.7 METS
STRESS EXERCISE DUR MIN: 5 MIN
STRESS EXERCISE DUR SEC: 20 SEC
STRESS O2 SAT PEAK: 98 %
STRESS O2 SAT REST: 98 %
STRESS PEAK DIAS BP: 86 MMHG
STRESS PEAK SYS BP: 164 MMHG
STRESS PERCENT HR ACHIEVED: 88 %
STRESS POST PEAK HR: 157 BPM
STRESS RATE PRESSURE PRODUCT: NORMAL BPM*MMHG
STRESS SR DUKE TREADMILL SCORE: 5
STRESS ST DEPRESSION: 0 MM
STRESS TARGET HR: 179 BPM

## 2022-12-13 PROCEDURE — 93015 CV STRESS TEST SUPVJ I&R: CPT | Performed by: SPECIALIST

## 2022-12-13 PROCEDURE — 93306 TTE W/DOPPLER COMPLETE: CPT | Performed by: SPECIALIST

## 2022-12-14 NOTE — PROGRESS NOTES
Dear  Kamala Maloney,  Your stress test and echo show normal findings. Heart function is normal.    Please let me know if you have any questions.    Heather Araiza,  Dr. Mary Ann Jj

## 2022-12-26 DIAGNOSIS — F41.9 ANXIETY: ICD-10-CM

## 2022-12-27 RX ORDER — CITALOPRAM 40 MG/1
TABLET, FILM COATED ORAL
Qty: 90 TABLET | Refills: 0 | Status: SHIPPED | OUTPATIENT
Start: 2022-12-27

## 2023-01-07 DIAGNOSIS — F33.9 RECURRENT DEPRESSION (HCC): ICD-10-CM

## 2023-01-10 RX ORDER — ARIPIPRAZOLE 15 MG/1
15 TABLET ORAL
Qty: 90 TABLET | Refills: 1 | Status: SHIPPED | OUTPATIENT
Start: 2023-01-10

## 2023-01-15 DIAGNOSIS — F41.9 ANXIETY: ICD-10-CM

## 2023-01-17 ENCOUNTER — OFFICE VISIT (OUTPATIENT)
Dept: CARDIOLOGY CLINIC | Age: 43
End: 2023-01-17
Payer: COMMERCIAL

## 2023-01-17 VITALS
OXYGEN SATURATION: 97 % | WEIGHT: 246 LBS | HEIGHT: 66 IN | BODY MASS INDEX: 39.53 KG/M2 | SYSTOLIC BLOOD PRESSURE: 138 MMHG | HEART RATE: 99 BPM | DIASTOLIC BLOOD PRESSURE: 88 MMHG

## 2023-01-17 DIAGNOSIS — E78.5 DYSLIPIDEMIA: Primary | ICD-10-CM

## 2023-01-17 DIAGNOSIS — I10 PRIMARY HYPERTENSION: ICD-10-CM

## 2023-01-17 PROCEDURE — 3075F SYST BP GE 130 - 139MM HG: CPT | Performed by: SPECIALIST

## 2023-01-17 PROCEDURE — 99214 OFFICE O/P EST MOD 30 MIN: CPT | Performed by: SPECIALIST

## 2023-01-17 PROCEDURE — 3079F DIAST BP 80-89 MM HG: CPT | Performed by: SPECIALIST

## 2023-01-17 RX ORDER — CITALOPRAM 40 MG/1
40 TABLET, FILM COATED ORAL DAILY
Qty: 90 TABLET | Refills: 0 | Status: SHIPPED | OUTPATIENT
Start: 2023-01-17

## 2023-01-17 NOTE — PROGRESS NOTES
Hamilton Mclean MD. [facility] [place]          Patient: Lisa Gamble  : 1980      Today's Date: 2023        HISTORY OF PRESENT ILLNESS:     History of Present Illness:    Dr. Florentin Donald recently noted \"Pt here for follow up from ED for panic attack and states his BP and HR were both elevated and was given valium then IV ativan which did help. Thought he was dying, states worst panic attack in a very long time. Feels very anxious today. He is taking the Ativan 2 tabs so 10 mg. Supposed to start work again next Wednesday. \"      Here to see Cardiology for HTN and history of congenital heart condition (see below). He has some palpitations. BP 120s/70 typically at home. No exertional CP. No SOB usually. Has panic attacks and SOB with that. Some chest wall spasms. PAST MEDICAL HISTORY:     Past Medical History:   Diagnosis Date    Anxiety disorder     Arthritis     back    Depression     Eczema     HTN (hypertension)     Obesity, Class II, BMI 35-39.9     Rheumatoid arthritis ([facility] [address]     Tendonitis     left wrist       Past Surgical History:   Procedure Laterality Date    HX ENDOSCOPY      HX HERNIA REPAIR  2021    Robotic-assisted laparoscopic supraumbilical herniorrhaphy with mesh. HX KNEE ARTHROSCOPY Left     HX ORTHOPAEDIC Left     Repair of left wrist tendon. CURRENT MEDICATIONS:    .  Current Outpatient Medications   Medication Sig Dispense Refill    citalopram (CELEXA) 40 mg tablet Take 1 Tablet by mouth daily. 90 Tablet 0    ARIPiprazole (ABILIFY) 15 mg tablet Take 1 Tablet by mouth nightly. 90 Tablet 1    cyclobenzaprine (FLEXERIL) 10 mg tablet TAKE 1 TABLET BY MOUTH THREE TIMES A DAY AS NEEDED FOR MUSCLE SPASMS 60 Tablet 2    ondansetron (ZOFRAN ODT) 4 mg disintegrating tablet Take 1 Tablet by mouth every eight (8) hours as needed for Nausea or Vomiting.  20 Tablet 1    buPROPion XL (WELLBUTRIN XL) 300 mg XL tablet TAKE 1 TABLET BY MOUTH EVERY DAY IN THE MORNING 90 Tablet 1    losartan-hydroCHLOROthiazide (HYZAAR) 100-12.5 mg per tablet Take 1 Tablet by mouth daily. 90 Tablet 3    clonazePAM (KlonoPIN) 0.5 mg tablet Take 1 Tablet by mouth two (2) times daily as needed for Anxiety. Max Daily Amount: 1 mg. 60 Tablet 1    upadacitinib (Rinvoq) 15 mg Tb24 Take 15 mg by mouth daily. Indications: rheumatoid arthritis 90 Each 1    buPROPion XL (WELLBUTRIN XL) 150 mg tablet Take 1 Tablet by mouth every morning. 90 Tablet 4    acetaminophen (TYLENOL) 500 mg tablet Take 500 mg by mouth every six (6) hours as needed for Pain.      triamcinolone acetonide (KENALOG) 0.1 % topical cream Apply  to affected area two (2) times a day. use thin layer 60 g 5       No Known Allergies      SOCIAL HISTORY:     Social History     Tobacco Use    Smoking status: Former     Years: 15.00     Types: Cigarettes     Quit date: 2018     Years since quittin.0    Smokeless tobacco: Never   Vaping Use    Vaping Use: Never used   Substance Use Topics    Alcohol use: Yes     Alcohol/week: 2.0 - 3.0 standard drinks     Types: 2 - 3 Cans of beer per week     Comment: 2-3 daily    Drug use: Yes     Types: Marijuana         FAMILY HISTORY:     Family History   Problem Relation Age of Onset    Depression Mother     Alcohol abuse Mother     Psychiatric Disorder Mother     Diabetes Father     Heart Disease Father     Hypertension Father          REVIEW OF SYMPTOMS:     Review of Symptoms:  Constitutional: Negative for fever, chills  HEENT: Negative for nosebleeds, tinnitus, and vision changes. Respiratory: Negative for cough, wheezing  Cardiovascular: Negative for orthopnea, claudication, syncope, and PND. Gastrointestinal: Negative for abdominal pain, diarrhea, melena. Genitourinary: Negative for dysuria  Musculoskeletal: Negative for myalgias. Skin: Negative for rash  Heme: No problems bleeding. Neurological: Negative for speech change and focal weakness.        PHYSICAL EXAM:     Physical Exam:  Visit Vitals  /88 (BP 1 Location: Left upper arm, BP Patient Position: Sitting, BP Cuff Size: Adult)   Pulse 99   Ht 5' 6\" (1.676 m)   Wt 246 lb (111.6 kg)   SpO2 97%   BMI 39.71 kg/m²       Patient appears generally well, mood and affect are appropriate and pleasant. HEENT:  Hearing intact, non-icteric, normocephalic, atraumatic. Neck Exam: Supple, No JVD   Lung Exam: Clear to auscultation, even breath sounds. Cardiac Exam: Regular rate and rhythm with no murmur or rub  Abdomen: Soft, non-tender  Extremities: Moves all ext well. No lower extremity edema. MSKTL: Overall good ROM ext  Skin: No significant rashes  Psych: Appropriate affect  Neuro - Grossly intact        LABS / OTHER STUDIES:     Lab Results   Component Value Date/Time    Sodium 138 10/19/2022 11:43 AM    Potassium 4.4 10/19/2022 11:43 AM    Chloride 99 10/19/2022 11:43 AM    CO2 24 10/19/2022 11:43 AM    Anion gap 4 (L) 01/06/2021 09:21 AM    Glucose 98 10/19/2022 11:43 AM    BUN 15 10/19/2022 11:43 AM    Creatinine 1.07 10/19/2022 11:43 AM    BUN/Creatinine ratio 14 10/19/2022 11:43 AM    GFR est  07/22/2021 04:41 PM    GFR est non-AA 89 07/22/2021 04:41 PM    Calcium 10.1 10/19/2022 11:43 AM    Bilirubin, total 1.3 (H) 10/19/2022 11:43 AM    Alk.  phosphatase 59 10/19/2022 11:43 AM    Protein, total 7.6 10/19/2022 11:43 AM    Albumin 5.0 10/19/2022 11:43 AM    A-G Ratio 1.9 10/19/2022 11:43 AM    ALT (SGPT) 18 10/19/2022 11:43 AM    AST (SGOT) 18 10/19/2022 11:43 AM     Lab Results   Component Value Date/Time    WBC 6.2 10/19/2022 11:43 AM    HGB 16.3 10/19/2022 11:43 AM    HCT 47.1 10/19/2022 11:43 AM    PLATELET 248 87/89/2065 11:43 AM    MCV 91 10/19/2022 11:43 AM     Lab Results   Component Value Date/Time    Cholesterol, total 286 (H) 10/19/2022 11:43 AM    HDL Cholesterol 57 10/19/2022 11:43 AM    LDL, calculated 205 (H) 10/19/2022 11:43 AM    LDL, calculated 167 (H) 12/06/2017 03:45 PM    VLDL, calculated 24 10/19/2022 11:43 AM VLDL, calculated 29 12/06/2017 03:45 PM    Triglyceride 133 10/19/2022 11:43 AM     Lab Results   Component Value Date/Time    TSH 1.060 12/06/2017 03:45 PM           CARDIAC DIAGNOSTICS:     Cardiac Evaluation Includes:  I reviewed the test results below. EKG 11/9/22 - NSR, normal       Echo 12/13/22 - LVEF 55%. Left atrium size is normal. Partial anomalous pulmonary vein connection by history. At least one left and one right pulmonary vein seen by 2D, color and spectral Doppler. Interatrial Septum: No interatrial shunt visualized with color Doppler. Agitated saline study was negative with and without provocation. Bubble study performed with IV in both right and left arms. Treadmill Stress Test 12/13/22 - walked 5:20 (8.7 METS), normal study       ASSESSMENT AND PLAN:     Assessment and Plan:    1) Congenital heart condition ?  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 Did not need intervention.    - Echo 12/13/22 - LVEF 55%. Left atrium size is normal. Partial anomalous pulmonary vein connection by history. At least one left and one right pulmonary vein seen by 2D, color and spectral Doppler. Interatrial Septum: No interatrial shunt visualized with color Doppler. Agitated saline study was negative with and without provocation. Bubble study performed with IV in both right and left arms. - Continues to have some palpitations when he over eats, but otherwise is OK. 2) HTN  - discussed healthy diet, working on decreasing salt intake.   - BP high in the office but OK at home --  Home Bps 120/70  - cont meds     3) Atypical CP and SOB with panic attack   --> stress test and echo OK in 12/22    4) CV risk assessment   - info given on a CT heart scan, pt will schedule    5) RA   - sees Rheum     6) Obesity   - discussed weight loss and diet     7) Dyslipidemia   -  on 10/22  ---> he is to repeat labs soon   - Can use a CT heart scan to guide therapy   - he is working on diet     8) Anxiety     9) See NP in 6 months     [place] [employer]. Plays musical instrument. Has 3 kids. Alexandra Hoff MD, [address], [address]  [place] [address]  [address]  [place], [address] [place] [address]  Ph: [phone]   Ph [phone]

## 2023-01-17 NOTE — PROGRESS NOTES
Miquel Alvarez is a 40 y.o. male    Vitals:    01/17/23 1559   BP: 138/88   BP 1 Location: Left upper arm   BP Patient Position: Sitting   BP Cuff Size: Adult   Pulse: 99   Height: 5' 6\" (1.676 m)   Weight: 246 lb (111.6 kg)   SpO2: 97%       Chief Complaint   Patient presents with    Hypertension    Cholesterol Problem    Irregular Heart Beat     TACHYCARDIA       Chest pain NO  SOB NO  Dizziness NO  Swelling NO  Recent hospital visit NO  Refills NO  COVID VACCINE YES  HAD COVID?  NO

## 2023-01-30 DIAGNOSIS — Z79.60 LONG-TERM USE OF IMMUNOSUPPRESSANT MEDICATION: ICD-10-CM

## 2023-01-30 DIAGNOSIS — M06.09 SERONEGATIVE RHEUMATOID ARTHRITIS OF MULTIPLE SITES (HCC): ICD-10-CM

## 2023-01-30 RX ORDER — UPADACITINIB 15 MG/1
15 TABLET, EXTENDED RELEASE ORAL DAILY
Qty: 90 EACH | Refills: 1 | Status: ACTIVE | OUTPATIENT
Start: 2023-01-30

## 2023-01-30 NOTE — TELEPHONE ENCOUNTER
Silver Avalos from [address] [place] [address] needing pre authorization for patient rx Rinvoq n [id number]  call back # [phone] reference # [id number] please call and advise.

## 2023-01-30 NOTE — TELEPHONE ENCOUNTER
Rinvoq needs PA renewal.    Last visit was 10/21/22  Follow up scheduled for 2/22/23    Lab Results   Component Value Date/Time    Sodium 138 10/19/2022 11:43 AM    Potassium 4.4 10/19/2022 11:43 AM    Chloride 99 10/19/2022 11:43 AM    CO2 24 10/19/2022 11:43 AM    Anion gap 4 (L) 01/06/2021 09:21 AM    Glucose 98 10/19/2022 11:43 AM    BUN 15 10/19/2022 11:43 AM    Creatinine 1.07 10/19/2022 11:43 AM    BUN/Creatinine ratio 14 10/19/2022 11:43 AM    GFR est  07/22/2021 04:41 PM    GFR est non-AA 89 07/22/2021 04:41 PM    Calcium 10.1 10/19/2022 11:43 AM    Bilirubin, total 1.3 (H) 10/19/2022 11:43 AM    Alk.  phosphatase 59 10/19/2022 11:43 AM    Protein, total 7.6 10/19/2022 11:43 AM    Albumin 5.0 10/19/2022 11:43 AM    A-G Ratio 1.9 10/19/2022 11:43 AM    ALT (SGPT) 18 10/19/2022 11:43 AM    AST (SGOT) 18 10/19/2022 11:43 AM     Lab Results   Component Value Date/Time    WBC 6.2 10/19/2022 11:43 AM    HGB 16.3 10/19/2022 11:43 AM    HCT 47.1 10/19/2022 11:43 AM    PLATELET 394 87/44/6711 11:43 AM    MCV 91 10/19/2022 11:43 AM

## 2023-01-31 ENCOUNTER — TRANSCRIBE ORDER (OUTPATIENT)
Dept: GENERAL RADIOLOGY | Age: 43
End: 2023-01-31

## 2023-01-31 ENCOUNTER — HOSPITAL ENCOUNTER (OUTPATIENT)
Dept: GENERAL RADIOLOGY | Age: 43
Discharge: HOME | End: 2023-01-31
Payer: COMMERCIAL

## 2023-01-31 DIAGNOSIS — M54.9 BACK PAIN: ICD-10-CM

## 2023-01-31 DIAGNOSIS — Z79.60 LONG-TERM USE OF IMMUNOSUPPRESSANT MEDICATION: ICD-10-CM

## 2023-01-31 DIAGNOSIS — M06.09 SERONEGATIVE RHEUMATOID ARTHRITIS OF MULTIPLE SITES (HCC): ICD-10-CM

## 2023-01-31 DIAGNOSIS — M54.9 BACK PAIN: Primary | ICD-10-CM

## 2023-01-31 PROCEDURE — 72070 X-RAY EXAM THORAC SPINE 2VWS: CPT

## 2023-01-31 PROCEDURE — 73130 X-RAY EXAM OF HAND: CPT

## 2023-01-31 PROCEDURE — 73630 X-RAY EXAM OF FOOT: CPT

## 2023-02-02 NOTE — PROGRESS NOTES
[facility] [address] [facility] Update    Date: 02/02/23    Approved prescription was routed to the mandated specialty pharmacy [facility]. Prior authorization has been approved through 2-2-2024. Pharmacy will inform patient and provide them with dispensing pharmacy's phone number. Please call us with any questions at  [phone].

## 2023-02-13 DIAGNOSIS — M54.42 CHRONIC MIDLINE LOW BACK PAIN WITH LEFT-SIDED SCIATICA: ICD-10-CM

## 2023-02-13 DIAGNOSIS — G89.29 CHRONIC MIDLINE LOW BACK PAIN WITH LEFT-SIDED SCIATICA: ICD-10-CM

## 2023-02-14 RX ORDER — CYCLOBENZAPRINE HCL 10 MG
TABLET ORAL
Qty: 60 TABLET | Refills: 2 | Status: SHIPPED | OUTPATIENT
Start: 2023-02-14

## 2023-02-22 ENCOUNTER — OFFICE VISIT (OUTPATIENT)
Dept: RHEUMATOLOGY | Age: 43
End: 2023-02-22
Payer: COMMERCIAL

## 2023-02-22 VITALS
SYSTOLIC BLOOD PRESSURE: 129 MMHG | WEIGHT: 236 LBS | TEMPERATURE: 97.3 F | BODY MASS INDEX: 38.09 KG/M2 | RESPIRATION RATE: 16 BRPM | OXYGEN SATURATION: 96 % | DIASTOLIC BLOOD PRESSURE: 88 MMHG | HEART RATE: 114 BPM

## 2023-02-22 DIAGNOSIS — E80.6 HYPERBILIRUBINEMIA: ICD-10-CM

## 2023-02-22 DIAGNOSIS — M19.011 ARTHRITIS OF RIGHT ACROMIOCLAVICULAR JOINT: ICD-10-CM

## 2023-02-22 DIAGNOSIS — M06.09 SERONEGATIVE RHEUMATOID ARTHRITIS OF MULTIPLE SITES (HCC): Primary | ICD-10-CM

## 2023-02-22 DIAGNOSIS — E78.2 MIXED HYPERLIPIDEMIA: ICD-10-CM

## 2023-02-22 DIAGNOSIS — Z79.60 LONG-TERM USE OF IMMUNOSUPPRESSANT MEDICATION: ICD-10-CM

## 2023-02-22 DIAGNOSIS — L21.9 SEBORRHEA OF FACE: ICD-10-CM

## 2023-02-22 PROCEDURE — 3078F DIAST BP <80 MM HG: CPT | Performed by: INTERNAL MEDICINE

## 2023-02-22 PROCEDURE — 3074F SYST BP LT 130 MM HG: CPT | Performed by: INTERNAL MEDICINE

## 2023-02-22 PROCEDURE — 99215 OFFICE O/P EST HI 40 MIN: CPT | Performed by: INTERNAL MEDICINE

## 2023-02-22 RX ORDER — METRONIDAZOLE 7.5 MG/G
CREAM TOPICAL 2 TIMES DAILY
Qty: 45 G | Refills: 1 | Status: SHIPPED | OUTPATIENT
Start: 2023-02-22

## 2023-02-22 NOTE — PATIENT INSTRUCTIONS
1) Continue to take one pill of Rinvoq daily. 2) Talk to Dr. Ashley Spivey about cholesterol management options. 3) You can take 1000mg of Tylenol up to twice daily. You can also take an occasional Aleve for pain, but try to limit this. 4) Try Voltaren (Diclofenac) gel on your shoulder for some quick pain relief. 5) I am referring you to a dermatologist. Call the number on the printout to schedule. 6) I am prescribing Flagyl cream to use on your face. 7) Check labs in the next 2-4 weeks. 8) Follow up in 3 months. Let me know if you have any questions or concerns in the meantime.

## 2023-02-22 NOTE — PROGRESS NOTES
REASON FOR VISIT:    is a 40 y.o. male with history of eczema who is returning for in-office Rheumatology followup of seronegative rheumatoid arthritis. Inflammatory arthritis phenotype includes:  Anti-CCP positive: no  Rheumatoid factor positive: no  Erosive disease: no  Extra-articular manifestations include: none  Relevant comorbidities include: Eczema     Immunosuppression Screening (7/22/2021): Quantiferon TB: negative  PPD:  Not performed  Hepatitis B: negative  Hepatitis C: negative     Therapy History includes:  Current DMARD therapy include: Rinvoq (9/21-present)  Prior DMARD therapy include: methotrexate 20 mg every Thursday (7/01/2021 to 9/08/2021), Bhumi Just 11 mg XR daily (9/08/2021; SAMPLE, nonpreferred by insurance)  Discontinued DMARDs because of inefficacy: None   Discontinued DMARDs because of side effects: methotrexate (short-term memory loss, intermittent dizziness and nausea)    HISTORY OF PRESENT ILLNESS    Pt returns for a follow up. Last visit 10/21/2022. He says that he is doing \"great\" today. Pt still takes on pill of Rinvoq daily for joint pain. He says that he does not have any joint pain besides for R shoulder pain when he lays on it. When he has pain he takes 2 Aleve and Tylenol. He recalls that he took 2 Aleve two times over the past week. Pt reports that he was unsuccessful in getting his cholesterol down through diet. He     Pt does not have any back pain. Pt denies new skin spots besides for facial eczema. He notes that he has had this since the beginning of the winter. Pt denies infections since last visit. REVIEW OF SYSTEMS  A comprehensive review of systems was negative except for that written in the HPI. A 10-point review of systems is per the new patient questionnaire, which has been reviewed extensively and scanned into the electronic medical record for future reference.   Review of systems is as above and is otherwise negative. ALLERGIES  Patient has no known allergies. MEDICATIONS  Current Outpatient Medications   Medication Sig    cyclobenzaprine (FLEXERIL) 10 mg tablet TAKE 1 TABLET BY MOUTH THREE TIMES A DAY AS NEEDED FOR MUSCLE SPASMS    upadacitinib (Rinvoq) 15 mg Tb24 Take 15 mg by mouth daily. Indications: rheumatoid arthritis    citalopram (CELEXA) 40 mg tablet Take 1 Tablet by mouth daily. ARIPiprazole (ABILIFY) 15 mg tablet Take 1 Tablet by mouth nightly. ondansetron (ZOFRAN ODT) 4 mg disintegrating tablet Take 1 Tablet by mouth every eight (8) hours as needed for Nausea or Vomiting. buPROPion XL (WELLBUTRIN XL) 300 mg XL tablet TAKE 1 TABLET BY MOUTH EVERY DAY IN THE MORNING    losartan-hydroCHLOROthiazide (HYZAAR) 100-12.5 mg per tablet Take 1 Tablet by mouth daily. clonazePAM (KlonoPIN) 0.5 mg tablet Take 1 Tablet by mouth two (2) times daily as needed for Anxiety. Max Daily Amount: 1 mg. buPROPion XL (WELLBUTRIN XL) 150 mg tablet Take 1 Tablet by mouth every morning. acetaminophen (TYLENOL) 500 mg tablet Take 500 mg by mouth every six (6) hours as needed for Pain.    triamcinolone acetonide (KENALOG) 0.1 % topical cream Apply  to affected area two (2) times a day. use thin layer     No current facility-administered medications for this visit. PAST MEDICAL HISTORY  Past Medical History:   Diagnosis Date    Anxiety disorder     Arthritis     back    Depression     Eczema     HTN (hypertension)     Obesity, Class II, BMI 35-39.9     Rheumatoid arthritis ([address]     Tendonitis     left wrist       FAMILY HISTORY  family history includes Alcohol abuse in his mother; Depression in his mother; Diabetes in his father; Heart Disease in his father; Hypertension in his father; Psychiatric Disorder in his mother. SOCIAL HISTORY  He  reports that he quit smoking about 5 years ago. His smoking use included cigarettes.  He has never used smokeless tobacco. He reports current alcohol use of about 2.0 - 3.0 standard drinks per week. He reports current drug use. Drug: Marijuana. Social History     Social History Narrative    Not on file   Plays guitar, piano, and bass. 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 There is no height or weight on file to calculate BMI. No flowsheet data found. General:  The patient is well developed, well nourished, alert, and in no apparent distress. Eyes: Sclera are anicteric. No conjunctival injection. HEENT:  Oropharynx is clear. No oral ulcers. Adequate salivary pooling. No cervical or supraclavicular lymphadenopathy. Lungs:  Clear to auscultation bilaterally, without wheeze or stridor. Normal respiratory effort. Cor:  Regular rate and rhythm. No murmur rub or gallop. Abdomen: Soft, non-tender, without hepatomegaly or masses. Extremities: No calf tenderness or edema. Warm and well perfused. Skin:  No significant abnormalities. Neuro: Nonfocal  Musculoskeletal:    A comprehensive musculoskeletal exam was performed for all joints of each upper and lower extremity and assessed for swelling, tenderness and range of motion. Results are documented as below:  No evidence of synovitis in the small joints of the hands, wrists, shoulders, elbows, hips, knees or ankles. __  General:  The patient is well developed, well nourished, alert, and in no apparent distress. Eyes: Sclera are anicteric. No conjunctival injection. HEENT:  Oropharynx is clear. No oral ulcers. Adequate salivary pooling. No cervical or supraclavicular lymphadenopathy. Lungs:  Clear to auscultation bilaterally, without wheeze or stridor. Normal respiratory effort. Cor:  Regular rate and rhythm. No murmur rub or gallop. Abdomen: Soft, non-tender, without hepatomegaly or masses. Extremities: No calf tenderness or edema. Warm and well perfused.    Skin:  Eczematous erythema in mid chest and mid back and more sparse upper arm involvement. Neuro: Normal unassisted gait. Musculoskeletal:    A comprehensive musculoskeletal exam was performed for all joints of each upper and lower extremity and assessed for swelling, tenderness and range of motion. Results are documented as below:  Tenderness in right 4th MCP, right thumb MCP, right lateral ankle. No overt synovitis. Tenderness left of midline in mid-thoracic spine, no palpable bony stepoff. No evidence of synovitis in the small joints of the hands, wrists, shoulders, elbows, hips, knees or ankles. Labs:  23: Cholesterol 283, , ESR 3, Cr 1.43, Tbili 1.8, Alk phos 54, AST 17, ALT 24, CBC WNL, CRP 2 mg/L  10/19/22: Cholesterol 286, , ESR 2, Cr 1.07, Tbili 1.3, Alk phos 59, AST 19, ALT 18, CBC WNL, CRP 2 mg/L  22: ESR 2, Cr 1.03, LFT WNL, WBC 7.6, HGB 16.3, Plt 283, CRP 7 mg/L  21 WBC 6.2, Hgb 15.8, Plt 326; ESR 19, Cr 1.05, LFTs WNL, CRP 6mg/L, QFN gold negative  21: Hep C Ab neg; C3 WNL, C4 high, ALESSANDRA IFA, RF <10, Lyme IRAJ neg    Imagin23 XR SPINE THORAC 2 V: Normal thoracic spine. 23 XR FOOT LT MIN 3 V, XR FOOT RT MIN 3 V:   Normal foot views. No fracture or evidence of inflammatory arthritis. 23 XR HAND LT MIN 3 V, XR HAND RT MIN 3 V:   Within normal limits. No fracture or evidence of inflammatory arthritis. 21 XR R hand: No erosions, ossicle adjacent to ulnar styloid    13 MR Lspine without: Minimal degenerative changes. There are no spinal canal or foraminal stenoses. Julia Riddhi  Mr. Jerica Cancino is a 40 y.o. male who returns for followup of seronegative rheumatoid arthritis, with improving arthritis back on more consistent Rinvoq. He has low-moderate disease activity but is content with his current control; obtaining updated xrays to ensure no e/o interval radiographic progression.  LDL is above goal of 100 for RA background and is most likely being driven up as well by his Rinvoq; he intends to focus on diet and approach is PCP for treatment options if he is unable to bring it down sufficiently. 1. Mid back pain  - XR SPINE THORAC 2 V; Future    2. Seronegative rheumatoid arthritis of multiple sites (HCC)  - C REACTIVE PROTEIN, QT; Future  - CBC WITH AUTOMATED DIFF; Future  - METABOLIC PANEL, COMPREHENSIVE; Future  - SED RATE (ESR); Future  - XR SPINE THORAC 2 V; Future  - XR HAND RT MIN 3 V; Future  - XR HAND LT MIN 3 V; Future  - XR FOOT LT MIN 3 V; Future  - XR FOOT RT MIN 3 V; Future    3. Mixed hyperlipidemia  - LIPID PANEL; Future    4. Long-term use of immunosuppressant medication  - XR HAND RT MIN 3 V; Future  - XR HAND LT MIN 3 V; Future  - XR FOOT LT MIN 3 V; Future     There are no Patient Instructions on file for this visit. No orders of the defined types were placed in this encounter. Medications: I am having Nacho Mail. Yusuf Omer \"Marcus\" maintain his triamcinolone acetonide, acetaminophen, buPROPion XL, clonazePAM, losartan-hydroCHLOROthiazide, ondansetron, buPROPion XL, ARIPiprazole, citalopram, Rinvoq, and cyclobenzaprine. Follow up: No follow-ups on file.     Face to face time: minutes  Note preparation and records review day of service: minutes  Total provider time day of service: minutes

## 2023-02-22 NOTE — PROGRESS NOTES
Chief Complaint   Patient presents with    Joint Pain     1. Have you been to the ER, urgent care clinic since your last visit? Hospitalized since your last visit? No    2. Have you seen or consulted any other health care providers outside of the [address] [place] [address] since your last visit? Include any pap smears or colon screening.  No

## 2023-03-09 RX ORDER — BUPROPION HYDROCHLORIDE 150 MG/1
TABLET ORAL
Qty: 90 TABLET | Refills: 4 | Status: SHIPPED | OUTPATIENT
Start: 2023-03-09

## 2023-03-10 DIAGNOSIS — M06.09 SERONEGATIVE RHEUMATOID ARTHRITIS OF MULTIPLE SITES (HCC): ICD-10-CM

## 2023-03-10 DIAGNOSIS — Z79.60 LONG-TERM USE OF IMMUNOSUPPRESSANT MEDICATION: ICD-10-CM

## 2023-03-10 NOTE — TELEPHONE ENCOUNTER
Last office visit 2/22/23  Next office visit 5/24/23  Lab Results   Component Value Date/Time    WBC 7.3 02/04/2023 07:48 AM    HGB 16.1 02/04/2023 07:48 AM    HCT 46.5 02/04/2023 07:48 AM    PLATELET 244 39/98/4109 07:48 AM    MCV 91 02/04/2023 07:48 AM     Lab Results   Component Value Date/Time    Sodium 139 02/04/2023 07:48 AM    Potassium 4.2 02/04/2023 07:48 AM    Chloride 98 02/04/2023 07:48 AM    CO2 23 02/04/2023 07:48 AM    Anion gap 4 (L) 01/06/2021 09:21 AM    Glucose 112 (H) 02/04/2023 07:48 AM    BUN 19 02/04/2023 07:48 AM    Creatinine 1.43 (H) 02/04/2023 07:48 AM    BUN/Creatinine ratio 13 02/04/2023 07:48 AM    GFR est  07/22/2021 04:41 PM    GFR est non-AA 89 07/22/2021 04:41 PM    Calcium 9.8 02/04/2023 07:48 AM    Bilirubin, total 1.8 (H) 02/04/2023 07:48 AM    Alk.  phosphatase 54 02/04/2023 07:48 AM    Protein, total 7.7 02/04/2023 07:48 AM    Albumin 5.3 (H) 02/04/2023 07:48 AM    A-G Ratio 2.2 02/04/2023 07:48 AM    ALT (SGPT) 24 02/04/2023 07:48 AM    AST (SGOT) 17 02/04/2023 07:48 AM

## 2023-03-13 RX ORDER — UPADACITINIB 15 MG/1
15 TABLET, EXTENDED RELEASE ORAL DAILY
Qty: 90 EACH | Refills: 1 | Status: ACTIVE | OUTPATIENT
Start: 2023-03-13

## 2023-03-13 NOTE — PROGRESS NOTES
[facility] Update    Date: 03/13/23    Rinvoq was routed to the mandated specialty pharmacy [facility]. Prior authorization has been approved through 2-2-2024. Pharmacy will inform patient and provide them with dispensing pharmacy's phone number. Please call us with any questions at  [phone].

## 2023-06-02 DIAGNOSIS — M54.42 LUMBAGO WITH SCIATICA, LEFT SIDE: ICD-10-CM

## 2023-06-06 RX ORDER — CYCLOBENZAPRINE HCL 10 MG
TABLET ORAL
Qty: 60 TABLET | Refills: 2 | Status: SHIPPED | OUTPATIENT
Start: 2023-06-06

## 2023-07-10 ENCOUNTER — OFFICE VISIT (OUTPATIENT)
Age: 43
End: 2023-07-10
Payer: COMMERCIAL

## 2023-07-10 DIAGNOSIS — F41.0 PANIC ATTACKS: ICD-10-CM

## 2023-07-10 DIAGNOSIS — R41.840 INATTENTION: ICD-10-CM

## 2023-07-10 DIAGNOSIS — F84.0 AUTISTIC BEHAVIOR: ICD-10-CM

## 2023-07-10 DIAGNOSIS — F41.9 ANXIETY AND DEPRESSION: Primary | ICD-10-CM

## 2023-07-10 DIAGNOSIS — F12.90 USES MARIJUANA: ICD-10-CM

## 2023-07-10 DIAGNOSIS — F40.10 SOCIAL ANXIETY DISORDER: ICD-10-CM

## 2023-07-10 DIAGNOSIS — F32.A ANXIETY AND DEPRESSION: Primary | ICD-10-CM

## 2023-07-10 DIAGNOSIS — Z81.8 FAMILY HISTORY OF PSYCHIATRIC DISORDER: ICD-10-CM

## 2023-07-10 PROCEDURE — 90791 PSYCH DIAGNOSTIC EVALUATION: CPT | Performed by: CLINICAL NEUROPSYCHOLOGIST

## 2023-07-10 NOTE — PROGRESS NOTES
[address] [facility]  [address] [address] [facility], [address] [place] [address]   [phone] Office   [phone] Fax      Neuropsychology    Initial Diagnostic Interview Note      Referral:  Cindia Jeans, DO    Amie Diaz is a 40 y.o. right handed   male who was accompanied by his two young sonst o the initial clinical interview on 7/10/23. Please refer to his medical records for details pertaining to his history. At the start of the appointment, I reviewed the patient's [facility] Epic Chart (including Media scanned in from previous providers) for the active Problem List, all pertinent Past Medical Hx, medications, recent radiologic and laboratory findings. In addition, I reviewed pt's documented Immunization Record and Encounter History. 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  Technical school completed after high school without history of previously diagnosed LD and/or receipt of special education services. Worked in [employer] for [employer] then was working from home and then after covid went back to work and noted cognitive concern and mood concerns as well as chronic ADD (diagnosed at age 10) and anxiety and depression concerns. Has been in therapy. Panic attacks come and go. Generalized difficulties with focus and attention and concentration. Easily distracted Starts tasks and does not complete. Was diagnosed with panic when pandemic occurred. Had to take six weeks off. The patient  has a past medical history of Anxiety disorder, Arthritis, Depression, Eczema, HTN (hypertension), Obesity, Class II, BMI 35-39.9, Rheumatoid arthritis ([address]), and Tendonitis. He  has a past surgical history that includes hernia repair (01/06/2021); Knee arthroscopy (Left); Upper gastrointestinal endoscopy; and orthopedic surgery (Left). 450 wellbutrin. Abilify 15mg, losartin, pain med prn for back issues.   Not on celexa

## 2023-07-28 ENCOUNTER — PROCEDURE VISIT (OUTPATIENT)
Age: 43
End: 2023-07-28
Payer: COMMERCIAL

## 2023-07-28 DIAGNOSIS — F41.0 PANIC ATTACKS: ICD-10-CM

## 2023-07-28 DIAGNOSIS — F90.0 ATTENTION DEFICIT HYPERACTIVITY DISORDER (ADHD), INATTENTIVE TYPE, MILD: ICD-10-CM

## 2023-07-28 DIAGNOSIS — F40.10 SOCIAL ANXIETY DISORDER: ICD-10-CM

## 2023-07-28 DIAGNOSIS — F41.9 MODERATE ANXIETY: ICD-10-CM

## 2023-07-28 DIAGNOSIS — F32.2 SEVERE MAJOR DEPRESSION (HCC): ICD-10-CM

## 2023-07-28 DIAGNOSIS — F43.10 PTSD (POST-TRAUMATIC STRESS DISORDER): Primary | ICD-10-CM

## 2023-07-28 PROCEDURE — 96131 PSYCL TST EVAL PHYS/QHP EA: CPT | Performed by: CLINICAL NEUROPSYCHOLOGIST

## 2023-07-28 PROCEDURE — 96137 PSYCL/NRPSYC TST PHY/QHP EA: CPT | Performed by: CLINICAL NEUROPSYCHOLOGIST

## 2023-07-28 PROCEDURE — 96136 PSYCL/NRPSYC TST PHY/QHP 1ST: CPT | Performed by: CLINICAL NEUROPSYCHOLOGIST

## 2023-07-28 PROCEDURE — 96138 PSYCL/NRPSYC TECH 1ST: CPT | Performed by: CLINICAL NEUROPSYCHOLOGIST

## 2023-07-28 PROCEDURE — 96130 PSYCL TST EVAL PHYS/QHP 1ST: CPT | Performed by: CLINICAL NEUROPSYCHOLOGIST

## 2023-07-28 PROCEDURE — 96139 PSYCL/NRPSYC TST TECH EA: CPT | Performed by: CLINICAL NEUROPSYCHOLOGIST

## 2023-08-25 DIAGNOSIS — M54.42 LUMBAGO WITH SCIATICA, LEFT SIDE: ICD-10-CM

## 2023-08-25 RX ORDER — CYCLOBENZAPRINE HCL 10 MG
TABLET ORAL
Qty: 60 TABLET | Refills: 0 | Status: SHIPPED | OUTPATIENT
Start: 2023-08-25

## 2023-09-19 ENCOUNTER — TELEMEDICINE (OUTPATIENT)
Age: 43
End: 2023-09-19
Payer: COMMERCIAL

## 2023-09-19 ENCOUNTER — TELEPHONE (OUTPATIENT)
Age: 43
End: 2023-09-19

## 2023-09-19 DIAGNOSIS — F33.9 RECURRENT DEPRESSION (HCC): ICD-10-CM

## 2023-09-19 DIAGNOSIS — R23.2 HOT FLASHES: ICD-10-CM

## 2023-09-19 DIAGNOSIS — E78.00 ELEVATED LDL CHOLESTEROL LEVEL: Primary | ICD-10-CM

## 2023-09-19 PROCEDURE — 99214 OFFICE O/P EST MOD 30 MIN: CPT | Performed by: FAMILY MEDICINE

## 2023-09-19 NOTE — PROGRESS NOTES
Progress Note    he is a 40y.o. year old male who presents for evaluation. Subjective: Woke up last Sunday top get ready and had a hot flash and started sweating profusely and got sick to his stomach. No emesis but was heaving. Lasted 10 minutes or so and just resolved. Happened Tuesday and again on Wednesday when walking to his daughter's bus stop also happened this past Saturday but not as intense. States after an episode he feels completely drained of energy. He stopped taking abilify and wellbutrin for 3 weeks. He did restart the wellbuitrin but does not feel he needs the abilify and would like to trial off of it. He is also requesting to have his cholesterol rechecked. He had previously been advised to start a statin for his cholesterol LDL greater than 200. We discussed this is a direct indication and if his LDL cholesterol remains over 011 we will certainly recommend starting a cholesterol medication such as rosuvastatin nightly. Reviewed PmHx, RxHx, FmHx, SocHx, AllgHx and updated and dated in the chart. Review of Systems - negative except as listed above in the HPI    Objective: There were no vitals filed for this visit. Current Outpatient Medications   Medication Sig    cyclobenzaprine (FLEXERIL) 10 MG tablet TAKE 1 TABLET BY MOUTH THREE TIMES A DAY AS NEEDED FOR MUSCLE SPASM    acetaminophen (TYLENOL) 500 MG tablet Take 1 tablet by mouth every 6 hours as needed    buPROPion (WELLBUTRIN XL) 150 MG extended release tablet Take 1 tablet by mouth every morning    buPROPion (WELLBUTRIN XL) 300 MG extended release tablet Take 1 tablet by mouth every morning    citalopram (CELEXA) 40 MG tablet Take 1 tablet by mouth daily    clonazePAM (KLONOPIN) 0.5 MG tablet Take 1 tablet by mouth 2 times daily as needed.  Max Daily Amount: 1 mg    losartan-hydroCHLOROthiazide (HYZAAR) 100-12.5 MG per tablet Take 1 tablet by mouth daily    metroNIDAZOLE (METROCREAM) 0.75 % cream Apply

## 2023-09-21 LAB
ALBUMIN SERPL-MCNC: 4.8 G/DL (ref 4.1–5.1)
ALBUMIN/GLOB SERPL: 2.1 {RATIO} (ref 1.2–2.2)
ALP SERPL-CCNC: 52 IU/L (ref 44–121)
ALT SERPL-CCNC: 27 IU/L (ref 0–44)
AST SERPL-CCNC: 16 IU/L (ref 0–40)
BILIRUB SERPL-MCNC: 1.4 MG/DL (ref 0–1.2)
BUN SERPL-MCNC: 16 MG/DL (ref 6–24)
BUN/CREAT SERPL: 15 (ref 9–20)
CALCIUM SERPL-MCNC: 9.4 MG/DL (ref 8.7–10.2)
CHLORIDE SERPL-SCNC: 95 MMOL/L (ref 96–106)
CHOLEST SERPL-MCNC: 207 MG/DL (ref 100–199)
CO2 SERPL-SCNC: 22 MMOL/L (ref 20–29)
CREAT SERPL-MCNC: 1.1 MG/DL (ref 0.76–1.27)
EGFRCR SERPLBLD CKD-EPI 2021: 86 ML/MIN/1.73
ERYTHROCYTE [DISTWIDTH] IN BLOOD BY AUTOMATED COUNT: 12 % (ref 11.6–15.4)
GLOBULIN SER CALC-MCNC: 2.3 G/DL (ref 1.5–4.5)
GLUCOSE SERPL-MCNC: 114 MG/DL (ref 70–99)
HCT VFR BLD AUTO: 45.5 % (ref 37.5–51)
HDLC SERPL-MCNC: 53 MG/DL
HGB BLD-MCNC: 15.8 G/DL (ref 13–17.7)
IMP & REVIEW OF LAB RESULTS: NORMAL
LDLC SERPL CALC-MCNC: 137 MG/DL (ref 0–99)
MCH RBC QN AUTO: 30.9 PG (ref 26.6–33)
MCHC RBC AUTO-ENTMCNC: 34.7 G/DL (ref 31.5–35.7)
MCV RBC AUTO: 89 FL (ref 79–97)
PLATELET # BLD AUTO: 293 X10E3/UL (ref 150–450)
POTASSIUM SERPL-SCNC: 3.6 MMOL/L (ref 3.5–5.2)
PROT SERPL-MCNC: 7.1 G/DL (ref 6–8.5)
RBC # BLD AUTO: 5.12 X10E6/UL (ref 4.14–5.8)
SODIUM SERPL-SCNC: 140 MMOL/L (ref 134–144)
SPECIMEN STATUS REPORT: NORMAL
T4 FREE SERPL-MCNC: 1.91 NG/DL (ref 0.82–1.77)
TRIGL SERPL-MCNC: 95 MG/DL (ref 0–149)
TSH SERPL DL<=0.005 MIU/L-ACNC: 0.9 UIU/ML (ref 0.45–4.5)
VLDLC SERPL CALC-MCNC: 17 MG/DL (ref 5–40)
WBC # BLD AUTO: 6.9 X10E3/UL (ref 3.4–10.8)

## 2023-09-22 LAB
EST. AVERAGE GLUCOSE BLD GHB EST-MCNC: 117 MG/DL
HBA1C MFR BLD: 5.7 % (ref 4.8–5.6)

## 2023-09-27 LAB
TESTOST FREE SERPL-MCNC: 9 PG/ML (ref 6.8–21.5)
TESTOST SERPL-MCNC: 173 NG/DL (ref 264–916)

## 2023-10-12 NOTE — PROGRESS NOTES
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 right handed   male who was accompanied by his two young sons to the initial clinical interview on 7/10/23. Please refer to his medical records for details pertaining to his history. At the start of the appointment, I reviewed the patient's [facility] Epic Chart (including Media scanned in from previous providers) for the active Problem List, all pertinent Past Medical Hx, medications, recent radiologic and laboratory findings. In addition, I reviewed pt's documented Immunization Record and Encounter History. 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 Worked in [employer] for [employer] then was working from home and then after covid went back to work and noted cognitive concern and mood concerns as well as chronic ADD (diagnosed at age 10) and anxiety and depression concerns. Has been in therapy. Panic attacks come and go. Generalized difficulties with focus and attention and concentration. Easily distracted Starts tasks and does not complete. Was diagnosed with panic when pandemic occurred. Had to take six weeks off. The patient  has a past medical history of Anxiety disorder, Arthritis, Depression, Eczema, HTN (hypertension), Obesity, Class II, BMI 35-39.9, Rheumatoid arthritis ([address]), and Tendonitis. He  has a past surgical history that includes hernia repair (01/06/2021); Knee arthroscopy (Left); Upper gastrointestinal endoscopy; and orthopedic surgery (Left). 450 wellbutrin. Abilify 15mg, losartin, pain med prn for back issues. Not on celexa anymore.       No

## 2023-10-24 ENCOUNTER — TELEMEDICINE (OUTPATIENT)
Age: 43
End: 2023-10-24
Payer: COMMERCIAL

## 2023-10-24 DIAGNOSIS — F90.0 ATTENTION DEFICIT HYPERACTIVITY DISORDER (ADHD), INATTENTIVE TYPE, MILD: ICD-10-CM

## 2023-10-24 DIAGNOSIS — F41.9 MODERATE ANXIETY: ICD-10-CM

## 2023-10-24 DIAGNOSIS — F84.0 AUTISM SPECTRUM DISORDER REQUIRING SUPPORT (LEVEL 1): ICD-10-CM

## 2023-10-24 DIAGNOSIS — F40.10 SOCIAL ANXIETY DISORDER: ICD-10-CM

## 2023-10-24 DIAGNOSIS — F43.10 PTSD (POST-TRAUMATIC STRESS DISORDER): Primary | ICD-10-CM

## 2023-10-24 DIAGNOSIS — F41.0 PANIC ATTACKS: ICD-10-CM

## 2023-10-24 DIAGNOSIS — F32.2 SEVERE MAJOR DEPRESSION (HCC): ICD-10-CM

## 2023-10-24 PROCEDURE — 90832 PSYTX W PT 30 MINUTES: CPT | Performed by: CLINICAL NEUROPSYCHOLOGIST

## 2023-10-24 NOTE — PROGRESS NOTES
with respect to the results of the recent Neuropsychological Evaluation, including discussing individual tests as well as patient's areas of neurocognitive strength versus weakness. We discussed, in detail, the following:        From the actual neurocognitive profile, there is support for a diagnosis of inattentive ADHD. It is a mild to moderate problem. He is also showing general problems with learning and high-level cognitive organization. All of his IQ domain scores were average. Motor coordination is borderline his performances across all other neurocognitive needs assessed are normal. From an emotional standpoint, there is severe and complex PTSD with severe depression and moderate anxiety. There is evidence of a mild/level and autism spectrum concern. I see the ADHD-inattentive issue as chronic, organic, and mild. I am much more concerned about his mood than I am the ADHD-inattentive. In this regard, in addition to continue medical care, recommendations include psychiatric treatment for anxiety and depression along with active engagement in trauma-informed therapy. Consider EMDR. Consider [address]. Consider ketamine. Consider also an appropriate medication for attention if this not medically contraindicated, the caution is advised in selecting same, given the anxiety. Psychoeducation regarding autism spectrum related issues needs to be provided. Consider DBT techniques as well. Have baseline data on him. Follow up as noted. Clinical correlation is, of course, indicated. DIAGNOSES:  PTSD, Severe, Complex                          Depression, Severe                          Anxiety, Moderate                          ADHD, Inattentive, Mild                           Autism Level 1                          Borderline Personality Traits in Adult         Education was provided regarding my diagnostic impressions, and we discussed treatment plan/options.    I also answered

## 2023-10-25 DIAGNOSIS — M54.42 LUMBAGO WITH SCIATICA, LEFT SIDE: ICD-10-CM

## 2023-10-27 RX ORDER — CYCLOBENZAPRINE HCL 10 MG
TABLET ORAL
Qty: 60 TABLET | Refills: 0 | Status: SHIPPED | OUTPATIENT
Start: 2023-10-27

## 2023-12-11 DIAGNOSIS — M54.42 LUMBAGO WITH SCIATICA, LEFT SIDE: ICD-10-CM

## 2023-12-12 RX ORDER — CYCLOBENZAPRINE HCL 10 MG
TABLET ORAL
Qty: 60 TABLET | Refills: 0 | Status: SHIPPED | OUTPATIENT
Start: 2023-12-12

## 2023-12-15 ENCOUNTER — OFFICE VISIT (OUTPATIENT)
Age: 43
End: 2023-12-15
Payer: COMMERCIAL

## 2023-12-15 VITALS
HEART RATE: 90 BPM | SYSTOLIC BLOOD PRESSURE: 116 MMHG | RESPIRATION RATE: 18 BRPM | OXYGEN SATURATION: 96 % | TEMPERATURE: 98.4 F | WEIGHT: 234 LBS | HEIGHT: 66 IN | BODY MASS INDEX: 37.61 KG/M2 | DIASTOLIC BLOOD PRESSURE: 89 MMHG

## 2023-12-15 DIAGNOSIS — F84.0 AUTISM SPECTRUM DISORDER: Primary | ICD-10-CM

## 2023-12-15 DIAGNOSIS — Z12.5 PROSTATE CANCER SCREENING: ICD-10-CM

## 2023-12-15 DIAGNOSIS — N52.9 ERECTILE DYSFUNCTION, UNSPECIFIED ERECTILE DYSFUNCTION TYPE: ICD-10-CM

## 2023-12-15 DIAGNOSIS — F41.9 ANXIETY: ICD-10-CM

## 2023-12-15 DIAGNOSIS — E29.1 HYPOGONADISM MALE: ICD-10-CM

## 2023-12-15 DIAGNOSIS — R73.03 PREDIABETES: ICD-10-CM

## 2023-12-15 PROCEDURE — 3079F DIAST BP 80-89 MM HG: CPT | Performed by: FAMILY MEDICINE

## 2023-12-15 PROCEDURE — 3074F SYST BP LT 130 MM HG: CPT | Performed by: FAMILY MEDICINE

## 2023-12-15 PROCEDURE — 99214 OFFICE O/P EST MOD 30 MIN: CPT | Performed by: FAMILY MEDICINE

## 2023-12-15 RX ORDER — SILDENAFIL 100 MG/1
TABLET, FILM COATED ORAL
Qty: 30 TABLET | Refills: 1 | Status: SHIPPED | OUTPATIENT
Start: 2023-12-15

## 2023-12-15 RX ORDER — CLONAZEPAM 0.5 MG/1
0.5 TABLET ORAL 2 TIMES DAILY PRN
Qty: 60 TABLET | Refills: 1 | Status: SHIPPED | OUTPATIENT
Start: 2023-12-15 | End: 2024-02-13

## 2023-12-15 SDOH — ECONOMIC STABILITY: TRANSPORTATION INSECURITY
IN THE PAST 12 MONTHS, HAS LACK OF TRANSPORTATION KEPT YOU FROM MEETINGS, WORK, OR FROM GETTING THINGS NEEDED FOR DAILY LIVING?: NO

## 2023-12-15 SDOH — ECONOMIC STABILITY: HOUSING INSECURITY
IN THE LAST 12 MONTHS, WAS THERE A TIME WHEN YOU DID NOT HAVE A STEADY PLACE TO SLEEP OR SLEPT IN A SHELTER (INCLUDING NOW)?: NO

## 2023-12-15 SDOH — ECONOMIC STABILITY: FOOD INSECURITY: WITHIN THE PAST 12 MONTHS, THE FOOD YOU BOUGHT JUST DIDN'T LAST AND YOU DIDN'T HAVE MONEY TO GET MORE.: NEVER TRUE

## 2023-12-15 SDOH — ECONOMIC STABILITY: INCOME INSECURITY: HOW HARD IS IT FOR YOU TO PAY FOR THE VERY BASICS LIKE FOOD, HOUSING, MEDICAL CARE, AND HEATING?: NOT VERY HARD

## 2023-12-15 SDOH — ECONOMIC STABILITY: FOOD INSECURITY: WITHIN THE PAST 12 MONTHS, YOU WORRIED THAT YOUR FOOD WOULD RUN OUT BEFORE YOU GOT MONEY TO BUY MORE.: NEVER TRUE

## 2023-12-15 NOTE — PROGRESS NOTES
Chief Complaint   Patient presents with    Follow-up     Patient presents in office today for f/u and labs. No concerns. 1. \"Have you been to the ER, urgent care clinic since your last visit? Hospitalized since your last visit? \" No    2. \"Have you seen or consulted any other health care providers outside of the [address] [place] [address] since your last visit? \" No     3. For patients aged 43-73: Has the patient had a colonoscopy / FIT/ Cologuard? NA - based on age      If the patient is female:    4. For patients aged 43-66: Has the patient had a mammogram within the past 2 years? NA - based on age or sex      11. For patients aged 21-65: Has the patient had a pap smear?  NA - based on age or sex

## 2024-01-29 DIAGNOSIS — M54.42 LUMBAGO WITH SCIATICA, LEFT SIDE: ICD-10-CM

## 2024-01-30 RX ORDER — CYCLOBENZAPRINE HCL 10 MG
TABLET ORAL
Qty: 60 TABLET | Refills: 0 | Status: SHIPPED | OUTPATIENT
Start: 2024-01-30

## 2024-02-19 RX ORDER — BUPROPION HYDROCHLORIDE 300 MG/1
TABLET ORAL
Qty: 90 TABLET | Refills: 1 | Status: SHIPPED | OUTPATIENT
Start: 2024-02-19

## 2024-03-05 DIAGNOSIS — F41.9 ANXIETY: ICD-10-CM

## 2024-03-05 DIAGNOSIS — M54.42 LUMBAGO WITH SCIATICA, LEFT SIDE: ICD-10-CM

## 2024-03-06 RX ORDER — CITALOPRAM 40 MG/1
TABLET ORAL
Qty: 90 TABLET | Refills: 1 | Status: SHIPPED | OUTPATIENT
Start: 2024-03-06

## 2024-03-06 RX ORDER — CYCLOBENZAPRINE HCL 10 MG
TABLET ORAL
Qty: 60 TABLET | Refills: 5 | Status: SHIPPED | OUTPATIENT
Start: 2024-03-06

## 2024-03-06 RX ORDER — CLONAZEPAM 0.5 MG/1
TABLET ORAL
Qty: 60 TABLET | Refills: 0 | Status: SHIPPED | OUTPATIENT
Start: 2024-03-06 | End: 2024-06-04

## 2024-06-05 ENCOUNTER — TELEPHONE (OUTPATIENT)
Age: 44
End: 2024-06-05

## 2024-06-05 NOTE — TELEPHONE ENCOUNTER
We received a fax refill request for Monty Prabhakar.  Please escribe clonazePAM (KLONOPIN) 0.5 MG tablet  to their pharmacy.  The pharmacy is correct in the chart and they are requesting a 60 day supply.

## 2024-06-06 NOTE — TELEPHONE ENCOUNTER
Called and spoke with patient. Advised that apt is needed. Patient verbalized understanding. VV scheduled for 6/12/24

## 2024-06-12 ENCOUNTER — TELEMEDICINE (OUTPATIENT)
Age: 44
End: 2024-06-12
Payer: COMMERCIAL

## 2024-06-12 DIAGNOSIS — F41.9 ANXIETY: Primary | ICD-10-CM

## 2024-06-12 DIAGNOSIS — M06.09 RHEUMATOID ARTHRITIS WITHOUT RHEUMATOID FACTOR, MULTIPLE SITES (HCC): ICD-10-CM

## 2024-06-12 DIAGNOSIS — F60.3 BORDERLINE PERSONALITY DISORDER IN ADULT (HCC): ICD-10-CM

## 2024-06-12 DIAGNOSIS — E66.01 SEVERE OBESITY (BMI 35.0-39.9) WITH COMORBIDITY (HCC): ICD-10-CM

## 2024-06-12 DIAGNOSIS — F33.9 RECURRENT DEPRESSION (HCC): ICD-10-CM

## 2024-06-12 PROCEDURE — 99213 OFFICE O/P EST LOW 20 MIN: CPT | Performed by: FAMILY MEDICINE

## 2024-06-12 RX ORDER — CYCLOBENZAPRINE HCL 10 MG
TABLET ORAL
Qty: 60 TABLET | Refills: 5 | Status: SHIPPED | OUTPATIENT
Start: 2024-06-12

## 2024-06-12 RX ORDER — BUPROPION HYDROCHLORIDE 300 MG/1
TABLET ORAL
Qty: 90 TABLET | Refills: 1 | Status: SHIPPED | OUTPATIENT
Start: 2024-06-12

## 2024-06-12 RX ORDER — CLONAZEPAM 0.5 MG/1
TABLET ORAL
Qty: 30 TABLET | Refills: 0 | Status: SHIPPED | OUTPATIENT
Start: 2024-06-12 | End: 2025-05-29

## 2024-06-12 ASSESSMENT — PATIENT HEALTH QUESTIONNAIRE - PHQ9
SUM OF ALL RESPONSES TO PHQ QUESTIONS 1-9: 0
SUM OF ALL RESPONSES TO PHQ QUESTIONS 1-9: 0
SUM OF ALL RESPONSES TO PHQ9 QUESTIONS 1 & 2: 0
SUM OF ALL RESPONSES TO PHQ QUESTIONS 1-9: 0
1. LITTLE INTEREST OR PLEASURE IN DOING THINGS: NOT AT ALL
2. FEELING DOWN, DEPRESSED OR HOPELESS: NOT AT ALL
SUM OF ALL RESPONSES TO PHQ QUESTIONS 1-9: 0

## 2024-06-12 NOTE — PROGRESS NOTES
Monty Prabhakar (:  1980) is a 43 y.o. male, Established patient, here for evaluation of the following chief complaint(s):  No chief complaint on file.         Assessment & Plan  1. Medication refill.  A prescription refill for Klonopin and Wellbutrin has been issued.    Follow-up  The patient is scheduled for a face-to-face visit within the next 3 to 4 months.    Results    1. Anxiety  -     clonazePAM (KLONOPIN) 0.5 MG tablet; TAKE 1 TABLET BY MOUTH TWICE DAILY AS NEEDED FOR ANXIETY*MAX DAILY AMOUNT 1MG*, Disp-30 tablet, R-0Normal  2. Borderline personality disorder in adult (HCC)  3. Severe obesity (BMI 35.0-39.9) with comorbidity (HCC)  4. Rheumatoid arthritis without rheumatoid factor, multiple sites (HCC)  5. Recurrent depression (HCC)  6. Lumbago with sciatica, left side  -     cyclobenzaprine (FLEXERIL) 10 MG tablet; TAKE 1 TABLET BY MOUTH THREE TIMES A DAY AS NEEDED FOR MUSCLE SPASM *SEND IN VIAL*, Disp-60 tablet, R-5Normal    No follow-ups on file.       Subjective   History of Present Illness  The patient presents via virtual visit for a medication refill.    The patient requires a refill of her Klonopin prescription, which she uses approximately once or twice a week, and Wellbutrin 300 mg, the latter of which she has sufficient refills. She is under the care of Dr. Augustin and has requested refills for Flexeril. She experiences regular back spasms, which are managed with Flexeril. Without this medication, she experiences sleep disturbances.    Review of Systems       Objective   There were no vitals taken for this visit.  Physical Exam         We discussed the expected course, resolution and complications of the diagnosis(es) in detail.  Medication risks, benefits, costs, interactions, and alternatives were discussed as indicated.  I advised him to contact the office if his condition worsens, changes or fails to improve as anticipated. He expressed understanding with the diagnosis(es) and plan.

## 2024-07-08 ENCOUNTER — TELEPHONE (OUTPATIENT)
Age: 44
End: 2024-07-08

## 2024-07-08 DIAGNOSIS — F41.9 ANXIETY: ICD-10-CM

## 2024-07-08 RX ORDER — CLONAZEPAM 0.5 MG/1
TABLET ORAL
Qty: 30 TABLET | Refills: 0 | Status: SHIPPED | OUTPATIENT
Start: 2024-07-08 | End: 2024-10-06

## 2024-07-08 NOTE — TELEPHONE ENCOUNTER
Patient refill request for controlled substance: clonazepam 0.5 mg  Last visit: 6/12/24   reflects concurrent use of THC  Refill sent

## 2024-07-08 NOTE — TELEPHONE ENCOUNTER
Please call patient, he was recommended to schedule in-office visit between 9/12/24 and 10/12/24 per last visit with Dr. Wild.

## 2024-08-22 ENCOUNTER — PATIENT MESSAGE (OUTPATIENT)
Age: 44
End: 2024-08-22

## 2024-08-22 RX ORDER — LOSARTAN POTASSIUM AND HYDROCHLOROTHIAZIDE 12.5; 1 MG/1; MG/1
1 TABLET ORAL DAILY
Qty: 30 TABLET | Refills: 3 | Status: SHIPPED | OUTPATIENT
Start: 2024-08-22

## 2024-08-22 RX ORDER — BUPROPION HYDROCHLORIDE 150 MG/1
150 TABLET ORAL EVERY MORNING
Qty: 30 TABLET | Refills: 3 | Status: SHIPPED | OUTPATIENT
Start: 2024-08-22

## 2024-08-22 RX ORDER — BUPROPION HYDROCHLORIDE 300 MG/1
TABLET ORAL
Qty: 90 TABLET | Refills: 1 | Status: SHIPPED | OUTPATIENT
Start: 2024-08-22

## 2024-10-01 ENCOUNTER — OFFICE VISIT (OUTPATIENT)
Age: 44
End: 2024-10-01
Payer: COMMERCIAL

## 2024-10-01 VITALS
HEIGHT: 66 IN | OXYGEN SATURATION: 97 % | HEART RATE: 100 BPM | SYSTOLIC BLOOD PRESSURE: 132 MMHG | WEIGHT: 239 LBS | DIASTOLIC BLOOD PRESSURE: 89 MMHG | BODY MASS INDEX: 38.41 KG/M2 | RESPIRATION RATE: 18 BRPM | TEMPERATURE: 98.3 F

## 2024-10-01 DIAGNOSIS — I10 PRIMARY HYPERTENSION: Primary | ICD-10-CM

## 2024-10-01 DIAGNOSIS — F41.9 ANXIETY: ICD-10-CM

## 2024-10-01 DIAGNOSIS — I10 PRIMARY HYPERTENSION: ICD-10-CM

## 2024-10-01 PROCEDURE — 3079F DIAST BP 80-89 MM HG: CPT | Performed by: FAMILY MEDICINE

## 2024-10-01 PROCEDURE — 99214 OFFICE O/P EST MOD 30 MIN: CPT | Performed by: FAMILY MEDICINE

## 2024-10-01 PROCEDURE — 3075F SYST BP GE 130 - 139MM HG: CPT | Performed by: FAMILY MEDICINE

## 2024-10-01 RX ORDER — LOSARTAN POTASSIUM AND HYDROCHLOROTHIAZIDE 12.5; 1 MG/1; MG/1
1 TABLET ORAL DAILY
Qty: 90 TABLET | Refills: 3 | Status: SHIPPED | OUTPATIENT
Start: 2024-10-01

## 2024-10-01 RX ORDER — CYCLOBENZAPRINE HCL 10 MG
TABLET ORAL
Qty: 60 TABLET | Refills: 5 | Status: SHIPPED | OUTPATIENT
Start: 2024-10-01

## 2024-10-01 RX ORDER — CLONAZEPAM 0.5 MG/1
TABLET ORAL
Qty: 30 TABLET | Refills: 1 | Status: SHIPPED | OUTPATIENT
Start: 2024-10-01 | End: 2024-12-30

## 2024-10-01 SDOH — ECONOMIC STABILITY: FOOD INSECURITY: WITHIN THE PAST 12 MONTHS, THE FOOD YOU BOUGHT JUST DIDN'T LAST AND YOU DIDN'T HAVE MONEY TO GET MORE.: NEVER TRUE

## 2024-10-01 SDOH — ECONOMIC STABILITY: FOOD INSECURITY: WITHIN THE PAST 12 MONTHS, YOU WORRIED THAT YOUR FOOD WOULD RUN OUT BEFORE YOU GOT MONEY TO BUY MORE.: NEVER TRUE

## 2024-10-01 SDOH — ECONOMIC STABILITY: INCOME INSECURITY: HOW HARD IS IT FOR YOU TO PAY FOR THE VERY BASICS LIKE FOOD, HOUSING, MEDICAL CARE, AND HEATING?: NOT HARD AT ALL

## 2024-10-01 ASSESSMENT — PATIENT HEALTH QUESTIONNAIRE - PHQ9
SUM OF ALL RESPONSES TO PHQ QUESTIONS 1-9: 0
SUM OF ALL RESPONSES TO PHQ9 QUESTIONS 1 & 2: 0
SUM OF ALL RESPONSES TO PHQ QUESTIONS 1-9: 0
SUM OF ALL RESPONSES TO PHQ QUESTIONS 1-9: 0
1. LITTLE INTEREST OR PLEASURE IN DOING THINGS: NOT AT ALL
SUM OF ALL RESPONSES TO PHQ QUESTIONS 1-9: 0
2. FEELING DOWN, DEPRESSED OR HOPELESS: NOT AT ALL

## 2024-10-01 NOTE — PROGRESS NOTES
Chief Complaint   Patient presents with    Hypertension     labs    Medication Refill     \"Have you been to the ER, urgent care clinic since your last visit?  Hospitalized since your last visit?\"    NO    “Have you seen or consulted any other health care providers outside our system since your last visit?”    NO           
temperature 98.3 °F (36.8 °C), resp. rate 18, height 1.676 m (5' 6\"), weight 108.4 kg (239 lb), SpO2 97%.  Physical Exam         Chief Complaint   Patient presents with    Hypertension     labs    Medication Refill     He is a 43 y.o. male who presents for evalution.     Reviewed PmHx, RxHx, FmHx, SocHx, AllgHx and updated and dated in the chart.    CURRENT MEDS W/ ASSOC DIAG           Start Date End Date     acetaminophen (TYLENOL) 500 MG tablet  --  --     Associated Diagnoses:  --     buPROPion (WELLBUTRIN XL) 150 MG extended release tablet  08/22/24  --     Take 1 tablet by mouth every morning     Associated Diagnoses:  --     buPROPion (WELLBUTRIN XL) 300 MG extended release tablet  08/22/24  --     TAKE 1 TABLET BY MOUTH EACH MORNING ** SAFETY VIAL **     Associated Diagnoses:  --     citalopram (CELEXA) 40 MG tablet  03/06/24  --     TAKE 1 TABLET BY MOUTH ONCE DAILY ** SAFETY VIAL **     Associated Diagnoses:  --     clonazePAM (KLONOPIN) 0.5 MG tablet  10/01/24  12/30/24     TAKE 1 TABLET BY MOUTH TWICE DAILY AS NEEDED FOR ANXIETY*MAX DAILY AMOUNT 1MG*USE SPARINGLY     Associated Diagnoses:  Anxiety     cyclobenzaprine (FLEXERIL) 10 MG tablet  10/01/24  --     TAKE 1 TABLET BY MOUTH THREE TIMES A DAY AS NEEDED FOR MUSCLE SPASM *SEND IN VIAL*     Associated Diagnoses:  --     losartan-hydroCHLOROthiazide (HYZAAR) 100-12.5 MG per tablet  10/01/24  --     Take 1 tablet by mouth daily     Associated Diagnoses:  --     metroNIDAZOLE (METROCREAM) 0.75 % cream  02/22/23  --     Associated Diagnoses:  --     ondansetron (ZOFRAN-ODT) 4 MG disintegrating tablet  11/15/22  --     Associated Diagnoses:  --     sildenafil (VIAGRA) 100 MG tablet  12/15/23  --     1/4 up to 1 tab daily as needed for ED     Associated Diagnoses:  Erectile dysfunction, unspecified erectile dysfunction type     triamcinolone (KENALOG) 0.1 % cream  11/24/20  --     Associated Diagnoses:  --             Patient Active Problem List   Diagnosis

## 2024-10-02 LAB
ALBUMIN SERPL-MCNC: 4.5 G/DL (ref 4.1–5.1)
ALP SERPL-CCNC: 61 IU/L (ref 44–121)
ALT SERPL-CCNC: 16 IU/L (ref 0–44)
AST SERPL-CCNC: 19 IU/L (ref 0–40)
BASOPHILS # BLD AUTO: 0 X10E3/UL (ref 0–0.2)
BASOPHILS NFR BLD AUTO: 1 %
BILIRUB SERPL-MCNC: 0.5 MG/DL (ref 0–1.2)
BUN SERPL-MCNC: 12 MG/DL (ref 6–24)
BUN/CREAT SERPL: 14 (ref 9–20)
CALCIUM SERPL-MCNC: 9.6 MG/DL (ref 8.7–10.2)
CHLORIDE SERPL-SCNC: 101 MMOL/L (ref 96–106)
CHOLEST SERPL-MCNC: 268 MG/DL (ref 100–199)
CO2 SERPL-SCNC: 25 MMOL/L (ref 20–29)
CREAT SERPL-MCNC: 0.88 MG/DL (ref 0.76–1.27)
EGFRCR SERPLBLD CKD-EPI 2021: 110 ML/MIN/1.73
EOSINOPHIL # BLD AUTO: 0.1 X10E3/UL (ref 0–0.4)
EOSINOPHIL NFR BLD AUTO: 1 %
ERYTHROCYTE [DISTWIDTH] IN BLOOD BY AUTOMATED COUNT: 12.2 % (ref 11.6–15.4)
GLOBULIN SER CALC-MCNC: 2.5 G/DL (ref 1.5–4.5)
GLUCOSE SERPL-MCNC: 89 MG/DL (ref 70–99)
HBA1C MFR BLD: 5.6 % (ref 4.8–5.6)
HCT VFR BLD AUTO: 43.7 % (ref 37.5–51)
HDLC SERPL-MCNC: 54 MG/DL
HGB BLD-MCNC: 14.5 G/DL (ref 13–17.7)
IMM GRANULOCYTES # BLD AUTO: 0.1 X10E3/UL (ref 0–0.1)
IMM GRANULOCYTES NFR BLD AUTO: 1 %
LDLC SERPL CALC-MCNC: 179 MG/DL (ref 0–99)
LYMPHOCYTES # BLD AUTO: 1.6 X10E3/UL (ref 0.7–3.1)
LYMPHOCYTES NFR BLD AUTO: 27 %
MCH RBC QN AUTO: 31.3 PG (ref 26.6–33)
MCHC RBC AUTO-ENTMCNC: 33.2 G/DL (ref 31.5–35.7)
MCV RBC AUTO: 94 FL (ref 79–97)
MONOCYTES # BLD AUTO: 0.4 X10E3/UL (ref 0.1–0.9)
MONOCYTES NFR BLD AUTO: 7 %
NEUTROPHILS # BLD AUTO: 3.9 X10E3/UL (ref 1.4–7)
NEUTROPHILS NFR BLD AUTO: 63 %
PLATELET # BLD AUTO: 264 X10E3/UL (ref 150–450)
POTASSIUM SERPL-SCNC: 4.3 MMOL/L (ref 3.5–5.2)
PROT SERPL-MCNC: 7 G/DL (ref 6–8.5)
PSA SERPL-MCNC: 0.8 NG/ML (ref 0–4)
RBC # BLD AUTO: 4.64 X10E6/UL (ref 4.14–5.8)
SODIUM SERPL-SCNC: 141 MMOL/L (ref 134–144)
TRIGL SERPL-MCNC: 189 MG/DL (ref 0–149)
TSH SERPL DL<=0.005 MIU/L-ACNC: 1.39 UIU/ML (ref 0.45–4.5)
VLDLC SERPL CALC-MCNC: 35 MG/DL (ref 5–40)
WBC # BLD AUTO: 6.1 X10E3/UL (ref 3.4–10.8)

## 2024-11-20 DIAGNOSIS — F41.9 ANXIETY: ICD-10-CM

## 2024-11-21 RX ORDER — CLONAZEPAM 0.5 MG/1
TABLET ORAL
Qty: 30 TABLET | Refills: 1 | OUTPATIENT
Start: 2024-11-21 | End: 2025-02-18

## 2024-11-21 RX ORDER — CYCLOBENZAPRINE HCL 10 MG
TABLET ORAL
Qty: 60 TABLET | Refills: 5 | OUTPATIENT
Start: 2024-11-21

## 2024-11-22 NOTE — TELEPHONE ENCOUNTER
Pt called in and states the clonazepam & cyclobenzaprine didn't get sent all the way to the pharmacy and is asking if you could please resend them in? Thanks.

## 2024-11-26 ENCOUNTER — TELEPHONE (OUTPATIENT)
Facility: CLINIC | Age: 44
End: 2024-11-26

## 2024-11-26 DIAGNOSIS — F41.9 ANXIETY: ICD-10-CM

## 2024-11-26 RX ORDER — CYCLOBENZAPRINE HCL 10 MG
TABLET ORAL
Qty: 60 TABLET | Refills: 5 | Status: SHIPPED | OUTPATIENT
Start: 2024-11-26

## 2024-11-26 RX ORDER — CLONAZEPAM 0.5 MG/1
TABLET ORAL
Qty: 30 TABLET | Refills: 1 | Status: SHIPPED | OUTPATIENT
Start: 2024-11-26 | End: 2025-02-23

## 2024-11-26 NOTE — TELEPHONE ENCOUNTER
Pt has been requesting this medication and it has not been received completely by the pharmacy stating the process has not been completed and it shows as pending for prescriptions:    clonazePAM (KLONOPIN) 0.5 MG tablet    cyclobenzaprine (FLEXERIL) 10 MG tablet    Pt is now completely out and is having trouble sleeping without his medications.  # [phone]  Thank You

## 2025-02-24 DIAGNOSIS — F41.9 ANXIETY: ICD-10-CM

## 2025-02-27 RX ORDER — CLONAZEPAM 0.5 MG/1
TABLET ORAL
Qty: 30 TABLET | Refills: 1 | Status: SHIPPED | OUTPATIENT
Start: 2025-02-27 | End: 2025-05-24

## 2025-04-28 RX ORDER — CYCLOBENZAPRINE HCL 10 MG
TABLET ORAL
Qty: 60 TABLET | Refills: 5 | Status: SHIPPED | OUTPATIENT
Start: 2025-04-28

## (undated) DEVICE — ELECTRO LUBE IS A SINGLE PATIENT USE DEVICE THAT IS INTENDED TO BE USED ON ELECTROSURGICAL ELECTRODES TO REDUCE STICKING.: Brand: KEY SURGICAL ELECTRO LUBE

## (undated) DEVICE — SUTURE PDS II SZ 0 L27IN ABSRB VLT L36MM CT-1 1/2 CIR Z340H

## (undated) DEVICE — BANDAGE ADH W0.75XL3IN NAT PLAS CURAD

## (undated) DEVICE — SUTURE SZ 0 27IN 5/8 CIR UR-6  TAPER PT VIOLET ABSRB VICRYL J603H

## (undated) DEVICE — TOWEL SURG W17XL27IN STD BLU COT NONFENESTRATED PREWASHED

## (undated) DEVICE — SUTURE MCRYL SZ 4-0 L27IN ABSRB UD L19MM PS-2 1/2 CIR PRIM Y426H

## (undated) DEVICE — BLADELESS OBTURATOR: Brand: WECK VISTA

## (undated) DEVICE — CLICKLINE SCISSORS INSERT: Brand: CLICKLINE

## (undated) DEVICE — REM POLYHESIVE ADULT PATIENT RETURN ELECTRODE: Brand: VALLEYLAB

## (undated) DEVICE — DRAPE,REIN 53X77,STERILE: Brand: MEDLINE

## (undated) DEVICE — BINDER ABD S/M 12X30-45IN --

## (undated) DEVICE — COVER LT HNDL PLAS RIG 1 PER PK

## (undated) DEVICE — INFECTION CONTROL KIT SYS

## (undated) DEVICE — COVER MPLR TIP CRV SCIS ACC DA VINCI

## (undated) DEVICE — VISUALIZATION SYSTEM: Brand: CLEARIFY

## (undated) DEVICE — NEEDLE HYPO 22GA L1.5IN BLK S STL HUB POLYPR SHLD REG BVL

## (undated) DEVICE — DEVICE TRNSF SPIK STL 2008S] MICROTEK MEDICAL INC]

## (undated) DEVICE — 3M™ IOBAN™ 2 ANTIMICROBIAL INCISE DRAPE 6650EZ: Brand: IOBAN™ 2

## (undated) DEVICE — SURGICAL PROCEDURE KIT GEN LAPAROSCOPY LF

## (undated) DEVICE — STRIP,CLOSURE,WOUND,MEDI-STRIP,1/2X4: Brand: MEDLINE

## (undated) DEVICE — SOL IRRIGATION INJ NACL 0.9% 500ML BTL

## (undated) DEVICE — LAPAROSCOPIC TROCAR SLEEVE/SINGLE USE: Brand: KII® OPTICAL ACCESS SYSTEM

## (undated) DEVICE — ARM DRAPE

## (undated) DEVICE — STERILE POLYISOPRENE POWDER-FREE SURGICAL GLOVES: Brand: PROTEXIS

## (undated) DEVICE — AIRSEAL BIFURCATED SMOKE EVAC FILTERED TUBE SET: Brand: AIRSEAL

## (undated) DEVICE — SUTURE ABSRB L30CM 2-0 VLT SPRL PDS + STRATAFIX SXPP1B410

## (undated) DEVICE — NDL SPNE LR LCK 18GX6IN PNK --

## (undated) DEVICE — PAD BD MATTRESS 73X32 IN STD CONVOLUTED FOAM LTX FREE

## (undated) DEVICE — SUTURE 1 STRATAFIX SYMMETRIC PDS + 30CM CT-1 SXPP1A435

## (undated) DEVICE — SEAL UNIV 5-8MM DISP BX/10 -- DA VINCI XI - SNGL USE

## (undated) DEVICE — PREP SKN CHLRAPRP APL 26ML STR --

## (undated) DEVICE — STRAP,POSITIONING,KNEE/BODY,FOAM,4X60": Brand: MEDLINE

## (undated) DEVICE — COVER,MAYO STAND,STERILE: Brand: MEDLINE